# Patient Record
Sex: MALE | Race: WHITE
[De-identification: names, ages, dates, MRNs, and addresses within clinical notes are randomized per-mention and may not be internally consistent; named-entity substitution may affect disease eponyms.]

---

## 2017-11-08 ENCOUNTER — HOSPITAL ENCOUNTER (OUTPATIENT)
Dept: HOSPITAL 47 - RADUSWWP | Age: 57
Discharge: HOME | End: 2017-11-08
Payer: COMMERCIAL

## 2017-11-08 ENCOUNTER — HOSPITAL ENCOUNTER (OUTPATIENT)
Dept: HOSPITAL 47 - LABWHC1 | Age: 57
Discharge: HOME | End: 2017-11-08
Payer: COMMERCIAL

## 2017-11-08 DIAGNOSIS — E89.1: ICD-10-CM

## 2017-11-08 DIAGNOSIS — E44.0: ICD-10-CM

## 2017-11-08 DIAGNOSIS — N19: ICD-10-CM

## 2017-11-08 DIAGNOSIS — K74.1: ICD-10-CM

## 2017-11-08 DIAGNOSIS — E55.9: ICD-10-CM

## 2017-11-08 DIAGNOSIS — E21.1: ICD-10-CM

## 2017-11-08 DIAGNOSIS — K90.89: ICD-10-CM

## 2017-11-08 DIAGNOSIS — E66.01: Primary | ICD-10-CM

## 2017-11-08 DIAGNOSIS — R16.1: ICD-10-CM

## 2017-11-08 DIAGNOSIS — K50.90: ICD-10-CM

## 2017-11-08 DIAGNOSIS — Z98.890: ICD-10-CM

## 2017-11-08 DIAGNOSIS — D50.8: ICD-10-CM

## 2017-11-08 DIAGNOSIS — R10.31: ICD-10-CM

## 2017-11-08 DIAGNOSIS — R93.5: Primary | ICD-10-CM

## 2017-11-08 LAB
ALP SERPL-CCNC: 66 U/L (ref 38–126)
ALT SERPL-CCNC: 26 U/L (ref 21–72)
ANION GAP SERPL CALC-SCNC: 13 MMOL/L
APTT BLD: 25.1 SEC (ref 22–30)
AST SERPL-CCNC: 12 U/L (ref 17–59)
BUN SERPL-SCNC: 17 MG/DL (ref 9–20)
CALCIUM SPEC-MCNC: 9.5 MG/DL (ref 8.4–10.2)
CH: 20.3
CHCM: 26.9
CHLORIDE SERPL-SCNC: 100 MMOL/L (ref 98–107)
CHOLEST SERPL-MCNC: 149 MG/DL (ref ?–200)
CO2 SERPL-SCNC: 26 MMOL/L (ref 22–30)
ERYTHROCYTE [DISTWIDTH] IN BLOOD BY AUTOMATED COUNT: 3.99 M/UL (ref 4.3–5.9)
ERYTHROCYTE [DISTWIDTH] IN BLOOD: 16 % (ref 11.5–15.5)
GLUCOSE SERPL-MCNC: 114 MG/DL (ref 74–99)
HCT VFR BLD AUTO: 30.2 % (ref 39–53)
HDLC SERPL-MCNC: 23 MG/DL (ref 40–60)
HDW: 3.33
HGB BLD-MCNC: 7.9 GM/DL (ref 13–17.5)
INR PPP: 1.1 (ref ?–1.2)
IRON SERPL-MCNC: 15 UG/DL (ref 65–175)
MAGNESIUM SPEC-SCNC: 2 MG/DL (ref 1.6–2.3)
MCH RBC QN AUTO: 19.8 PG (ref 25–35)
MCHC RBC AUTO-ENTMCNC: 26.2 G/DL (ref 31–37)
MCV RBC AUTO: 75.8 FL (ref 80–100)
NON-AFRICAN AMERICAN GFR(MDRD): >60
POTASSIUM SERPL-SCNC: 5 MMOL/L (ref 3.5–5.1)
PREALB SERPL-MCNC: 8 MG/DL (ref 18–42)
PROT SERPL-MCNC: 6.9 G/DL (ref 6.3–8.2)
PT BLD: 10.6 SEC (ref 9–12)
SODIUM SERPL-SCNC: 139 MMOL/L (ref 137–145)
TIBC SERPL-MCNC: 350 UG/DL (ref 228–460)
VIT B12 SERPL-MCNC: 347 PG/ML (ref 200–944)
WBC # BLD AUTO: 6.6 K/UL (ref 3.8–10.6)

## 2017-11-08 PROCEDURE — 82306 VITAMIN D 25 HYDROXY: CPT

## 2017-11-08 PROCEDURE — 82607 VITAMIN B-12: CPT

## 2017-11-08 PROCEDURE — 83735 ASSAY OF MAGNESIUM: CPT

## 2017-11-08 PROCEDURE — 83036 HEMOGLOBIN GLYCOSYLATED A1C: CPT

## 2017-11-08 PROCEDURE — 82525 ASSAY OF COPPER: CPT

## 2017-11-08 PROCEDURE — 84255 ASSAY OF SELENIUM: CPT

## 2017-11-08 PROCEDURE — 80061 LIPID PANEL: CPT

## 2017-11-08 PROCEDURE — 83540 ASSAY OF IRON: CPT

## 2017-11-08 PROCEDURE — 84425 ASSAY OF VITAMIN B-1: CPT

## 2017-11-08 PROCEDURE — 83970 ASSAY OF PARATHORMONE: CPT

## 2017-11-08 PROCEDURE — 84134 ASSAY OF PREALBUMIN: CPT

## 2017-11-08 PROCEDURE — 84630 ASSAY OF ZINC: CPT

## 2017-11-08 PROCEDURE — 82746 ASSAY OF FOLIC ACID SERUM: CPT

## 2017-11-08 PROCEDURE — 74000: CPT

## 2017-11-08 PROCEDURE — 83550 IRON BINDING TEST: CPT

## 2017-11-08 PROCEDURE — 84443 ASSAY THYROID STIM HORMONE: CPT

## 2017-11-08 PROCEDURE — 80053 COMPREHEN METABOLIC PANEL: CPT

## 2017-11-08 PROCEDURE — 85027 COMPLETE CBC AUTOMATED: CPT

## 2017-11-08 PROCEDURE — 76700 US EXAM ABDOM COMPLETE: CPT

## 2017-11-08 PROCEDURE — 85610 PROTHROMBIN TIME: CPT

## 2017-11-08 PROCEDURE — 85730 THROMBOPLASTIN TIME PARTIAL: CPT

## 2017-11-08 PROCEDURE — 84590 ASSAY OF VITAMIN A: CPT

## 2017-11-08 PROCEDURE — 84100 ASSAY OF PHOSPHORUS: CPT

## 2017-11-08 PROCEDURE — 36415 COLL VENOUS BLD VENIPUNCTURE: CPT

## 2017-11-08 PROCEDURE — 82728 ASSAY OF FERRITIN: CPT

## 2017-11-08 NOTE — XR
EXAMINATION TYPE: XR abdomen 1V

 

DATE OF EXAM: 11/8/2017 11:35 AM

 

CLINICAL HISTORY:  Recent gastric ulcer surgery. Partial gastrectomy.

 

TECHNIQUE: Single upright image of the abdomen is obtained.

 

COMPARISON: Abdominal ultrasound of the same date.

 

FINDINGS: Preferential paucity of bowel gas is seen within the left midabdomen and few air-fluid leve
ls may be within bowel or potential residual intra-abdominal abscess is seen on the abdominal ultraso
und of the same day. Scattered gas is seen in non-distended small bowel loops. Gas and fecal material
 is seen in non-distended colon. The lung bases are clear and the osseous structures are intact. No e
vidence of pneumoperitoneum. Mild degenerative changes of the femoral acetabular joints and mild levo
scoliotic curvature of the lumbar spine are seen.

 

IMPRESSION:

1. Preferential paucity of bowel gas in the left midabdomen with few air-fluid levels that may be loc
ated in small bowel or residual abscess. Surrounding postsurgical sutures are seen within the left up
per and left lower quadrants.

2. Nonobstructive bowel gas pattern.

3. No evidence of pneumoperitoneum.

## 2017-11-08 NOTE — P.EN
Received a phone call regarding US reports on 11/8/17 at 5:30 pm. I 

communicated the results with Dr. Phillips. She is aware of the Xray and US 

reports.

## 2017-11-08 NOTE — US
EXAMINATION TYPE: US abdomen complete

 

DATE OF EXAM: 11/8/2017

 

COMPARISON: CT abdomen and pelvis dated 8/30/2017

 

CLINICAL HISTORY: RLQ Pain R10.31. Abdominal pain, stomach surgery for ulcer 6 weeks ago

 

EXAM MEASUREMENTS:

 

Liver Length:  15.1 cm   

Gallbladder Wall:  0.3 cm   

CBD:  0.3 cm

Spleen:  13.8 cm   

Right Kidney:  11.1 x 5.3 x 5.0 cm 

Left Kidney:  10.8 x 4.7 x 4.4 cm   

 

**Technical limitations due to large amount of overlying bowel content 

 

Pancreas:  Obscured by bowel gas

Liver:  best visualized intercostally, appears wnl  

Gallbladder:  no evidence of stones

**Evidence for sonographic Salter's sign:  no

CBD:  visualized portion appears wnl 

Spleen:  upper limits of normal   

Right Kidney:  no evidence of hydronephrosis or mass   

Left Kidney:  no evidence of hydronephrosis or mass   

Upper IVC:  wnl  

Abd Aorta:  portions obscured by overlying bowel content 

 

Scanned within LUQ/LLQ (patient's area of pain), complex area visualized =  12.9cm

 

 

IMPRESSION: 

1. Complex 12.9 cm multiloculated fluid collection in the patient's stated area of pain in the left l
ower quadrant containing foci of air. Sonographic findings favor residual abscess and correlation wit
h CT could be performed for further anatomic delineation.

2. Prominent size of the spleen measuring 13.8 cm, approaching criteria for splenomegaly.

 

 

A Orange message has been communicated to Laurie Vega MD via the [employer] | Critical Re
sult system on 11/8/2017 12:10 PM, Message ID [id number].

## 2017-11-17 ENCOUNTER — HOSPITAL ENCOUNTER (OUTPATIENT)
Dept: HOSPITAL 47 - RADCTMAIN | Age: 57
End: 2017-11-17
Payer: COMMERCIAL

## 2017-11-17 DIAGNOSIS — R59.0: ICD-10-CM

## 2017-11-17 DIAGNOSIS — L02.211: Primary | ICD-10-CM

## 2017-11-17 PROCEDURE — 74177 CT ABD & PELVIS W/CONTRAST: CPT

## 2017-11-17 NOTE — CT
EXAMINATION TYPE: CT abdomen pelvis w con

 

DATE OF EXAM: 11/17/2017

 

COMPARISON: Abdominal ultrasound dated 11/8/2017 and CT abdomen pelvis dated 8/30/2017.

 

HISTORY: RUQ abdominal pain

 

CT DLP: 1593 mGycm

Automated exposure control for dose reduction was used.

 

TECHNIQUE:  Helical acquisition of images was performed from the lung bases through the pelvis.

 

CONTRAST: 

Performed with Oral Contrast and with IV Contrast, patient injected with 100 ml mL of Omnipaque 300.

 

FINDINGS: 

 

LUNG BASES: Right basilar pleural parenchymal scarring and left basilar subsegmental linear atelectas
is are present within the lung bases.

 

LIVER/GB: No significant abnormality is appreciated. No evidence of cholelithiasis.

 

PANCREAS: No significant abnormality is seen. No ductal dilatation.

 

SPLEEN: No splenomegaly.

 

ADRENALS: Adrenal glands are symmetric without focal nodule.

 

KIDNEYS: Kidneys enhance symmetrically. No hydronephrosis or perinephric fat stranding.

 

FREE AIR:  No free air is visualized.

 

ADENOPATHY:  Extensive adenopathy is seen within the mesentery with encasement of the mesenteric vasc
ulature and conglomeration of lymph nodes surrounding the SMA and branches of the SMA/SMV measuring 3
.9 x 7.8 x 4.0 cm and 7.3 x 3.7 x 3.6 cm on series 3 image 41 and series 5 image 20. This is progress
ed from the prior exam

 

REPRODUCTIVE ORGANS: No significant abnormality is seen

 

URINARY BLADDER:  No significant abnormality is seen.

 

OSSEOUS STRUCTURES:  No suspicious abnormality.

 

BOWEL: The previously seen intra-abdominal abscess caudal to the postoperative change from partial ga
strectomy is multiloculated and although abuts the greater curvature of the stomach no contrast is se
en internally and therefore communication is unlikely. This has a progressive thick walled periphery 
in comparison to the prior measuring up to 2.4 cm in thickness on series 3 image 34. Intra-abdominal 
abscess now measures up to 7.5 x 10.8 cm with central fluid component measuring up to 3.6 x 7.2 cm as
 compared to 3.4 x 7.1 cm on the prior exam. Second more superior loculated component inferior to the
 suture material measures approximately 5.5 x 4.1 x 2.6 cm on series 3 image 29 and series 5 image 28
. These displace adjacent loops of large and small bowel. Smaller centrally fluid attenuated region i
n the periaortic space on series 4 image 44 measures 2.7 x 2.3 x 2.1 cm and is new from the prior exa
m either representing necrotic adenopathy or an additional fluid collection. Foci of air seen superio
rly within the larger intra-abdominal abscess.

 

IMPRESSION: 

PROGRESSED LEFT MID ABDOMINAL MULTILOCULATED THICK-WALLED ABSCESS WITH SURROUNDING PHLEGMONOUS CHANGE
 AND EXTENSIVE LOCAL ADENOPATHY ENCASING THE SUPERIOR MESENTERIC ARTERY AND VEIN AND ITS BRANCHES. TH
IS MEASURES UP TO 7.5 X 10.8 CM AND DISPLACES ADJACENT BOWEL. NEW ADDITIONAL PERIAORTIC CENTRALLY FLU
ID ATTENUATED 2.7 X 2.3 X 2.1 CM STRUCTURE COULD REPRESENT CENTRALLY NECROTIC ADENOPATHY OR ADJACENT 
ADDITIONAL NEW FLUID COLLECTION. ALTHOUGH THE LARGER ABSCESS ABUTS THE GREATER CURVATURE OF THE STOMA
CH AND SURGICAL SUTURES NO CONTRAST IS SEEN WITHIN THE ABSCESS CAVITY TO SUGGEST DIRECT EXTENSION.

 

 

 

 

A Orange message has been communicated to Laurie Vega MD via the [employer] | Critical Re
sult system on 11/17/2017 11:31 AM, Message ID [id number].

## 2017-11-17 NOTE — P.PN
Progress Note - Text


Progress Note Date: 11/17/17





Ct findings discussed with patient. He denies any fevers or chills, diarrhea, 

nausea or vomiting. Patient to be scheduled for IR drainage and EGD as 

outpatient. All of his questions were answered.

## 2017-11-21 ENCOUNTER — HOSPITAL ENCOUNTER (OUTPATIENT)
Dept: HOSPITAL 47 - RADPROMAIN | Age: 57
Discharge: HOME | End: 2017-11-21
Payer: COMMERCIAL

## 2017-11-21 VITALS — RESPIRATION RATE: 16 BRPM | TEMPERATURE: 97.2 F

## 2017-11-21 VITALS — HEART RATE: 86 BPM | SYSTOLIC BLOOD PRESSURE: 106 MMHG | DIASTOLIC BLOOD PRESSURE: 64 MMHG

## 2017-11-21 DIAGNOSIS — Z53.8: ICD-10-CM

## 2017-11-21 DIAGNOSIS — K65.1: Primary | ICD-10-CM

## 2017-11-21 LAB
INR PPP: 1.1 (ref ?–1.2)
NON-AFRICAN AMERICAN GFR(MDRD): >60
PT BLD: 10.8 SEC (ref 9–12)

## 2017-11-21 PROCEDURE — 76380 CAT SCAN FOLLOW-UP STUDY: CPT

## 2017-11-21 PROCEDURE — 85049 AUTOMATED PLATELET COUNT: CPT

## 2017-11-21 PROCEDURE — 85610 PROTHROMBIN TIME: CPT

## 2017-11-21 PROCEDURE — 36415 COLL VENOUS BLD VENIPUNCTURE: CPT

## 2017-11-21 PROCEDURE — 82565 ASSAY OF CREATININE: CPT

## 2017-11-21 NOTE — CT
EXAMINATION TYPE: CT discontinued procedure

 

DATE OF EXAM: 11/21/2017

 

COMPARISON: CT 11/17/2017

 

HISTORY: Discontinued procedure of abscess drainage

 

CT DLP: 409 mGycm

Automated exposure control for dose reduction was used.

 

FINDINGS: 

Correlation with CT 11/17/2017

 

Patient does not show an elevated white blood cell count denies fever chills. Fluid collection in the
 left upper quadrant shows a limited window for percutaneous access. Following discussion with Dr. Matta no aspiration performed at this time.

 

IMPRESSION: 

ABORTED ASPIRATION.

## 2017-11-22 ENCOUNTER — HOSPITAL ENCOUNTER (OUTPATIENT)
Dept: HOSPITAL 47 - ORWHC2ENDO | Age: 57
Discharge: HOME | End: 2017-11-22
Payer: COMMERCIAL

## 2017-11-22 VITALS — RESPIRATION RATE: 16 BRPM | TEMPERATURE: 97.6 F

## 2017-11-22 VITALS — BODY MASS INDEX: 23.5 KG/M2

## 2017-11-22 VITALS — DIASTOLIC BLOOD PRESSURE: 71 MMHG | SYSTOLIC BLOOD PRESSURE: 112 MMHG | HEART RATE: 77 BPM

## 2017-11-22 DIAGNOSIS — Z90.3: ICD-10-CM

## 2017-11-22 DIAGNOSIS — K21.0: Primary | ICD-10-CM

## 2017-11-22 DIAGNOSIS — K44.9: ICD-10-CM

## 2017-11-22 DIAGNOSIS — R59.0: ICD-10-CM

## 2017-11-22 DIAGNOSIS — Z87.891: ICD-10-CM

## 2017-11-22 DIAGNOSIS — D50.9: ICD-10-CM

## 2017-11-22 DIAGNOSIS — Z88.8: ICD-10-CM

## 2017-11-22 DIAGNOSIS — Z79.899: ICD-10-CM

## 2017-11-22 DIAGNOSIS — K31.6: ICD-10-CM

## 2017-11-22 DIAGNOSIS — K63.89: ICD-10-CM

## 2017-11-22 DIAGNOSIS — Z85.72: ICD-10-CM

## 2017-11-22 DIAGNOSIS — R14.2: ICD-10-CM

## 2017-11-22 PROCEDURE — 43239 EGD BIOPSY SINGLE/MULTIPLE: CPT

## 2017-11-22 PROCEDURE — 88305 TISSUE EXAM BY PATHOLOGIST: CPT

## 2017-11-22 NOTE — P.PN
Progress Note - Text


Progress Note Date: 11/22/17





After further discussion with the patient.  He has moderate intra-abdominal 

lymphadenopathy.  We'll proceed with CT-guided biopsy of intra-abdominal 

lymphadenopathy.  Patient is agreeable. He wants to avoid any surgery of the 

right groin until he undergoes a right inguinal hernia repair in the future.

## 2017-11-22 NOTE — P.PCN
Date of Procedure: 11/22/17


Description of Procedure: 








PREOPERATIVE DIAGNOSIS:


Iron deficiency anemia.


Gastroenteric fistula.


Epigastric abdominal pain.


Increased eructation.





POSTOPERATIVE DIAGNOSIS:


Iron deficiency anemia.


Gastroenteric fistula.


Epigastric abdominal pain.


Gastroesophageal reflux disease.


Increased eructation.


Diaphragmatic hiatal hernia without obstruction.





OPERATION:


Esophagogastroduodenoscopy with biopsies along the distal esophagus





SURGEON: Laurie Vega MD





ANESTHESIA: MAC.





INDICATIONS:


The patient is a 57-year-old male who presents with a history of reflux disease 

and gastroenteric fistula status post repair. Benefits and risks of the 

procedure were described. Informed consent was obtained.





DESCRIPTION:


The patient was brought into the endoscopy suite and laid in the left lateral 

decubitus position. An Olympus gastroscope was passed along the posterior 

oropharynx down to the distal esophagus where the squamocolumnar junction was 

encountered at 38 cm from the incisors. The stomach was entered and moderate 

retained was found and suctioned from the stomach.  Along the lesser curvature 

of the stomach, an orifice proximal to the angularis incisura of less than 9.2 

mm in size was found and a moderately contracted from his previous upper 

endoscopy less than 3 months ago.  The mucosa of the rest of the stomach was 

within normal limits.  No gastric polyps were identified.  No arteriovenous 

malformations was identified.  Biopsies with cold forceps were obtained of the 

distal esophagus features consistent with erosive esophagitis, LA grade C, from 

33 cm to 38 cm from the incisors.  Secondary to moderate retained food, the 

gastrojejunostomy anastomosis was obsecured and unable to cannulate with the 

scope.  Retroflexion of the scope confirmed  Hill grade 4 lower esophageal 

valve.  The stomach was desufflated. The patient tolerated the procedure well.





FINDINGS:


Squamocolumnar junction 38 cm from the incisors.


Diaphragmatic hiatus at 41 cm.


Hiatal hernia 3 cm, fixed, type III.


Hill grade 4 lower esophageal valve.


LA grade C erosive esophagitis, 33 cm from the incisors to 38 cm from the 

incisors.


Resolving orifice of gastroenteric fistula proximal to the angularis incisura 

at the lesser curvature of the stomach, from initially 30 mm down to less than 

9.2 mm in size.


Moderate retained food in the stomach prohibiting evaluation of gastrojejunal 

anastomosis.





RECOMMENDATIONS:


Continue omeprazole.


Recommend upper endoscopy with balloon dilation after 24 hours of liquid diet.





Plan - Discharge Summary


New Discharge Prescriptions: 


New


   Ergocalciferol [Vitamin D2 (DRISDOL)] 50,000 unit PO Q7D #12 cap





No Action


   Omeprazole 40 mg PO DAILY #90 capsule.


Discharge Medication List





Omeprazole 40 mg PO DAILY #90 capsule. 09/03/17 [Rx]


Ergocalciferol [Vitamin D2 (DRISDOL)] 50,000 unit PO Q7D #12 cap 11/22/17 [Rx]








Follow up Appointment(s)/Referral(s): 


Laurie Vega MD [STAFF PHYSICIAN] - 12/05/17 11:20 am


Patient Instructions/Handouts:  *[facility] - (Anesthesia) Endoscopy Discharge 

Instructions, Gastroesophageal Reflux Disease (GEN), Upper Endoscopy (GEN)


Discharge Disposition: HOME SELF-CARE

## 2017-11-22 NOTE — P.GSHP
History of Present Illness


H&P Date: 17











CHIEF COMPLAINT: History of anemia, lymphoma, gastroenteric fistula





HISTORY OF PRESENT ILLNESS: The patient is a 57-year-old male who


presents with gastroesophageal reflux disease including gastroenteric fistula 

and anemia.  


He had a recent diagnostic studies highly suspicious for lymphoma.  


Upper endoscopy including right lymph node biopsy was requested by his 

oncologist.





PAST MEDICAL HISTORY: 


Please see list.





PAST SURGICAL HISTORY: 


Please see list.





MEDICATIONS: 


Please see list.





ALLERGIES:  Please see list. 





SOCIAL HISTORY: No illicit drug use





FAMILY HISTORY: No reports of Crohn disease or ulcerative colitis. 





REVIEW OF ORGAN SYSTEMS: 


CONSTITUTIONAL: No reports of fevers or chills. 





PHYSICAL EXAM: 


VITAL SIGNS:  Stable


GENERAL: Well-developed pleasant in no acute distress. 


HEENT: No scleral icterus. Extraocular movements grossly


intact. Moist buccal mucosa. 


NECK: Supple without lymphadenopathy. 


CHEST: Unlabored respirations. Equal bilateral excursions. 


CARDIOVASCULAR: Regular rate and rhythm. Distal 2+ pulses. 


ABDOMEN: Soft, nondistended.  


MUSCULOSKELETAL: No clubbing, cyanosis, or edema. 





ASSESSMENT: 


1.  Anemia.


2.  Gastroenteric fistula.


3.  Lymphadenopathy, lymphoma.





PLAN: 


1. Recommend proceeding with an upper endoscopy with right inguinal lymph node 

biopsy.





Past Medical History


Past Medical History: Pneumonia


Additional Past Medical History / Comment(s): anemia dt ulcer


History of Any Multi-Drug Resistant Organisms: None Reported


Past Surgical History: Appendectomy, Orthopedic Surgery


Additional Past Surgical History / Comment(s): torn acl and several scopes of 

bilat knees, partial gastrectomy due to perforated ulcer.


Past Anesthesia/Blood Transfusion Reactions: No Reported Reaction


Past Psychological History: No Psychological Hx Reported


Additional Psychological History / Comment(s): Pt resides with his spouse and 

his 15 yr old step son.  He is independent.  He works for the [employer].  He 

works out regularly.


Smoking Status: Former smoker


Past Alcohol Use History: None Reported


Additional Past Alcohol Use History / Comment(s): Pt states he was a light 

social smoker but has not smoked any cigarettes in 6 months.  Pt states he has 

not had an alcoholic beverage in months, prior he drank more but never daily or 

abused.


Past Drug Use History: None Reported





- Past Family History


  ** Father


Family Medical History: Pneumonia


Additional Family Medical History / Comment(s): Father had anemia.  He  at 

the age of 79yrs from complications after a ingrown toenail was removed and 

then became infected and had to have amputation.





  ** Mother


Family Medical History: Dementia


Additional Family Medical History / Comment(s): Mother  of dementia at the 

age of 82 yrs.





Medications and Allergies


 Home Medications











 Medication  Instructions  Recorded  Confirmed  Type


 


Omeprazole 40 mg PO DAILY #90 capsule. 17 Rx











 Allergies











Allergy/AdvReac Type Severity Reaction Status Date / Time


 


medication for TB exposure Allergy  Unknown Uncoded 17 10:27

## 2017-12-20 ENCOUNTER — HOSPITAL ENCOUNTER (OUTPATIENT)
Dept: HOSPITAL 47 - CATHCVL | Age: 57
Discharge: HOME | End: 2017-12-20
Attending: RADIOLOGY
Payer: COMMERCIAL

## 2017-12-20 VITALS — HEART RATE: 100 BPM | TEMPERATURE: 97.8 F

## 2017-12-20 VITALS — SYSTOLIC BLOOD PRESSURE: 134 MMHG | DIASTOLIC BLOOD PRESSURE: 56 MMHG | RESPIRATION RATE: 18 BRPM

## 2017-12-20 DIAGNOSIS — D47.3: ICD-10-CM

## 2017-12-20 DIAGNOSIS — Z79.899: ICD-10-CM

## 2017-12-20 DIAGNOSIS — Z87.891: ICD-10-CM

## 2017-12-20 DIAGNOSIS — Z98.84: ICD-10-CM

## 2017-12-20 DIAGNOSIS — K20.9: ICD-10-CM

## 2017-12-20 DIAGNOSIS — R19.02: ICD-10-CM

## 2017-12-20 DIAGNOSIS — D50.9: ICD-10-CM

## 2017-12-20 DIAGNOSIS — Z88.8: ICD-10-CM

## 2017-12-20 DIAGNOSIS — C81.90: Primary | ICD-10-CM

## 2017-12-20 PROCEDURE — 36569 INSJ PICC 5 YR+ W/O IMAGING: CPT

## 2017-12-20 PROCEDURE — 76937 US GUIDE VASCULAR ACCESS: CPT

## 2017-12-20 PROCEDURE — 77001 FLUOROGUIDE FOR VEIN DEVICE: CPT

## 2017-12-20 RX ADMIN — LIDOCAINE HYDROCHLORIDE ONE ML: 20 INJECTION, SOLUTION INFILTRATION; PERINEURAL at 13:18

## 2017-12-20 RX ADMIN — LIDOCAINE HYDROCHLORIDE ONE ML: 20 INJECTION, SOLUTION INFILTRATION; PERINEURAL at 13:27

## 2018-01-11 NOTE — IR
PICC LINE PLACEMENT:

 

HISTORY:  Infection requiring long-term antibiotic therapy

 

PROCEDURE:  Ultrasound and fluoroscopic guidance of PICC line placement.

 

COMPLICATIONS:  None

 

ANESTHESIA:  1. 1% Lidocaine locally.

 

FINDINGS/TECHNIQUE:  The procedure was explained to the patient.  The risks, complications, benefits 
and alternatives were discussed and any questions were answered.  Informed consent was obtained.  The
 patient was placed supine on the fluoroscopic table and prepped and draped in the usual sterile fash
ion.  Utilizing a  21 gauge needle and sonographic and fluoroscopic guidance, access in the  vein was
 achieved and there is placement of a 0.018 guidewire.  The vein is patent.  A 5-Fr sheath was placed
 over the guidewire.  The guidewire and dilator were removed and a 5-F. Double lumen PICC line was pl
aced through the sheath with the tip at the level of the SVC.  The sheath was removed, the catheter w
as flushed and sutured into position.  The patient was stable throughout the procedure and remained s
table upon discharge from the Department of Radiology. 

 

The vein puncture was patent under ultrasound. A gray scale image was obtained to document patency of
 the vein punctured.

 

All elements of the maximal barrier technique were utilized.

 

FLUOROSCOPY TIME: 0.2 minute, one image supplied

 

IMPRESSION: 

 

Successful PICC double lumen line placement under ultrasound and fluoroscopic guidance.

## 2018-01-15 ENCOUNTER — HOSPITAL ENCOUNTER (EMERGENCY)
Dept: HOSPITAL 47 - EC | Age: 58
Discharge: OTHER INSTITUTION | End: 2018-01-15
Payer: COMMERCIAL

## 2018-01-15 VITALS — HEART RATE: 98 BPM | SYSTOLIC BLOOD PRESSURE: 103 MMHG | TEMPERATURE: 99.3 F | DIASTOLIC BLOOD PRESSURE: 61 MMHG

## 2018-01-15 VITALS — RESPIRATION RATE: 16 BRPM

## 2018-01-15 DIAGNOSIS — I95.9: ICD-10-CM

## 2018-01-15 DIAGNOSIS — K52.9: Primary | ICD-10-CM

## 2018-01-15 DIAGNOSIS — Z90.3: ICD-10-CM

## 2018-01-15 DIAGNOSIS — C85.90: ICD-10-CM

## 2018-01-15 DIAGNOSIS — Z79.52: ICD-10-CM

## 2018-01-15 DIAGNOSIS — Z79.899: ICD-10-CM

## 2018-01-15 DIAGNOSIS — R18.8: ICD-10-CM

## 2018-01-15 DIAGNOSIS — D64.9: ICD-10-CM

## 2018-01-15 DIAGNOSIS — Z88.8: ICD-10-CM

## 2018-01-15 DIAGNOSIS — Z87.891: ICD-10-CM

## 2018-01-15 DIAGNOSIS — K26.5: ICD-10-CM

## 2018-01-15 DIAGNOSIS — D72.829: ICD-10-CM

## 2018-01-15 LAB
ALBUMIN SERPL-MCNC: 3 G/DL (ref 3.5–5)
ALP SERPL-CCNC: 151 U/L (ref 38–126)
ALT SERPL-CCNC: 61 U/L (ref 21–72)
AMYLASE SERPL-CCNC: 33 U/L (ref 30–110)
ANION GAP SERPL CALC-SCNC: 7 MMOL/L
APTT BLD: 28.5 SEC (ref 22–30)
AST SERPL-CCNC: 21 U/L (ref 17–59)
BUN SERPL-SCNC: 20 MG/DL (ref 9–20)
CALCIUM SPEC-MCNC: 8.5 MG/DL (ref 8.4–10.2)
CELLS COUNTED: 100
CHLORIDE SERPL-SCNC: 108 MMOL/L (ref 98–107)
CK SERPL-CCNC: <20 U/L (ref 55–170)
CO2 SERPL-SCNC: 23 MMOL/L (ref 22–30)
ERYTHROCYTE [DISTWIDTH] IN BLOOD BY AUTOMATED COUNT: 3.87 M/UL (ref 4.3–5.9)
ERYTHROCYTE [DISTWIDTH] IN BLOOD: 22.2 % (ref 11.5–15.5)
GLUCOSE SERPL-MCNC: 96 MG/DL (ref 74–99)
HCT VFR BLD AUTO: 30.3 % (ref 39–53)
HGB BLD-MCNC: 9.4 GM/DL (ref 13–17.5)
HYALINE CASTS UR QL AUTO: 2 /LPF (ref 0–2)
INR PPP: 1.2 (ref ?–1.2)
LIPASE SERPL-CCNC: 46 U/L (ref 23–300)
LYMPHOCYTES # BLD MANUAL: 0.26 K/UL (ref 1–4.8)
MCH RBC QN AUTO: 24.2 PG (ref 25–35)
MCHC RBC AUTO-ENTMCNC: 31 G/DL (ref 31–37)
MCV RBC AUTO: 78.2 FL (ref 80–100)
MONOCYTES # BLD MANUAL: 0.52 K/UL (ref 0–1)
NEUTROPHILS NFR BLD MANUAL: 94 %
NEUTS SEG # BLD MANUAL: 12.13 K/UL (ref 1.3–7.7)
PH UR: 6 [PH] (ref 5–8)
PLATELET # BLD AUTO: 146 K/UL (ref 150–450)
POTASSIUM SERPL-SCNC: 4.2 MMOL/L (ref 3.5–5.1)
PROT SERPL-MCNC: 5.2 G/DL (ref 6.3–8.2)
PROT UR QL: (no result)
PT BLD: 11.3 SEC (ref 9–12)
RBC UR QL: 3 /HPF (ref 0–5)
SODIUM SERPL-SCNC: 138 MMOL/L (ref 137–145)
SP GR UR: 1.02 (ref 1–1.03)
SQUAMOUS UR QL AUTO: 1 /HPF (ref 0–4)
TROPONIN I SERPL-MCNC: <0.012 NG/ML (ref 0–0.03)
UROBILINOGEN UR QL STRIP: <2 MG/DL (ref ?–2)
WBC # BLD AUTO: 12.9 K/UL (ref 3.8–10.6)
WBC #/AREA URNS HPF: 10 /HPF (ref 0–5)

## 2018-01-15 PROCEDURE — 85025 COMPLETE CBC W/AUTO DIFF WBC: CPT

## 2018-01-15 PROCEDURE — 36415 COLL VENOUS BLD VENIPUNCTURE: CPT

## 2018-01-15 PROCEDURE — 87040 BLOOD CULTURE FOR BACTERIA: CPT

## 2018-01-15 PROCEDURE — 82150 ASSAY OF AMYLASE: CPT

## 2018-01-15 PROCEDURE — 82553 CREATINE MB FRACTION: CPT

## 2018-01-15 PROCEDURE — 81001 URINALYSIS AUTO W/SCOPE: CPT

## 2018-01-15 PROCEDURE — 96374 THER/PROPH/DIAG INJ IV PUSH: CPT

## 2018-01-15 PROCEDURE — 85610 PROTHROMBIN TIME: CPT

## 2018-01-15 PROCEDURE — 96365 THER/PROPH/DIAG IV INF INIT: CPT

## 2018-01-15 PROCEDURE — 96375 TX/PRO/DX INJ NEW DRUG ADDON: CPT

## 2018-01-15 PROCEDURE — 82550 ASSAY OF CK (CPK): CPT

## 2018-01-15 PROCEDURE — 96361 HYDRATE IV INFUSION ADD-ON: CPT

## 2018-01-15 PROCEDURE — 87086 URINE CULTURE/COLONY COUNT: CPT

## 2018-01-15 PROCEDURE — 85730 THROMBOPLASTIN TIME PARTIAL: CPT

## 2018-01-15 PROCEDURE — 80053 COMPREHEN METABOLIC PANEL: CPT

## 2018-01-15 PROCEDURE — 84484 ASSAY OF TROPONIN QUANT: CPT

## 2018-01-15 PROCEDURE — 93005 ELECTROCARDIOGRAM TRACING: CPT

## 2018-01-15 PROCEDURE — 74177 CT ABD & PELVIS W/CONTRAST: CPT

## 2018-01-15 PROCEDURE — 99285 EMERGENCY DEPT VISIT HI MDM: CPT

## 2018-01-15 PROCEDURE — 83605 ASSAY OF LACTIC ACID: CPT

## 2018-01-15 PROCEDURE — 83690 ASSAY OF LIPASE: CPT

## 2018-01-15 PROCEDURE — 96376 TX/PRO/DX INJ SAME DRUG ADON: CPT

## 2018-01-15 NOTE — ED
Abdominal Pain HPI





- General


Chief Complaint: Abdominal Pain


Stated Complaint: abdominal pain/drainage tube/cancer pt


Time Seen by Provider: 01/15/18 14:52


Source: patient


Mode of arrival: ambulatory


Limitations: no limitations





- History of Present Illness


Initial Comments: 





This 57-year-old white male presents with a complaint of some abdominal pain.  

He states that it started on his left lower abdomen and moved his right 

abdomen.  He states that it is fairly severe in nature.  It started yesterday.  

It is associated with some nausea and he had occasional vomiting this morning.  

He denies any diarrhea or constipation.  He denies any fevers but has had 

significant chills.  He states that he feels very weak.  He will have 

occasional blurry vision and feels very lightheaded if he stands.  The pain is 

worse with any movement.  He has a moderately significant complex history 

regarding his gastrointestinal system recently.  He does relate a history of non

-Hodgkin's lymphoma which started approximately a month and half ago.  His last 

chemotherapy was a couple of weeks ago.  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 

fistula.  This was repaired at our facility with partial gastrectomy with Yisel-

en-Y.  He apparently continued to have problems.  The cancer apparently caused 

a biliary blockage.  He was seen down at [facility] and had a biliary 

drainage catheter placed several weeks ago.  This is been draining fine.  He 

states that the lymphoma was also causing some urinary blockage.  He relates 

his cancer is stage II.  He has been undergoing chemotherapy.  He states that 

the pain is fairly severe.  He denies any other complaints or modifying factors.





- Related Data


 Home Medications











 Medication  Instructions  Recorded  Confirmed


 


Metoclopramide [Reglan] 10 mg PO QID PRN 11/24/17 01/15/18


 


HYDROcodone/APAP 7.5-325MG [Norco 1 tab PO Q6HR PRN 12/20/17 01/15/18





7.5-325]   


 


Ergocalciferol [Vitamin D2 50,000 unit PO WE 01/15/18 01/15/18





(DRISDOL)]   


 


Sennosides-Docusate Sodium 1 tab PO HS 01/15/18 01/15/18





[Senokot-S]   


 


predniSONE 100 mg PO AS DIRECTED 01/15/18 01/15/18








 Previous Rx's











 Medication  Instructions  Recorded


 


Omeprazole 40 mg PO DAILY #90 capsule. 17











 Allergies











Allergy/AdvReac Type Severity Reaction Status Date / Time


 


medication for TB exposure Allergy  Unknown Uncoded 01/15/18 14:56














Review of Systems


ROS Statement: 


Those systems with pertinent positive or pertinent negative responses have been 

documented in the HPI.





ROS Other: All systems not noted in ROS Statement are negative.





Past Medical History


Past Medical History: Cancer, Pneumonia


Additional Past Medical History / Comment(s): anemia dt ulcer, hypotension


History of Any Multi-Drug Resistant Organisms: None Reported


Past Surgical History: Appendectomy, Orthopedic Surgery


Additional Past Surgical History / Comment(s): torn acl and several scopes of 

bilat knees, partial gastrectomy A COUPLE MONTHS AGO due to perforated ulcer.


Past Anesthesia/Blood Transfusion Reactions: No Reported Reaction


Past Psychological History: No Psychological Hx Reported


Smoking Status: Former smoker


Past Alcohol Use History: None Reported


Past Drug Use History: None Reported





- Past Family History


  ** Father


Family Medical History: Pneumonia


Additional Family Medical History / Comment(s): Father had anemia.  He  at 

the age of 79yrs from complications after a ingrown toenail was removed and 

then became infected and had to have amputation.





  ** Mother


Family Medical History: Dementia


Additional Family Medical History / Comment(s): Mother  of dementia at the 

age of 82 yrs.





General Exam





- General Exam Comments


Initial Comments: 





GENERAL: The patient is well nourished and well hydrated. 


VITAL SIGNS: Heart rate, blood pressure, respiratory rate reviewed as recorded 

in nurse's notes. 


EYES: Pupils are round and reactive. Extraocular movements are intact. No 

conjunctival / lid redness or swelling. 


ENT: No external evidence of injury, swelling, or ecchymosis. Airway is patent. 

Throat is clear. 


NECK: Nontender. No swelling or evidence of injury. No subcutaneous emphysema. 

Trachea is midline. No thyroid mass. 


HEART: Regular rate and rhythm. Good peripheral pulses. 


LUNGS/CHEST: Breath sounds clear and equal bilaterally. No rales, rhonchi, or 

wheezes. No ecchymosis, subcutaneous emphysema, or tenderness. 


ABDOMEN: There is tenderness present to the bilateral abdomen inferiorly.  The 

pain is somewhat worse on the left side.  There is no distention.  There is a 

biliary stent in place with no surrounding erythema and good yellowish 

drainage.  No palpable masses or organomegaly. No peritoneal signs. No 

abdominal wall swelling or ecchymosis. 


EXTREMITIES: No extremity tenderness. Normal muscle tone and function. No 

thoracolumbar tenderness. 


NEUROLOGIC: Sensation is grossly intact. Cranial nerve exam reveals face is 

symmetrical, tongue is midline, speech is clear. 


SKIN: No abrasions or ecchymosis is noted. No induration or masses noted. 


PSYCHIATRIC: Alert and oriented. Appropriate behavior and judgment.





Limitations: no limitations





Course


 Vital Signs











  01/15/18 01/15/18 01/15/18





  14:26 15:57 16:36


 


Temperature 98.0 F  


 


Pulse Rate 97 88 104 H


 


Respiratory 20 16 16





Rate   


 


Blood Pressure 89/51 101/57 103/56


 


O2 Sat by Pulse 96 100 98





Oximetry   














Medical Decision Making





- Medical Decision Making





The patient was seen and examined.  All diagnostics were reviewed.  He is 

hypotensive initially and receives ample fluid hydration.  Receive some 

Dilaudid and Zofran intravenously as well.  His EKG shows a normal sinus rhythm 

at a rate of 84.  There is no acute ST-T wave changes identified.  The MS 

intervals 112, QRS duration is 80, and the QTC interval is 441.  The laboratory 

is reviewed which is show a chronic anemia as well as a leukocytosis.  The 

computed tomography scan of abdomen and pelvis does show extensive inflammatory 

changes in the abdomen,: Calm and duodenum.  There is increase in pelvic free 

fluid noted.  There is also the possibility of a duodenal perforation per 

radiology.  The patient is started on some Zosyn.  He is feeling much improved 

on recheck.  His blood pressure is more stable.  The case is discussed with Dr. Pittman who is on-call for surgery and he requested the patient be transferred 

to [facility] for further treatment as this is a tertiary 

gastrointestinal center.  The case is discussed with Dr. Lynch and she is 

agreeable to transfer and would like the patient transferred through the 

emergency department.  The case is also discussed with the nurse transfer 

coordinator who is agreeable with this plan.  She relates that I do not have to 

talk to anybody else in regards to transfer.  Appropriate transfer paperwork is 

completed.  It is felt as though the patient is significantly ill and long-term 

prognosis is guarded.





- Lab Data


Result diagrams: 


 01/15/18 15:30





 01/15/18 15:30


 Lab Results











  01/15/18 01/15/18 01/15/18 Range/Units





  15:30 15:30 15:30 


 


WBC   12.9 H   (3.8-10.6)  k/uL


 


RBC   3.87 L   (4.30-5.90)  m/uL


 


Hgb   9.4 L D   (13.0-17.5)  gm/dL


 


Hct   30.3 L   (39.0-53.0)  %


 


MCV   78.2 L   (80.0-100.0)  fL


 


MCH   24.2 L   (25.0-35.0)  pg


 


MCHC   31.0   (31.0-37.0)  g/dL


 


RDW   22.2 H   (11.5-15.5)  %


 


Plt Count   146 L D   (150-450)  k/uL


 


Neutrophils % (Manual)   94   %


 


Lymphocytes % (Manual)   2   %


 


Monocytes % (Manual)   4   %


 


Neutrophils # (Manual)   12.13 H   (1.3-7.7)  k/uL


 


Lymphocytes # (Manual)   0.26 L   (1.0-4.8)  k/uL


 


Monocytes # (Manual)   0.52   (0-1.0)  k/uL


 


Nucleated RBCs   0   (0-0)  /100 WBC


 


Polychromasia   Present   


 


Hypochromasia   Marked   


 


Poikilocytosis   Slight   


 


Anisocytosis   Moderate   


 


Microcytosis   Moderate   


 


PT     (9.0-12.0)  sec


 


INR     (<1.2)  


 


APTT     (22.0-30.0)  sec


 


Sodium  138    (137-145)  mmol/L


 


Potassium  4.2    (3.5-5.1)  mmol/L


 


Chloride  108 H    ()  mmol/L


 


Carbon Dioxide  23    (22-30)  mmol/L


 


Anion Gap  7    mmol/L


 


BUN  20    (9-20)  mg/dL


 


Creatinine  0.59 L    (0.66-1.25)  mg/dL


 


Est GFR (MDRD) Af Amer  >60    (>60 ml/min/1.73 sqM)  


 


Est GFR (MDRD) Non-Af  >60    (>60 ml/min/1.73 sqM)  


 


Glucose  96    (74-99)  mg/dL


 


Plasma Lactic Acid Len     (0.7-2.0)  mmol/L


 


Calcium  8.5    (8.4-10.2)  mg/dL


 


Total Bilirubin  0.3    (0.2-1.3)  mg/dL


 


AST  21    (17-59)  U/L


 


ALT  61    (21-72)  U/L


 


Alkaline Phosphatase  151 H    ()  U/L


 


Total Creatine Kinase    <20 L  ()  U/L


 


CK-MB (CK-2)    <0.2  (0.0-2.4)  ng/mL


 


CK-MB (CK-2) Rel Index      


 


Troponin I    <0.012  (0.000-0.034)  ng/mL


 


Total Protein  5.2 L    (6.3-8.2)  g/dL


 


Albumin  3.0 L    (3.5-5.0)  g/dL


 


Amylase  33    ()  U/L


 


Lipase  46    ()  U/L


 


Urine Color     


 


Urine Appearance     (Clear)  


 


Urine pH     (5.0-8.0)  


 


Ur Specific Gravity     (1.001-1.035)  


 


Urine Protein     (Negative)  


 


Urine Glucose (UA)     (Negative)  


 


Urine Ketones     (Negative)  


 


Urine Blood     (Negative)  


 


Urine Nitrite     (Negative)  


 


Urine Bilirubin     (Negative)  


 


Urine Urobilinogen     (<2.0)  mg/dL


 


Ur Leukocyte Esterase     (Negative)  


 


Urine RBC     (0-5)  /hpf


 


Urine WBC     (0-5)  /hpf


 


Ur Squamous Epith Cells     (0-4)  /hpf


 


Urine Bacteria     (None)  /hpf


 


Hyaline Casts     (0-2)  /lpf


 


Urine Mucus     (None)  /hpf














  01/15/18 01/15/18 01/15/18 Range/Units





  15:30 15:30 15:50 


 


WBC     (3.8-10.6)  k/uL


 


RBC     (4.30-5.90)  m/uL


 


Hgb     (13.0-17.5)  gm/dL


 


Hct     (39.0-53.0)  %


 


MCV     (80.0-100.0)  fL


 


MCH     (25.0-35.0)  pg


 


MCHC     (31.0-37.0)  g/dL


 


RDW     (11.5-15.5)  %


 


Plt Count     (150-450)  k/uL


 


Neutrophils % (Manual)     %


 


Lymphocytes % (Manual)     %


 


Monocytes % (Manual)     %


 


Neutrophils # (Manual)     (1.3-7.7)  k/uL


 


Lymphocytes # (Manual)     (1.0-4.8)  k/uL


 


Monocytes # (Manual)     (0-1.0)  k/uL


 


Nucleated RBCs     (0-0)  /100 WBC


 


Polychromasia     


 


Hypochromasia     


 


Poikilocytosis     


 


Anisocytosis     


 


Microcytosis     


 


PT   11.3   (9.0-12.0)  sec


 


INR   1.2 H   (<1.2)  


 


APTT   28.5   (22.0-30.0)  sec


 


Sodium     (137-145)  mmol/L


 


Potassium     (3.5-5.1)  mmol/L


 


Chloride     ()  mmol/L


 


Carbon Dioxide     (22-30)  mmol/L


 


Anion Gap     mmol/L


 


BUN     (9-20)  mg/dL


 


Creatinine     (0.66-1.25)  mg/dL


 


Est GFR (MDRD) Af Amer     (>60 ml/min/1.73 sqM)  


 


Est GFR (MDRD) Non-Af     (>60 ml/min/1.73 sqM)  


 


Glucose     (74-99)  mg/dL


 


Plasma Lactic Acid Len  1.2    (0.7-2.0)  mmol/L


 


Calcium     (8.4-10.2)  mg/dL


 


Total Bilirubin     (0.2-1.3)  mg/dL


 


AST     (17-59)  U/L


 


ALT     (21-72)  U/L


 


Alkaline Phosphatase     ()  U/L


 


Total Creatine Kinase     ()  U/L


 


CK-MB (CK-2)     (0.0-2.4)  ng/mL


 


CK-MB (CK-2) Rel Index     


 


Troponin I     (0.000-0.034)  ng/mL


 


Total Protein     (6.3-8.2)  g/dL


 


Albumin     (3.5-5.0)  g/dL


 


Amylase     ()  U/L


 


Lipase     ()  U/L


 


Urine Color    Yellow  


 


Urine Appearance    Clear  (Clear)  


 


Urine pH    6.0  (5.0-8.0)  


 


Ur Specific Gravity    1.021  (1.001-1.035)  


 


Urine Protein    1+ H  (Negative)  


 


Urine Glucose (UA)    Negative  (Negative)  


 


Urine Ketones    Negative  (Negative)  


 


Urine Blood    Negative  (Negative)  


 


Urine Nitrite    Negative  (Negative)  


 


Urine Bilirubin    Negative  (Negative)  


 


Urine Urobilinogen    <2.0  (<2.0)  mg/dL


 


Ur Leukocyte Esterase    Trace H  (Negative)  


 


Urine RBC    3  (0-5)  /hpf


 


Urine WBC    10 H  (0-5)  /hpf


 


Ur Squamous Epith Cells    1  (0-4)  /hpf


 


Urine Bacteria    Rare H  (None)  /hpf


 


Hyaline Casts    2  (0-2)  /lpf


 


Urine Mucus    Few H  (None)  /hpf














Disposition


Clinical Impression: 


 Abdominal pain, Nausea and vomiting, Hypotension, Non-Hodgkin lymphoma, 

Duodenal perforation, Colitis, Free fluid in pelvis, Inflammation of colonic 

mucosa, Anemia, Leukocytosis





Disposition: OTHER INSTITUTION NOT DEFINED


Time of Disposition: 17:48





- Out of Hospital Transfer - Req. Specs


Out of Hospital Transfer - Requested Specifics: Other Emergency Center ([facility])

## 2018-01-15 NOTE — CT
EXAMINATION TYPE: CT abdomen pelvis w con

 

DATE OF EXAM: 1/15/2018

 

COMPARISON: 11/24/2017

 

HISTORY: Abdominal pain

 

CT DLP: mGycm

Automated exposure control for dose reduction was used.

 

TECHNIQUE:  Helical acquisition of images was performed from the lung bases through the pelvis.

 

CONTRAST: 

IV contrast was Omnipaque 100 mL.

 

FINDINGS: 

 

There is minimal linear density at the left posterior lung base consistent with subsegmental atelecta
sis. Heart size is normal. There is hiatal hernia.

 

Liver and spleen appear normal. There are numerous surgical clips around the stomach. There is a drai
nage catheter in the upper anterior abdomen. There is no evidence of a pancreatic mass. There is exte
nsive fat stranding in the left upper quadrant in the region of the lesser sac and there is some wall
 thickening involving the splenic flexure of the colon. I see no definite free air.

 

There is free fluid in the pelvis. Bladder distends smoothly.

 

Bile ducts are not dilated.

 

There is no adrenal mass. Kidneys show satisfactory contrast opacification. There is no hydronephrosi
s. There is no retroperitoneal adenopathy. I see no bony destructive process.

IMPRESSION: 

INFLAMMATORY CHANGES IN THE LEFT UPPER QUADRANT INFERIOR AND POSTERIOR TO THE STOMACH. EXTENSIVE RAMY
RENU SURGERY. PIGTAIL DRAINAGE CATHETER NOTED IN THE EPIGASTRIUM. THERE IS INCREASED FREE FLUID IN THE
 PELVIS COMPARED TO LAST EXAM. WALL THICKENING OF THE SPLENIC FLEXURE OF THE COLON COULD BE SECONDARY
 INFLAMMATORY PROCESS.

 

THERE IS DILATED DUODENUM ON THE OLD EXAM THAT IS NOT PRESENT ON TODAY'S EXAM. FAT STRANDING AND INFL
AMMATORY CHANGES ARE ADJACENT TO THE FOURTH PART OF THE DUODENUM AND THE POSSIBILITY OF A PERFORATION
 CANNOT BE EXCLUDED.

## 2018-01-26 ENCOUNTER — HOSPITAL ENCOUNTER (OUTPATIENT)
Dept: HOSPITAL 47 - RADCTMAIN | Age: 58
Discharge: HOME | End: 2018-01-26
Payer: COMMERCIAL

## 2018-01-26 DIAGNOSIS — C83.33: Primary | ICD-10-CM

## 2018-01-26 LAB — BUN SERPL-SCNC: 14 MG/DL (ref 9–20)

## 2018-01-26 PROCEDURE — 84520 ASSAY OF UREA NITROGEN: CPT

## 2018-01-26 PROCEDURE — 71260 CT THORAX DX C+: CPT

## 2018-01-26 PROCEDURE — 36415 COLL VENOUS BLD VENIPUNCTURE: CPT

## 2018-01-26 PROCEDURE — 74177 CT ABD & PELVIS W/CONTRAST: CPT

## 2018-01-26 PROCEDURE — 82565 ASSAY OF CREATININE: CPT

## 2018-01-26 NOTE — CT
EXAMINATION TYPE: CT ChestAbdPelvis w con

 

DATE OF EXAM: 1/26/2018

 

COMPARISON: 1/15/2018, 11/17/2017, and 10/11/2017

 

HISTORY: Stomach cancer, NonHodgkins Lymphoma

 

CT DLP: 509.8 mGycm. Automated Exposure Control for Dose Reduction was Utilized.

 

 

CONTRAST: 

CT scan of the thorax, abdomen and pelvis is performed with IV Contrast, patient injected with 100 mL
 of Omnipaque 300.

 

FINDINGS:

 

LUNGS: The lungs are grossly clear, there is no concerning parenchymal mass or nodule identified.   T
here is no pleural effusion or pneumothorax seen.  The tracheobronchial tree is patent.

 

MEDIASTINUM: There are no greater than 1 cm hilar or mediastinal lymph nodes.   No pericardial effusi
on is seen.  

 

OTHER: Moderate three-vessel coronary artery calcifications are noted.

 

LIVER/GB: Liver is unremarkable other than a focal area of hypoattenuation in segment IVb of the live
r, most commonly related to focal fatty infiltration. Gallbladder is unremarkable.

 

PANCREAS: No significant abnormality is seen.

 

SPLEEN: No significant abnormality is seen.

 

ADRENALS: No significant abnormality is seen.

 

KIDNEYS: No significant abnormality is seen.

 

BOWEL: Extensive postsurgical changes are seen of the stomach. Along the greater curvature the stomac
h within the left upper quadrant there is a fluid collection measuring 14.3 x 6.5 cm. This is progres
sed from the prior exam of 11/17/2017 where it measured approximately 10.8 x 7.5 cm. A second peripan
creatic fluid collection along the lesser curvature measures 3.9 x 4.2 cm on series 5 image 31. Surgi
sharon drainage catheter is coiled within the upper abdomen around the gastric antrum at the postsurgica
l site.

 

Left mid abdominal anterior small bowel anastomosis is noted. No evidence of bowel obstruction. Moder
ate retained colonic debris. Few loops of small bowel clustered within the low pelvis demonstrate sma
ll bowel feces sign indicative of stasis.

 

GENITAL ORGANS: No gross abnormality seen.

 

LYMPH NODES: There is been interval decrease in size of the adenopathy with the largest left paracent
ral mesenteric lymph node on series 3 image 74 measuring 1.9 x 2.1 cm. The previously seen adenopathy
 encasing the SMA and SMV have markedly decreased in size with branches of the SMV surrounded by a co
nglomeration on series 3 image 88 measuring approximately 1.4 x 3.8 cm and previously measuring 3.9 x
 7.8 x 4.0 cm. Smaller mildly enlarged additional matted lymph nodes are seen within the central abdo
men, notably on series 3 image 73. These predominate around the lesser curvature.

 

OSSEOUS STRUCTURES: No significant abnormality is seen.

 

IMPRESSION:

1. Interval increase in size of the left upper quadrant perigastric fluid collection now measuring 14
.3 x 6.5 cm and previously measuring 10.8 x 7.5 cm on the exam of 11/17/2017. Additional smaller flui
d collection around the lesser curvature measures 3.9 x 4.2 cm.

2. Decrease in degree of abdominal adenopathy in comparison to the prior.

3. No evidence of adenopathy within the chest. No suspicious visceral lesions to indicate metastasis.
 No splenomegaly.

## 2018-02-04 ENCOUNTER — HOSPITAL ENCOUNTER (EMERGENCY)
Dept: HOSPITAL 47 - EC | Age: 58
Discharge: HOME | End: 2018-02-04
Payer: COMMERCIAL

## 2018-02-04 VITALS
HEART RATE: 89 BPM | RESPIRATION RATE: 15 BRPM | TEMPERATURE: 97 F | DIASTOLIC BLOOD PRESSURE: 59 MMHG | SYSTOLIC BLOOD PRESSURE: 109 MMHG

## 2018-02-04 DIAGNOSIS — Z85.72: ICD-10-CM

## 2018-02-04 DIAGNOSIS — Z79.52: ICD-10-CM

## 2018-02-04 DIAGNOSIS — Z87.891: ICD-10-CM

## 2018-02-04 DIAGNOSIS — Z90.49: ICD-10-CM

## 2018-02-04 DIAGNOSIS — Z88.8: ICD-10-CM

## 2018-02-04 DIAGNOSIS — Z79.899: ICD-10-CM

## 2018-02-04 DIAGNOSIS — T85.598A: Primary | ICD-10-CM

## 2018-02-04 PROCEDURE — 99283 EMERGENCY DEPT VISIT LOW MDM: CPT

## 2018-02-04 NOTE — ED
General Adult HPI





- General


Chief complaint: Recheck/Abnormal Lab/Rx


Stated complaint: Bio bag/leaking


Time Seen by Provider: 18 13:34


Source: patient, family, RN notes reviewed, old records reviewed


Mode of arrival: ambulatory


Limitations: no limitations





- History of Present Illness


Initial comments: 





57-year-old male presents for evaluation of leaking around his biliary drain.  

Patient has history of non-Hodgkin's lymphoma, currently on chemotherapy.  

Patient has a drain in place which she believes is draining bile.  He is on 

certain of the exact location of the joint.  This is been present for several 

months.  He tugged on the street and accidentally in his sleep, and noticed 

some bilious drainage around the tubing.  He has no pain.  No fever or chills.  

No abdominal pain.  No nausea vomiting.  No jaundice.  Patient has no 

complaints other than the leakage of this fluid.  He was told that the drain 

may be able to be removed although he is uncertain when this is planned.





- Related Data


 Home Medications











 Medication  Instructions  Recorded  Confirmed


 


Metoclopramide [Reglan] 10 mg PO QID PRN 17


 


HYDROcodone/APAP 7.5-325MG [Norco 1 tab PO Q6HR PRN 17





7.5-325]   


 


Ergocalciferol [Vitamin D2 50,000 unit PO WE 01/15/18 02/04/18





(DRISDOL)]   


 


predniSONE 100 mg PO AS DIRECTED 01/15/18 02/04/18


 


Omeprazole 40 mg PO BID 18











 Allergies











Allergy/AdvReac Type Severity Reaction Status Date / Time


 


medication for TB exposure Allergy  Unknown Uncoded 18 13:36














Review of Systems


ROS Statement: 


Those systems with pertinent positive or pertinent negative responses have been 

documented in the HPI.





ROS Other: All systems not noted in ROS Statement are negative.





Past Medical History


Past Medical History: Cancer


Additional Past Medical History / Comment(s): anemia, hypotension


History of Any Multi-Drug Resistant Organisms: None Reported


Past Surgical History: Appendectomy, Orthopedic Surgery


Additional Past Surgical History / Comment(s): torn acl and several scopes of 

bilat knees, partial gastrectomy A COUPLE MONTHS AGO due to perforated ulcer. G 

tube


Past Anesthesia/Blood Transfusion Reactions: No Reported Reaction


Past Psychological History: No Psychological Hx Reported


Smoking Status: Former smoker


Past Alcohol Use History: None Reported


Past Drug Use History: None Reported





- Past Family History


  ** Father


Family Medical History: Pneumonia


Additional Family Medical History / Comment(s): Father had anemia.  He  at 

the age of 79yrs from complications after a ingrown toenail was removed and 

then became infected and had to have amputation.





  ** Mother


Family Medical History: Dementia


Additional Family Medical History / Comment(s): Mother  of dementia at the 

age of 82 yrs.





General Exam


Limitations: no limitations


General appearance: alert, in no apparent distress, other (No jaundice)


Head exam: Present: atraumatic, normocephalic


Eye exam: Present: normal appearance, PERRL.  Absent: scleral icterus


ENT exam: Present: normal exam


Neck exam: Present: normal inspection.  Absent: tenderness, meningismus


Respiratory exam: Present: normal lung sounds bilaterally.  Absent: respiratory 

distress, wheezes


Cardiovascular Exam: Present: regular rate, normal rhythm


GI/Abdominal exam: Present: soft, other (Biliary drain in place, there is no 

erythema or induration around the drain insertion site, no active drainage at 

this time.).  Absent: distended, tenderness, guarding, rebound


Extremities exam: Present: normal inspection, normal capillary refill.  Absent: 

pedal edema


Neurological exam: Present: alert, oriented X3.  Absent: motor sensory deficit


Psychiatric exam: Present: normal affect, normal mood


Skin exam: Present: warm, dry, intact, normal color.  Absent: rash





Course


 Vital Signs











  18





  13:19


 


Temperature 97 F L


 


Pulse Rate 89


 


Respiratory 15





Rate 


 


Blood Pressure 109/59


 


O2 Sat by Pulse 100





Oximetry 














Medical Decision Making





- Medical Decision Making





57-year-old male with bilious drainage around his abdominal drain.  Patient has 

no other symptoms, otherwise well-appearing, no jaundice, no abdominal pain or 

signs of infection.  Patient will follow up with his oncologist and make an 

appointment with his surgeon.  They will monitor the leakage of fluid as there 

is no fluid leaking at this time.  Also monitor for signs of local infection.  

The drain itself is draining as usual, no changes in the amount or consistency 

of fluid.  No purulence noted.  Patient and his wife are instructed to return 

with any development of fever, abdominal pain, jaundice, or vomiting.





Disposition


Clinical Impression: 


 Non-Hodgkin lymphoma, Biliary drain displacement





Disposition: HOME SELF-CARE


Condition: Fair


Instructions:  [facility] (ED)


Referrals: 


Ibrahima Wilkerson DO [Primary Care Provider] - 1-2 days


Christopher Pardo MD [STAFF PHYSICIAN] - 1-2 days


Time of Disposition: 13:59

## 2018-02-07 ENCOUNTER — HOSPITAL ENCOUNTER (EMERGENCY)
Dept: HOSPITAL 47 - EC | Age: 58
Discharge: OTHER INSTITUTION | End: 2018-02-07
Payer: COMMERCIAL

## 2018-02-07 VITALS — SYSTOLIC BLOOD PRESSURE: 98 MMHG | HEART RATE: 130 BPM | DIASTOLIC BLOOD PRESSURE: 50 MMHG

## 2018-02-07 VITALS — TEMPERATURE: 98 F

## 2018-02-07 VITALS — RESPIRATION RATE: 18 BRPM

## 2018-02-07 DIAGNOSIS — Z79.899: ICD-10-CM

## 2018-02-07 DIAGNOSIS — Z88.8: ICD-10-CM

## 2018-02-07 DIAGNOSIS — Z79.52: ICD-10-CM

## 2018-02-07 DIAGNOSIS — Z85.72: ICD-10-CM

## 2018-02-07 DIAGNOSIS — Z90.49: ICD-10-CM

## 2018-02-07 DIAGNOSIS — Z87.891: ICD-10-CM

## 2018-02-07 DIAGNOSIS — R00.0: ICD-10-CM

## 2018-02-07 DIAGNOSIS — K31.1: Primary | ICD-10-CM

## 2018-02-07 DIAGNOSIS — R07.9: ICD-10-CM

## 2018-02-07 DIAGNOSIS — R10.13: ICD-10-CM

## 2018-02-07 DIAGNOSIS — Z90.3: ICD-10-CM

## 2018-02-07 DIAGNOSIS — R11.0: ICD-10-CM

## 2018-02-07 DIAGNOSIS — D64.9: ICD-10-CM

## 2018-02-07 LAB
ALBUMIN SERPL-MCNC: 3.7 G/DL (ref 3.5–5)
ALP SERPL-CCNC: 316 U/L (ref 38–126)
ALT SERPL-CCNC: 47 U/L (ref 21–72)
AMYLASE SERPL-CCNC: 44 U/L (ref 30–110)
ANION GAP SERPL CALC-SCNC: 16 MMOL/L
APTT BLD: 19.6 SEC (ref 22–30)
AST SERPL-CCNC: 38 U/L (ref 17–59)
BUN SERPL-SCNC: 21 MG/DL (ref 9–20)
CALCIUM SPEC-MCNC: 9.3 MG/DL (ref 8.4–10.2)
CELLS COUNTED: 100
CHLORIDE SERPL-SCNC: 104 MMOL/L (ref 98–107)
CK SERPL-CCNC: <20 U/L (ref 55–170)
CO2 SERPL-SCNC: 17 MMOL/L (ref 22–30)
ERYTHROCYTE [DISTWIDTH] IN BLOOD BY AUTOMATED COUNT: 4.34 M/UL (ref 4.3–5.9)
ERYTHROCYTE [DISTWIDTH] IN BLOOD: 22.5 % (ref 11.5–15.5)
GLUCOSE SERPL-MCNC: 170 MG/DL (ref 74–99)
HCT VFR BLD AUTO: 36.3 % (ref 39–53)
HGB BLD-MCNC: 10.9 GM/DL (ref 13–17.5)
INR PPP: 1.1 (ref ?–1.2)
LIPASE SERPL-CCNC: 62 U/L (ref 23–300)
LYMPHOCYTES # BLD MANUAL: 0.48 K/UL (ref 1–4.8)
MAGNESIUM SPEC-SCNC: 2 MG/DL (ref 1.6–2.3)
MCH RBC QN AUTO: 25.2 PG (ref 25–35)
MCHC RBC AUTO-ENTMCNC: 30.1 G/DL (ref 31–37)
MCV RBC AUTO: 83.7 FL (ref 80–100)
MONOCYTES # BLD MANUAL: 0.24 K/UL (ref 0–1)
NEUTROPHILS NFR BLD MANUAL: 94 %
NEUTS SEG # BLD MANUAL: 11.19 K/UL (ref 1.3–7.7)
PLATELET # BLD AUTO: 187 K/UL (ref 150–450)
POTASSIUM SERPL-SCNC: 4.5 MMOL/L (ref 3.5–5.1)
PROT SERPL-MCNC: 6 G/DL (ref 6.3–8.2)
PT BLD: 10.9 SEC (ref 9–12)
SODIUM SERPL-SCNC: 137 MMOL/L (ref 137–145)
TROPONIN I SERPL-MCNC: 0.02 NG/ML (ref 0–0.03)
WBC # BLD AUTO: 11.9 K/UL (ref 3.8–10.6)

## 2018-02-07 PROCEDURE — 82150 ASSAY OF AMYLASE: CPT

## 2018-02-07 PROCEDURE — 74177 CT ABD & PELVIS W/CONTRAST: CPT

## 2018-02-07 PROCEDURE — 83690 ASSAY OF LIPASE: CPT

## 2018-02-07 PROCEDURE — 80053 COMPREHEN METABOLIC PANEL: CPT

## 2018-02-07 PROCEDURE — 85610 PROTHROMBIN TIME: CPT

## 2018-02-07 PROCEDURE — 71046 X-RAY EXAM CHEST 2 VIEWS: CPT

## 2018-02-07 PROCEDURE — 85730 THROMBOPLASTIN TIME PARTIAL: CPT

## 2018-02-07 PROCEDURE — 96375 TX/PRO/DX INJ NEW DRUG ADDON: CPT

## 2018-02-07 PROCEDURE — 36415 COLL VENOUS BLD VENIPUNCTURE: CPT

## 2018-02-07 PROCEDURE — 43753 TX GASTRO INTUB W/ASP: CPT

## 2018-02-07 PROCEDURE — 84484 ASSAY OF TROPONIN QUANT: CPT

## 2018-02-07 PROCEDURE — 85025 COMPLETE CBC W/AUTO DIFF WBC: CPT

## 2018-02-07 PROCEDURE — 99285 EMERGENCY DEPT VISIT HI MDM: CPT

## 2018-02-07 PROCEDURE — 93005 ELECTROCARDIOGRAM TRACING: CPT

## 2018-02-07 PROCEDURE — 96361 HYDRATE IV INFUSION ADD-ON: CPT

## 2018-02-07 PROCEDURE — 96374 THER/PROPH/DIAG INJ IV PUSH: CPT

## 2018-02-07 PROCEDURE — 96376 TX/PRO/DX INJ SAME DRUG ADON: CPT

## 2018-02-07 PROCEDURE — 82553 CREATINE MB FRACTION: CPT

## 2018-02-07 PROCEDURE — 82550 ASSAY OF CK (CPK): CPT

## 2018-02-07 PROCEDURE — 83735 ASSAY OF MAGNESIUM: CPT

## 2018-02-07 PROCEDURE — 74018 RADEX ABDOMEN 1 VIEW: CPT

## 2018-02-07 NOTE — P.GSCN
History of Present Illness


Consult date: 18


Reason for Consult: 





Abdominal pain


Requesting physician: Bob Osuna


History of present illness: 











CHIEF COMPLAINT: 


Chest including abdominal pain





HISTORY OF PRESENT ILLNESS:  


The patient is a 57-year-old gentleman known to me from her previous 

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  He then 

underwent a distal gastrectomy with Yisel-en-Y reconstruction.  He was doing 

very well 2 months following his surgery. He later developed nausea and 

vomiting and had a computed tomography scan in 2017.  His computed 

tomography scan findings were consistent with potential intra-abdominal 

lymphoma.  He was then transferred to [facility] 2017 at 

which time he was lost to follow-up.  He now presents to the emergency room 

after eating at [employer] earlier this morning where he had ham and cheese 

sandwich with his wife.  He developed acute onset epigastric including chest 

pain and was brought into the emergency room.  He had a computed tomography 

scan with findings demonstrating fluid-filled distal esophagus.  Per discussion 

with his wife, she brought me up to speed over his last 4 months of medical 

care.  The patient had an enteric tube placed for drainage of bile between the 

stomach and intestine.  Separately, the patient is actively undergoing 

chemotherapy.  She reports one week following this chemo, he would develop 

acute onset epigastric abdominal pain.  Separately, she reports that he was 

referred to another surgeon for removal of his enteric drain.





PAST MEDICAL HISTORY:  


See list.





PAST SURGICAL HISTORY: 


See list.





CURRENT MEDICATIONS:  


See list.





ALLERGIES:  


See list.





SOCIAL HISTORY:


He is .





FAMILY HISTORY:


Pertinent for anemia.





REVIEW OF ORGAN SYSTEMS:


CONSTITUTIONAL: Past history of sepsis.  Also 43 pound weight loss since his 

surgery 2017, 5 months ago.  He is actively undergoing chemo.


HEENT:  Denies any trouble with vision, hearing or nosebleeds.  


LYMPHATIC:  The patient denies any lumps and bumps around the neck. 


ENDOCRINE:  Denies any thyroid disorders. No blood sugar glucose intolerance.


RESPIRATORY:  Has shortness of breath.  No dyspnea on exertion.


CARDIOVASCULAR:  Has chest pain with exertion.  No palpitations.


GASTROINTESTINAL:  History of enteric biliary drain of the abdomen.


GENITOURINARY:  Denies any blood in urine or increased urinary frequency.  


MUSCULOSKELETAL:  Denies osteoarthritis including pain along the joints.


NEUROLOGIC:  Denies any numbness or tingling along the distal extremities. 


PSYCHIATRIC:  Denies any depression or suicidal ideation.


HEMATOLOGIC:  Denies any abnormal bleeding or bruising.       





PHYSICAL EXAMINATION:  


GENERAL:  Well developed and in mild distress.  Lethargic from previous 

sedation.


HEENT:  No sclera icterus. Extraocular movements grossly intact.  Dry buccal 

mucosa. Head is atraumatic, normocephalic. Hears conversational speech. No 

nasal drainage.  Nasogastric tube within the nares.


NECK:  Supple without lymphadenopathy. No JV distention.


CHEST:  Labored respirations and equal bilateral excursions. 


CARDIOVASCULAR:  Tachycardic Palpable 2+ radial pulses.


ABDOMEN:  Biliary enteric drain intact.  No cellulitis.  Tenderness along the 

epigastrium.  No peritonitis.


MUSCULOSKELETAL:  No clubbing, cyanosis or edema.


NEUROLOGIC:  No focal or lateralizing signs.  Cranial nerves II-12 grossly 

intact.


PSYCH:  Appropriate affect.  Alert and oriented to person, place and time.


SKIN: Well perfused.  Slow skin turgor.





LABS: 


Reviewed.





STUDIES: CT of the abdomen and pelvis reviewed demonstrating dilated stomach 

including fluid collection of the abdomen.  No evidence of free air.








ASSESSMENT:


1.   History of bilioenteric drain.


2.   Non-Hodgkin's lymphoma.


3.   Recent chemotherapy.


4.   Dehydration.





PLAN:


1.   I thanked his wife and the patient for giving me information on his care 

in the last 4 months.  At this time, the patient is prepared for transfer to 

[facility] to address malfunction of his radiological enteric biliary 

drain.


2.   As for any continued surgical care, the patient and family may seek 

additional care with myself or any other surgeon whom they desire.


3.   At the time of my examination, he demonstrated acute dehydration for 

traditional IV fluid resuscitation was requested.





   Thank you very much for allowing me to participate in the care of your 

patient.





Past Medical History


Past Medical History: Cancer


Additional Past Medical History / Comment(s): anemia, hypotension, lymphoma 

stage 2 non-hodgkins


History of Any Multi-Drug Resistant Organisms: None Reported


Past Surgical History: Appendectomy, Orthopedic Surgery


Additional Past Surgical History / Comment(s): torn acl and several scopes of 

bilat knees, partial gastrectomy A COUPLE MONTHS AGO due to perforated ulcer. G 

tube


Past Anesthesia/Blood Transfusion Reactions: No Reported Reaction


Past Psychological History: No Psychological Hx Reported


Smoking Status: Former smoker


Past Alcohol Use History: None Reported


Past Drug Use History: None Reported





- Past Family History


  ** Father


Family Medical History: Pneumonia


Additional Family Medical History / Comment(s): Father had anemia.  He  at 

the age of 79yrs from complications after a ingrown toenail was removed and 

then became infected and had to have amputation.





  ** Mother


Family Medical History: Dementia


Additional Family Medical History / Comment(s): Mother  of dementia at the 

age of 82 yrs.





Medications and Allergies


 Home Medications











 Medication  Instructions  Recorded  Confirmed  Type


 


Metoclopramide [Reglan] 10 mg PO QID PRN 17 History


 


HYDROcodone/APAP 7.5-325MG [Norco 1 tab PO Q6HR PRN 17 History





7.5-325]    


 


Ergocalciferol [Vitamin D2 50,000 unit PO WE 01/15/18 02/07/18 History





(DRISDOL)]    


 


predniSONE 100 mg PO AS DIRECTED 01/15/18 02/07/18 History


 


Omeprazole 40 mg PO BID 18 History











 Allergies











Allergy/AdvReac Type Severity Reaction Status Date / Time


 


medication for TB exposure Allergy  Unknown Uncoded 18 13:45














Surgical - Exam


 Vital Signs











Temp Pulse Resp BP Pulse Ox


 


 97 F L  111 H  28 H  115/71   100 


 


 18 12:33  18 12:33  18 12:33  18 12:33  18 12:33














Results





- Labs





 18 13:15





 18 13:15


 Abnormal Lab Results - Last 24 Hours (Table)











  18 Range/Units





  13:15 13:15 13:15 


 


WBC    11.9 H  (3.8-10.6)  k/uL


 


Hgb    10.9 L  (13.0-17.5)  gm/dL


 


Hct    36.3 L  (39.0-53.0)  %


 


MCHC    30.1 L  (31.0-37.0)  g/dL


 


RDW    22.5 H  (11.5-15.5)  %


 


Neutrophils # (Manual)    11.19 H  (1.3-7.7)  k/uL


 


Lymphocytes # (Manual)    0.48 L  (1.0-4.8)  k/uL


 


APTT     (22.0-30.0)  sec


 


Carbon Dioxide  17 L    (22-30)  mmol/L


 


BUN  21 H    (9-20)  mg/dL


 


Glucose  170 H    (74-99)  mg/dL


 


Alkaline Phosphatase  316 H    ()  U/L


 


Total Creatine Kinase   <20 L   ()  U/L


 


Total Protein  6.0 L    (6.3-8.2)  g/dL














  18 Range/Units





  13:15 


 


WBC   (3.8-10.6)  k/uL


 


Hgb   (13.0-17.5)  gm/dL


 


Hct   (39.0-53.0)  %


 


MCHC   (31.0-37.0)  g/dL


 


RDW   (11.5-15.5)  %


 


Neutrophils # (Manual)   (1.3-7.7)  k/uL


 


Lymphocytes # (Manual)   (1.0-4.8)  k/uL


 


APTT  19.6 L  (22.0-30.0)  sec


 


Carbon Dioxide   (22-30)  mmol/L


 


BUN   (9-20)  mg/dL


 


Glucose   (74-99)  mg/dL


 


Alkaline Phosphatase   ()  U/L


 


Total Creatine Kinase   ()  U/L


 


Total Protein   (6.3-8.2)  g/dL








 Diabetes panel











  18 Range/Units





  13:15 


 


Sodium  137  (137-145)  mmol/L


 


Potassium  4.5  (3.5-5.1)  mmol/L


 


Chloride  104  ()  mmol/L


 


Carbon Dioxide  17 L  (22-30)  mmol/L


 


BUN  21 H  (9-20)  mg/dL


 


Creatinine  0.94  (0.66-1.25)  mg/dL


 


Glucose  170 H  (74-99)  mg/dL


 


Calcium  9.3  (8.4-10.2)  mg/dL


 


AST  38  (17-59)  U/L


 


ALT  47  (21-72)  U/L


 


Alkaline Phosphatase  316 H  ()  U/L


 


Total Protein  6.0 L  (6.3-8.2)  g/dL


 


Albumin  3.7  (3.5-5.0)  g/dL








 Calcium panel











  18 Range/Units





  13:15 


 


Calcium  9.3  (8.4-10.2)  mg/dL


 


Albumin  3.7  (3.5-5.0)  g/dL








 Pituitary panel











  18 Range/Units





  13:15 


 


Sodium  137  (137-145)  mmol/L


 


Potassium  4.5  (3.5-5.1)  mmol/L


 


Chloride  104  ()  mmol/L


 


Carbon Dioxide  17 L  (22-30)  mmol/L


 


BUN  21 H  (9-20)  mg/dL


 


Creatinine  0.94  (0.66-1.25)  mg/dL


 


Glucose  170 H  (74-99)  mg/dL


 


Calcium  9.3  (8.4-10.2)  mg/dL








 Adrenal panel











  18 Range/Units





  13:15 


 


Sodium  137  (137-145)  mmol/L


 


Potassium  4.5  (3.5-5.1)  mmol/L


 


Chloride  104  ()  mmol/L


 


Carbon Dioxide  17 L  (22-30)  mmol/L


 


BUN  21 H  (9-20)  mg/dL


 


Creatinine  0.94  (0.66-1.25)  mg/dL


 


Glucose  170 H  (74-99)  mg/dL


 


Calcium  9.3  (8.4-10.2)  mg/dL


 


Total Bilirubin  0.7  (0.2-1.3)  mg/dL


 


AST  38  (17-59)  U/L


 


ALT  47  (21-72)  U/L


 


Alkaline Phosphatase  316 H  ()  U/L


 


Total Protein  6.0 L  (6.3-8.2)  g/dL


 


Albumin  3.7  (3.5-5.0)  g/dL

## 2018-02-07 NOTE — XR
EXAMINATION TYPE: XR chest 2V

 

DATE OF EXAM: 2/7/2018

 

COMPARISON: 9/1/2017

 

HISTORY: Severe chest pain and rapid breathing

 

TECHNIQUE:  Frontal and lateral views of the chest are obtained.

 

FINDINGS:  New right-sided PICC has been inserted in the interim terminating in the superior vena cav
a just above the brachiocephalic junction, appropriately placed. No new focal opacity, pleural effusi
on or pneumothorax is seen. No pulmonary vascular congestion. Partial visualization of a left para mi
dline pigtail drainage catheter is seen within the upper abdomen. Cardia mediastinal silhouette is wi
thin normal limits. Osseous structures appear intact.

 

IMPRESSION:  

1. No acute cardiopulmonary process.

2. Interval insertion of a left para midline upper abdominal pigtail drainage catheter and appropriat
ely placed right PICC.

## 2018-02-07 NOTE — XR
EXAMINATION TYPE: XR KUB

 

DATE OF EXAM: 2/7/2018 4:19 PM

 

CLINICAL HISTORY:  Abdominal pain

 

TECHNIQUE:  Two AP portable supine KUB images of the abdomen are obtained.

 

COMPARISON: CT abdomen pelvis earlier today..

 

FINDINGS: New nasogastric tube is noted epigastric region. Surgical sutures below this are redemonstr
ated. Medial to this there is percutaneous pigtail drainage catheter redemonstrated. There are additi
onal surgical sutures in the left mid to lower abdomen. There is overall nonobstructive bowel gas pat
tern. Visualized osseous structures are intact.

 

IMPRESSION: New nasogastric tube projecting below left hemidiaphragm. Overall nonobstructive bowel ga
s pattern remains present.

## 2018-02-07 NOTE — CT
EXAMINATION TYPE: CT abdomen pelvis w con

 

DATE OF EXAM: 2/7/2018

 

COMPARISON: CT chest abdomen and pelvis January 26, 2018

 

HISTORY: Patient complains of generalized abdominal pain, nausea, and vomiting.

 

CT DLP: 424.2 mGycm, Automated Exposure Control for Dose Reduction was Utilized.

 

CONTRAST: 

CT scan of the abdomen and pelvis is performed without oral but with IV Contrast, patient injected wi
th 100 mL of Omnipaque 300.

 

FINDINGS:

 

LUNG BASES:  No significant abnormality is appreciated.

 

LIVER/GB: New mild to moderate surrounding ascites is present most prominent inferiorly.

 

PANCREAS:  No significant abnormality is seen.

 

SPLEEN: New moderate amount of surrounding ascites is present with new more heterogeneous enhancement
 or appearance.

 

ADRENALS:  No significant abnormality is seen.

 

KIDNEYS: There is symmetric cortical medullary uptake but no visualized excretion bilaterally. No hyd
ronephrosis is noted bilaterally. There is new mild to moderate wall thickening measuring up to 12 mm
 posteriorly, a cystitis needs to BE excluded. Bladder is poorly distended.

 

BOWEL: There is new fluid filled dilatation of esophagus. There is redemonstration of surgical change
 epigastric region. There is fluid-filled dilatation of stomach at the epigastric region redemonstrat
ed. There is adjacent fluid filled structure with air-fluid level left inferior aspect redemonstrated
. No suspicious fluid-filled dilatation of duodenal sweep is seen on current study. Consider new outl
et obstruction possibly related to adhesions. There is no suspicious small or large bowel dilatation 
otherwise identified. There is mild to moderate wall thickening throughout all visualized small bowel
 loops.. There is persistent percutaneous pigtail catheter central to this anteriorly. There is mild 
to moderate wall thickening somewhat suspicious mid transverse colon coronal image 26. There is mild 
wall thickening proximal to mid left colon. There is mild wall thickening sigmoid rectal colon.

 

PROSTATE/SEMINAL VESICLES: Prostate gland is mildly prominent bulging on bladder base.

 

LYMPH NODES: Abnormal upper abdominal lymph nodes appear significantly improved from older studies, f
or reference left periaortic lymph node measures 1.9 x 1.5 cm. More prominent confluent adenopathy wa
s seen on older studies. Older studies were suboptimal in oral contrast was not given and adenopathy 
mimic adjacent bowel loops.

 

OSSEOUS STRUCTURES: Moderate joint space loss in both hips is redemonstrated. There is facet arthropa
thy lower lumbar levels redemonstrated.

 

OTHER: There is new moderate to severe pelvic fluid collection or ascites. There is small amount of s
cattered abdominal fluid or ascites new from prior.

 

IMPRESSION:

1. New fluid filled dilatation of distal esophagus and persistent fluid-filled dilatation of stomach 
with inferior surgical changes with now decompressed duodenal sweep, suspect new all at obstruction p
ossibly related from adhesions. Advise surgical consultation. Consider NG tube placement.

2. Persistent adjacent fluid collection with air-fluid level on current study of uncertain etiology, 
possible fistula or postsurgical seroma.

3. Persistent central anterior pigtail catheter anterior to pancreas of uncertain location felt stabl
e. No significant focal fluid noted at this level. No significant change from most recent prior.

4. Possible new diffuse enterocolitis, correlate clinically. 

5. Possible new acute cystitis, correlate clinically

 

Case discussed with ER physician via telephone at time of dictation.

## 2018-02-07 NOTE — ED
General Adult HPI





- General


Chief complaint: Chest Pain


Stated complaint: Chest Pain


Time Seen by Provider: 18 12:30


Source: patient, family, RN notes reviewed


Mode of arrival: wheelchair


Limitations: no limitations





- History of Present Illness


Initial comments: 





This is a 57-year-old male with non-Hodgkin's lymphoma.  Patient states about 4 

months ago he had half of his stomach removed because they believe it to be and 

also returned to be lymphoma.  Patient states since then he has had a biliary 

drain placed.  Patient states this morning he felt fine had breakfast 1 out 

shopping and all of a sudden started getting severe epigastric abdominal pain a 

little bit of chest pain.  Patient states the pain is gotten consistently worse 

he is very nauseated as well.  Patient denies any difficulty breathing or 

shortness of breath per patient denies any lightheadedness dizziness or near 

syncopal episode.  Patient denies any fevers or chills.





- Related Data


 Home Medications











 Medication  Instructions  Recorded  Confirmed


 


Metoclopramide [Reglan] 10 mg PO QID PRN 17


 


HYDROcodone/APAP 7.5-325MG [Norco 1 tab PO Q6HR PRN 17





7.5-325]   


 


Ergocalciferol [Vitamin D2 50,000 unit PO WE 01/15/18 02/07/18





(DRISDOL)]   


 


predniSONE 100 mg PO AS DIRECTED 01/15/18 02/07/18


 


Omeprazole 40 mg PO BID 18











 Allergies











Allergy/AdvReac Type Severity Reaction Status Date / Time


 


medication for TB exposure Allergy  Unknown Uncoded 18 13:45














Review of Systems


ROS Statement: 


Those systems with pertinent positive or pertinent negative responses have been 

documented in the HPI.





ROS Other: All systems not noted in ROS Statement are negative.





Past Medical History


Past Medical History: Cancer


Additional Past Medical History / Comment(s): anemia, hypotension, lymphoma 

stage 2 non-hodgkins


History of Any Multi-Drug Resistant Organisms: None Reported


Past Surgical History: Appendectomy, Orthopedic Surgery


Additional Past Surgical History / Comment(s): torn acl and several scopes of 

bilat knees, partial gastrectomy A COUPLE MONTHS AGO due to perforated ulcer. G 

tube


Past Anesthesia/Blood Transfusion Reactions: No Reported Reaction


Past Psychological History: No Psychological Hx Reported


Smoking Status: Former smoker


Past Alcohol Use History: None Reported


Past Drug Use History: None Reported





- Past Family History


  ** Father


Family Medical History: Pneumonia


Additional Family Medical History / Comment(s): Father had anemia.  He  at 

the age of 79yrs from complications after a ingrown toenail was removed and 

then became infected and had to have amputation.





  ** Mother


Family Medical History: Dementia


Additional Family Medical History / Comment(s): Mother  of dementia at the 

age of 82 yrs.





General Exam





- General Exam Comments


Initial Comments: 





GENERAL:


Patient is well-developed and well-nourished.  Patient is nontoxic and well-

hydrated and is in moderate distress.





ENT:


Neck is soft and supple.  No significant lymphadenopathy is noted.  Oropharynx 

is clear.  Moist mucous membranes.  Neck has full range of motion without 

eliciting any pain. 





EYES:


The sclera were anicteric and conjunctiva were pink and moist.  Extraocular 

movements were intact and pupils were equal round and reactive to light.  

Eyelids were unremarkable.





PULMONARY:


Unlabored respirations.  Good breath sounds bilaterally.  No audible rales 

rhonchi or wheezing was noted.





CARDIOVASCULAR:


There is a regular rate and rhythm without any murmurs gallops or rubs.  





ABDOMEN:


Patient's abdomen is diffusely tender more so in the epigastric region.  





SKIN:


Skin is clear with no lesions or rashes and otherwise unremarkable.





NEUROLOGIC:


Patient is alert and oriented x3.  Cranial nerves II through XII are grossly 

intact.  Motor and sensory are also intact.  Normal speech, volume and content.

  Symmetrical smile.  





MUSCULOSKELETAL:


Normal extremities with adequate strength and full range of motion.  No lower 

extremity swelling or edema.  No calf tenderness.





LYMPHATICS:


No significant lymphadenopathy is noted





PSYCHIATRIC:


Normal psychiatric evaluation.  Normal interpersonal interactions appears 

functionally intact in deals appropriately with others.  No signs of 

depression.  No signs of anxiety.  


Limitations: no limitations





Course


 Vital Signs











  18





  12:33 14:00 15:00


 


Temperature 97 F L  


 


Pulse Rate 111 H 129 H 132 H


 


Respiratory 28 H 20 20





Rate   


 


Blood Pressure 115/71 124/78 122/74


 


O2 Sat by Pulse 100 97 98





Oximetry   














  18





  16:48 18:00 18:30


 


Temperature  98 F 


 


Pulse Rate 129 H 124 H 127 H


 


Respiratory 20 20 18





Rate   


 


Blood Pressure 102/52 101/50 92/54


 


O2 Sat by Pulse 96 97 97





Oximetry   














  18





  19:00


 


Temperature 


 


Pulse Rate 130 H


 


Respiratory 18





Rate 


 


Blood Pressure 98/50


 


O2 Sat by Pulse 96





Oximetry 














Medical Decision Making





- Medical Decision Making





EKG shows sinus tachycardia at 105 bpm MA interval 228 QRS is 70 QT interval 

340 QTC is 449 per patient's EKG shows no ST segment elevation or depression or 

T wave abnormalities are noted.





Patient's CAT scan shows dilated esophagus which appears to be new and a 

possible all obstruction an NG tube was placed not much return was received a 

KUB was an additional good placement of the NG tube.





Deals NG was removed and an 18-Setswana will be placed.





I spoke with Dr. Baker she wanted the patient transferred and referred for 

Dr. Arthur called because he was somehow involved in the case later on and he 

wanted the patient transferred [facility].





I spoke with Dr. Maranda matute at [facility] and he accepted the transfer of this 

patient.





After patient had been accepted at [facility] Dr. Baker came down 

and saw the patient and after that she came out and told me that she did agree 

with transferring the patient to [facility].





- Lab Data


Result diagrams: 


 18 13:15





 18 13:15


 Lab Results











  18 Range/Units





  13:15 13:15 13:15 


 


WBC    11.9 H  (3.8-10.6)  k/uL


 


RBC    4.34  (4.30-5.90)  m/uL


 


Hgb    10.9 L  (13.0-17.5)  gm/dL


 


Hct    36.3 L  (39.0-53.0)  %


 


MCV    83.7  D  (80.0-100.0)  fL


 


MCH    25.2  (25.0-35.0)  pg


 


MCHC    30.1 L  (31.0-37.0)  g/dL


 


RDW    22.5 H  (11.5-15.5)  %


 


Plt Count    187  (150-450)  k/uL


 


Neutrophils % (Manual)    94  %


 


Lymphocytes % (Manual)    4  %


 


Monocytes % (Manual)    2  %


 


Neutrophils # (Manual)    11.19 H  (1.3-7.7)  k/uL


 


Lymphocytes # (Manual)    0.48 L  (1.0-4.8)  k/uL


 


Monocytes # (Manual)    0.24  (0-1.0)  k/uL


 


Nucleated RBCs    0  (0-0)  /100 WBC


 


Manual Slide Review    Performed  


 


Hypochromasia    Marked  


 


Poikilocytosis (manual    Present  


 


Anisocytosis    Moderate  


 


Microcytosis    Moderate  


 


PT     (9.0-12.0)  sec


 


INR     (<1.2)  


 


APTT     (22.0-30.0)  sec


 


Sodium  137    (137-145)  mmol/L


 


Potassium  4.5    (3.5-5.1)  mmol/L


 


Chloride  104    ()  mmol/L


 


Carbon Dioxide  17 L    (22-30)  mmol/L


 


Anion Gap  16    mmol/L


 


BUN  21 H    (9-20)  mg/dL


 


Creatinine  0.94    (0.66-1.25)  mg/dL


 


Est GFR (MDRD) Af Amer  >60    (>60 ml/min/1.73 sqM)  


 


Est GFR (MDRD) Non-Af  >60    (>60 ml/min/1.73 sqM)  


 


Glucose  170 H    (74-99)  mg/dL


 


Calcium  9.3    (8.4-10.2)  mg/dL


 


Magnesium  2.0    (1.6-2.3)  mg/dL


 


Total Bilirubin  0.7    (0.2-1.3)  mg/dL


 


AST  38    (17-59)  U/L


 


ALT  47    (21-72)  U/L


 


Alkaline Phosphatase  316 H    ()  U/L


 


Total Creatine Kinase   <20 L   ()  U/L


 


CK-MB (CK-2)   <0.2   (0.0-2.4)  ng/mL


 


CK-MB (CK-2) Rel Index      


 


Troponin I   0.017   (0.000-0.034)  ng/mL


 


Total Protein  6.0 L    (6.3-8.2)  g/dL


 


Albumin  3.7    (3.5-5.0)  g/dL


 


Amylase  44    ()  U/L


 


Lipase  62    ()  U/L














  18 Range/Units





  13:15 


 


WBC   (3.8-10.6)  k/uL


 


RBC   (4.30-5.90)  m/uL


 


Hgb   (13.0-17.5)  gm/dL


 


Hct   (39.0-53.0)  %


 


MCV   (80.0-100.0)  fL


 


MCH   (25.0-35.0)  pg


 


MCHC   (31.0-37.0)  g/dL


 


RDW   (11.5-15.5)  %


 


Plt Count   (150-450)  k/uL


 


Neutrophils % (Manual)   %


 


Lymphocytes % (Manual)   %


 


Monocytes % (Manual)   %


 


Neutrophils # (Manual)   (1.3-7.7)  k/uL


 


Lymphocytes # (Manual)   (1.0-4.8)  k/uL


 


Monocytes # (Manual)   (0-1.0)  k/uL


 


Nucleated RBCs   (0-0)  /100 WBC


 


Manual Slide Review   


 


Hypochromasia   


 


Poikilocytosis (manual   


 


Anisocytosis   


 


Microcytosis   


 


PT  10.9  (9.0-12.0)  sec


 


INR  1.1  (<1.2)  


 


APTT  19.6 L  (22.0-30.0)  sec


 


Sodium   (137-145)  mmol/L


 


Potassium   (3.5-5.1)  mmol/L


 


Chloride   ()  mmol/L


 


Carbon Dioxide   (22-30)  mmol/L


 


Anion Gap   mmol/L


 


BUN   (9-20)  mg/dL


 


Creatinine   (0.66-1.25)  mg/dL


 


Est GFR (MDRD) Af Amer   (>60 ml/min/1.73 sqM)  


 


Est GFR (MDRD) Non-Af   (>60 ml/min/1.73 sqM)  


 


Glucose   (74-99)  mg/dL


 


Calcium   (8.4-10.2)  mg/dL


 


Magnesium   (1.6-2.3)  mg/dL


 


Total Bilirubin   (0.2-1.3)  mg/dL


 


AST   (17-59)  U/L


 


ALT   (21-72)  U/L


 


Alkaline Phosphatase   ()  U/L


 


Total Creatine Kinase   ()  U/L


 


CK-MB (CK-2)   (0.0-2.4)  ng/mL


 


CK-MB (CK-2) Rel Index   


 


Troponin I   (0.000-0.034)  ng/mL


 


Total Protein   (6.3-8.2)  g/dL


 


Albumin   (3.5-5.0)  g/dL


 


Amylase   ()  U/L


 


Lipase   ()  U/L














Disposition


Clinical Impression: 


 Gastric outlet obstruction





Disposition: OTHER INSTITUTION NOT DEFINED


Referrals: 


Ibrahima Wilkerson DO [Primary Care Provider] - 1-2 days


Time of Disposition: 16:57





- Out of Hospital Transfer - Req. Specs


Out of Hospital Transfer - Requested Specifics: Other Emergency Center ([facility])

## 2018-04-25 ENCOUNTER — HOSPITAL ENCOUNTER (EMERGENCY)
Dept: HOSPITAL 47 - EC | Age: 58
Discharge: HOME | End: 2018-04-25
Payer: COMMERCIAL

## 2018-04-25 VITALS — RESPIRATION RATE: 18 BRPM

## 2018-04-25 VITALS — TEMPERATURE: 98.5 F | HEART RATE: 96 BPM | DIASTOLIC BLOOD PRESSURE: 65 MMHG | SYSTOLIC BLOOD PRESSURE: 136 MMHG

## 2018-04-25 DIAGNOSIS — Z88.8: ICD-10-CM
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
## 2018-04-25 DIAGNOSIS — Z85.72: ICD-10-CM

## 2018-04-25 DIAGNOSIS — Z79.899: ICD-10-CM

## 2018-04-25 DIAGNOSIS — Z79.52: ICD-10-CM

## 2018-04-25 DIAGNOSIS — S52.522A: Primary | ICD-10-CM
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
## 2018-04-25 DIAGNOSIS — Z87.891: ICD-10-CM

## 2018-04-25 PROCEDURE — 96374 THER/PROPH/DIAG INJ IV PUSH: CPT

## 2018-04-25 PROCEDURE — 73090 X-RAY EXAM OF FOREARM: CPT

## 2018-04-25 PROCEDURE — 29125 APPL SHORT ARM SPLINT STATIC: CPT

## 2018-04-25 PROCEDURE — 73110 X-RAY EXAM OF WRIST: CPT

## 2018-04-25 PROCEDURE — 99283 EMERGENCY DEPT VISIT LOW MDM: CPT

## 2018-04-25 NOTE — XR
EXAMINATION TYPE: XR wrist complete LT

 

DATE OF EXAM: 4/25/2018

 

COMPARISON: NONE

 

HISTORY: Pain after falling

 

TECHNIQUE: 4 views

 

FINDINGS: I see no fracture nor dislocation. Joint spaces are normal. Metacarpals appear intact. Scap
hoid bone is intact.

 

IMPRESSION: No acute abnormality of the left wrist.

## 2018-04-25 NOTE — XR
EXAMINATION TYPE: XR forearm LT

 

DATE OF EXAM: 4/25/2018

 

COMPARISON: NONE

 

HISTORY: Arm pain

 

TECHNIQUE: 3 views

 

FINDINGS: I see no fracture nor dislocation. Elbow joint and wrist joint appear intact.

 

IMPRESSION: Negative left forearm exam.

## 2018-04-25 NOTE — ED
General Adult HPI





- General


Chief complaint: Extremity Injury, Upper


Stated complaint: Fall, left wrist injury


Time Seen by Provider: 18 02:02


Source: patient, RN notes reviewed


Mode of arrival: wheelchair


Limitations: physical limitation





- History of Present Illness


Initial comments: 





Patient's a 58-year-old male presented to the emergency room today with a chief 

complaint of an injury to the left wrist area.  He does admit that he was 

leaving the hospital tripped falling forward onto his hands outstretched.  

Since does have abrasion to the right forearm.  He states he has increased pain 

to left wrist area.  This worse with any movements.  He states he does not 

believe that he hit his head.  He denies any loss conscious.  He states is not 

on any blood thinners. Patient denies any recent fever, chills, shortness of 

breath, chest pain, headaches or visual changes, or any other complaints.





- Related Data


 Home Medications











 Medication  Instructions  Recorded  Confirmed


 


Metoclopramide [Reglan] 10 mg PO QID PRN 17


 


HYDROcodone/APAP 7.5-325MG [Norco 1 tab PO Q6HR PRN 17





7.5-325]   


 


Ergocalciferol [Vitamin D2 50,000 unit PO WE 01/15/18 02/07/18





(DRISDOL)]   


 


predniSONE 100 mg PO AS DIRECTED 01/15/18 02/07/18


 


Omeprazole 40 mg PO BID 18











 Allergies











Allergy/AdvReac Type Severity Reaction Status Date / Time


 


medication for TB exposure Allergy  Unknown Uncoded 18 01:49














Review of Systems


ROS Statement: 


Those systems with pertinent positive or pertinent negative responses have been 

documented in the HPI.





ROS Other: All systems not noted in ROS Statement are negative.





Past Medical History


Past Medical History: Cancer


Additional Past Medical History / Comment(s): anemia, hypotension, lymphoma 

stage 2 non-hodgkins,  PICC lines RUE TPN, 18,


History of Any Multi-Drug Resistant Organisms: None Reported


Past Surgical History: Appendectomy, Orthopedic Surgery


Additional Past Surgical History / Comment(s): torn acl and several scopes of 

bilat knees, partial gastrectomy A COUPLE MONTHS AGO due to perforated ulcer. G 

tube,


Past Anesthesia/Blood Transfusion Reactions: No Reported Reaction


Past Psychological History: No Psychological Hx Reported


Smoking Status: Former smoker


Past Alcohol Use History: None Reported


Past Drug Use History: None Reported





- Past Family History


  ** Father


Family Medical History: Pneumonia


Additional Family Medical History / Comment(s): Father had anemia.  He  at 

the age of 79yrs from complications after a ingrown toenail was removed and 

then became infected and had to have amputation.





  ** Mother


Family Medical History: Dementia


Additional Family Medical History / Comment(s): Mother  of dementia at the 

age of 82 yrs.





General Exam





- General Exam Comments


Initial Comments: 





General:  The patient is awake and alert, in no distress, and does not appear 

acutely ill.   


Neck:  The neck is supple, there is no tenderness or JVD.  


Musculoskeletal: Patient has normal appearance of the left forearm, wrist and 

hand.  Shows limited range of motion due to pain.  He is tender over the distal 

ulna and radius.  Mild tenderness down onto the metacarpals 2 through 5.  Cap 

refill less than 2 seconds.  Sensations are intact.  Pulses are 2+.


Neurological:  A&O x 3. CN II-XII intact, There are no obvious motor or sensory 

deficits. Coordination appears grossly intact. Speech is normal.


Skin:  Skin is warm and dry and no rashes or lesions are noted. 


Psychiatric:  Normal mood and affect.  


Limitations: physical limitation





Course


 Vital Signs











  18





  01:43


 


Temperature 98.2 F


 


Pulse Rate 93


 


Respiratory 18





Rate 


 


Blood Pressure 135/83


 


O2 Sat by Pulse 98





Oximetry 














Medical Decision Making





- Medical Decision Making





X-ray reviewed and does show evidence for a buckle fracture of the distal 

radius.  Patient has been splinted in a short arm OCL volar splint.  

Neurovascular rechecked and intact.  Patient advised follow-up with his 

orthopedic doctor tomorrow.





Disposition


Clinical Impression: 


 Wrist fracture, left





Disposition: HOME SELF-CARE


Condition: Good


Instructions:  Wrist Fracture in Adults (ED)


Additional Instructions: 


Please follow-up with your orthopedic doctor tomorrow.  Please leave splint in 

Place until follow-up appointment.  Please continue to ice elevate the affected 

area.  Please return to the emergency room for any other concerns.


Is patient prescribed a controlled substance at d/c from ED?: No


Referrals: 


Ibrahima Wilkerson DO [Primary Care Provider] - 1-2 days


Jose C Frazier DO [Doctor of Osteopathic Medicine] - 1-2 days


Time of Disposition: 02:55

## 2018-05-22 ENCOUNTER — HOSPITAL ENCOUNTER (OUTPATIENT)
Dept: HOSPITAL 47 - RADXRMAIN | Age: 58
Discharge: HOME | End: 2018-05-22
Payer: COMMERCIAL

## 2018-05-22 DIAGNOSIS — M54.6: Primary | ICD-10-CM

## 2018-05-22 PROCEDURE — 72072 X-RAY EXAM THORAC SPINE 3VWS: CPT

## 2018-05-23 NOTE — XR
EXAMINATION TYPE: XR thoracic spine complete

 

DATE OF EXAM: 5/22/2018

 

CLINICAL HISTORY: pain

 

TECHNIQUE: Frontal, lateral, and swimmer's view of thoracic spine are obtained.  

 

COMPARISON: None.

 

FINDINGS: Thoracic spine show satisfactory alignment without evidence of acute fracture or dislocatio
n.  Vertebral body heights are preserved. Mild degenerative disc space narrowing noted with mild vent
ral spondylosis.   Visualized ribs are unremarkable.   

 

IMPRESSION: No acute fracture or dislocation is seen in the thoracic spine.  ICD 10 NO FRACTURE, INIT
IAL EVALUATION

## 2018-06-27 ENCOUNTER — HOSPITAL ENCOUNTER (OUTPATIENT)
Dept: HOSPITAL 47 - RADCTMAIN | Age: 58
Discharge: HOME | End: 2018-06-27
Payer: COMMERCIAL

## 2018-06-27 DIAGNOSIS — C83.33: Primary | ICD-10-CM

## 2018-06-27 DIAGNOSIS — K66.8: ICD-10-CM

## 2018-06-27 PROCEDURE — 74177 CT ABD & PELVIS W/CONTRAST: CPT

## 2018-06-27 PROCEDURE — 71260 CT THORAX DX C+: CPT

## 2018-06-27 NOTE — CT
EXAMINATION TYPE: CT ChestAbdPelvis w con

 

DATE OF EXAM: 6/27/2018

 

COMPARISON: February 7, 2018

 

HISTORY: Patient has no complaints at time of study.  Follow up study for known lymphoma.

 

CT DLP: 1523 mGycm

 

CONTRAST: 

CT scan of the chest, abdomen and pelvis is performed with Oral Contrast and with IV Contrast, patien
t injected with 100 mL of Isovue 300.

 

CT  Chest:

LUNGS: The lungs are clear and free of infiltrate or atelectasis.  No pulmonary nodule or mass is det
ected.  No pleural effusion or CT evidence of interstitial lung disease.

 

MEDIASTINUM:  Thoracic aorta is of normal caliber.  The heart is not enlarged.  No evidence for media
stinal mass or adenopathy.

 

HILAR STRUCTURES: No evidence for mass.  No hilar adenopathy is appreciated.

 

OTHER: No significant abnormality.

 

CONTRAST CT ABDOMEN AND PELVIS FINDINGS: 

 

LIVER/GB:   No calcified gallstones.  No space occupying hepatic lesion. Biliary tree is of normal ca
liber. 

 

PANCREAS:  No inflammation.  No distinct mass. 

 

SPLEEN: Splenomegaly with craniocaudal measurement of 13.6 cm. No lesion seen. 

 

ADRENALS:  No nodule.  No thickening. 

 

KIDNEYS/BLADDER:  No hydronephrosis.  No nephrolithiasis.  No distinct renal mass. 

 

BOWEL: Pigtail catheter is noted to upper mid abdomen postoperative changes of the stomach identified
. Gastric sleeve formation. New Lobulated mass within the small bowel mesentery measuring 5.4 x 4.4 c
m x 5.7 cm. Possible infiltration into the jejunum at the level of the ligament of Treitz. Contrast i
s noted beyond the mass however.

 

 

GENITAL ORGANS:  No gross abnormality. 

 

LYMPH NODES:  No greater than 1cm abdominal or pelvic lymph nodes are appreciated.

 

AORTA: No significant abnormality. 

 

OSSEOUS STRUCTURES:  No significant abnormality is seen. 

 

OTHER:  No significant additional abnormality is seen.  

 

IMPRESSION: 

1. New lobulated mesenteric mass with possible infiltration into the jejunum at the level of the liga
ment of Treitz. Contrast is noted beyond the mass however.

## 2018-07-06 ENCOUNTER — HOSPITAL ENCOUNTER (OUTPATIENT)
Dept: HOSPITAL 47 - RADXRMAIN | Age: 58
Discharge: HOME | End: 2018-07-06
Payer: COMMERCIAL

## 2018-07-06 DIAGNOSIS — D63.8: ICD-10-CM

## 2018-07-06 DIAGNOSIS — C83.33: Primary | ICD-10-CM

## 2018-07-06 LAB
ALBUMIN SERPL-MCNC: 3.5 G/DL (ref 3.5–5)
ALP SERPL-CCNC: 205 U/L (ref 38–126)
ALT SERPL-CCNC: 43 U/L (ref 21–72)
ANION GAP SERPL CALC-SCNC: 16 MMOL/L
AST SERPL-CCNC: 30 U/L (ref 17–59)
BUN SERPL-SCNC: 34 MG/DL (ref 9–20)
CALCIUM SPEC-MCNC: 8.9 MG/DL (ref 8.4–10.2)
CHLORIDE SERPL-SCNC: 100 MMOL/L (ref 98–107)
CO2 SERPL-SCNC: 23 MMOL/L (ref 22–30)
GLUCOSE SERPL-MCNC: 105 MG/DL (ref 74–99)
POTASSIUM SERPL-SCNC: 4.6 MMOL/L (ref 3.5–5.1)
PROT SERPL-MCNC: 6.1 G/DL (ref 6.3–8.2)
SODIUM SERPL-SCNC: 139 MMOL/L (ref 137–145)

## 2018-07-06 PROCEDURE — 74018 RADEX ABDOMEN 1 VIEW: CPT

## 2018-07-06 PROCEDURE — 80053 COMPREHEN METABOLIC PANEL: CPT

## 2018-07-06 NOTE — XR
Abdomen

 

HISTORY: Anemia, lymphoma

 

Frontal view of the abdomen correlated to prior exam 2/7/2018 and CT chest abdomen pelvis 6/27/2018

 

There is a PEG tube in place. Postop changes are noted to the bowel and stomach. No pneumoperitoneum 
or bowel obstruction. Vascular calcifications present within the pelvis. Lung bases are not included 
on the exam.

 

IMPRESSION: Nonobstructive bowel gas pattern. Postop changes.

## 2018-07-09 ENCOUNTER — HOSPITAL ENCOUNTER (INPATIENT)
Dept: HOSPITAL 47 - EC | Age: 58
LOS: 4 days | Discharge: HOME | DRG: 948 | End: 2018-07-13
Attending: HOSPITALIST | Admitting: HOSPITALIST
Payer: COMMERCIAL

## 2018-07-09 VITALS — BODY MASS INDEX: 22.3 KG/M2

## 2018-07-09 DIAGNOSIS — Z87.891: ICD-10-CM

## 2018-07-09 DIAGNOSIS — R78.81: ICD-10-CM

## 2018-07-09 DIAGNOSIS — C85.13: ICD-10-CM

## 2018-07-09 DIAGNOSIS — G89.3: Primary | ICD-10-CM

## 2018-07-09 DIAGNOSIS — Z98.84: ICD-10-CM

## 2018-07-09 DIAGNOSIS — B37.0: ICD-10-CM

## 2018-07-09 DIAGNOSIS — K21.0: ICD-10-CM

## 2018-07-09 DIAGNOSIS — K59.00: ICD-10-CM

## 2018-07-09 DIAGNOSIS — B96.89: ICD-10-CM

## 2018-07-09 DIAGNOSIS — Z90.3: ICD-10-CM

## 2018-07-09 DIAGNOSIS — D63.0: ICD-10-CM

## 2018-07-09 DIAGNOSIS — E44.0: ICD-10-CM

## 2018-07-09 DIAGNOSIS — Z92.21: ICD-10-CM

## 2018-07-09 DIAGNOSIS — E86.0: ICD-10-CM

## 2018-07-09 DIAGNOSIS — Z53.29: ICD-10-CM

## 2018-07-09 DIAGNOSIS — Z79.899: ICD-10-CM

## 2018-07-09 LAB
ALBUMIN SERPL-MCNC: 3.2 G/DL (ref 3.5–5)
ALP SERPL-CCNC: 151 U/L (ref 38–126)
ALT SERPL-CCNC: 34 U/L (ref 21–72)
AMYLASE SERPL-CCNC: <30 U/L (ref 30–110)
ANION GAP SERPL CALC-SCNC: 11 MMOL/L
AST SERPL-CCNC: 25 U/L (ref 17–59)
BASOPHILS # BLD AUTO: 0 K/UL (ref 0–0.2)
BASOPHILS NFR BLD AUTO: 0 %
BUN SERPL-SCNC: 30 MG/DL (ref 9–20)
CALCIUM SPEC-MCNC: 8.4 MG/DL (ref 8.4–10.2)
CHLORIDE SERPL-SCNC: 99 MMOL/L (ref 98–107)
CO2 SERPL-SCNC: 25 MMOL/L (ref 22–30)
EOSINOPHIL # BLD AUTO: 0.1 K/UL (ref 0–0.7)
EOSINOPHIL NFR BLD AUTO: 3 %
ERYTHROCYTE [DISTWIDTH] IN BLOOD BY AUTOMATED COUNT: 2.8 M/UL (ref 4.3–5.9)
ERYTHROCYTE [DISTWIDTH] IN BLOOD: 14 % (ref 11.5–15.5)
GLUCOSE BLD-MCNC: 82 MG/DL (ref 75–99)
GLUCOSE BLD-MCNC: 86 MG/DL (ref 75–99)
GLUCOSE SERPL-MCNC: 102 MG/DL (ref 74–99)
HBA1C MFR BLD: 5.4 % (ref 4–6)
HCT VFR BLD AUTO: 24.6 % (ref 39–53)
HGB BLD-MCNC: 8.2 GM/DL (ref 13–17.5)
LDH SPEC-CCNC: 540 U/L (ref 313–618)
LIPASE SERPL-CCNC: 39 U/L (ref 23–300)
LYMPHOCYTES # SPEC AUTO: 0.2 K/UL (ref 1–4.8)
LYMPHOCYTES NFR SPEC AUTO: 4 %
MCH RBC QN AUTO: 29.3 PG (ref 25–35)
MCHC RBC AUTO-ENTMCNC: 33.3 G/DL (ref 31–37)
MCV RBC AUTO: 88 FL (ref 80–100)
MONOCYTES # BLD AUTO: 0.3 K/UL (ref 0–1)
MONOCYTES NFR BLD AUTO: 7 %
NEUTROPHILS # BLD AUTO: 3.8 K/UL (ref 1.3–7.7)
NEUTROPHILS NFR BLD AUTO: 85 %
PH UR: 5.5 [PH] (ref 5–8)
PLATELET # BLD AUTO: 216 K/UL (ref 150–450)
POTASSIUM SERPL-SCNC: 4.4 MMOL/L (ref 3.5–5.1)
PROT SERPL-MCNC: 5.6 G/DL (ref 6.3–8.2)
SODIUM SERPL-SCNC: 135 MMOL/L (ref 137–145)
SP GR UR: 1.02 (ref 1–1.03)
URATE SERPL-MCNC: 1.5 MG/DL (ref 3.5–8.5)
UROBILINOGEN UR QL STRIP: <2 MG/DL (ref ?–2)
WBC # BLD AUTO: 4.5 K/UL (ref 3.8–10.6)

## 2018-07-09 PROCEDURE — 87186 SC STD MICRODIL/AGAR DIL: CPT

## 2018-07-09 PROCEDURE — 80053 COMPREHEN METABOLIC PANEL: CPT

## 2018-07-09 PROCEDURE — 82607 VITAMIN B-12: CPT

## 2018-07-09 PROCEDURE — 84100 ASSAY OF PHOSPHORUS: CPT

## 2018-07-09 PROCEDURE — 81003 URINALYSIS AUTO W/O SCOPE: CPT

## 2018-07-09 PROCEDURE — 83615 LACTATE (LD) (LDH) ENZYME: CPT

## 2018-07-09 PROCEDURE — 87040 BLOOD CULTURE FOR BACTERIA: CPT

## 2018-07-09 PROCEDURE — 96374 THER/PROPH/DIAG INJ IV PUSH: CPT

## 2018-07-09 PROCEDURE — 83605 ASSAY OF LACTIC ACID: CPT

## 2018-07-09 PROCEDURE — 99285 EMERGENCY DEPT VISIT HI MDM: CPT

## 2018-07-09 PROCEDURE — 93005 ELECTROCARDIOGRAM TRACING: CPT

## 2018-07-09 PROCEDURE — 83540 ASSAY OF IRON: CPT

## 2018-07-09 PROCEDURE — 82330 ASSAY OF CALCIUM: CPT

## 2018-07-09 PROCEDURE — 82150 ASSAY OF AMYLASE: CPT

## 2018-07-09 PROCEDURE — 83550 IRON BINDING TEST: CPT

## 2018-07-09 PROCEDURE — 96375 TX/PRO/DX INJ NEW DRUG ADDON: CPT

## 2018-07-09 PROCEDURE — 36415 COLL VENOUS BLD VENIPUNCTURE: CPT

## 2018-07-09 PROCEDURE — 80202 ASSAY OF VANCOMYCIN: CPT

## 2018-07-09 PROCEDURE — 83690 ASSAY OF LIPASE: CPT

## 2018-07-09 PROCEDURE — 96376 TX/PRO/DX INJ SAME DRUG ADON: CPT

## 2018-07-09 PROCEDURE — 82728 ASSAY OF FERRITIN: CPT

## 2018-07-09 PROCEDURE — 87086 URINE CULTURE/COLONY COUNT: CPT

## 2018-07-09 PROCEDURE — 84484 ASSAY OF TROPONIN QUANT: CPT

## 2018-07-09 PROCEDURE — 84550 ASSAY OF BLOOD/URIC ACID: CPT

## 2018-07-09 PROCEDURE — 87077 CULTURE AEROBIC IDENTIFY: CPT

## 2018-07-09 PROCEDURE — 83735 ASSAY OF MAGNESIUM: CPT

## 2018-07-09 PROCEDURE — 82040 ASSAY OF SERUM ALBUMIN: CPT

## 2018-07-09 PROCEDURE — 80048 BASIC METABOLIC PNL TOTAL CA: CPT

## 2018-07-09 PROCEDURE — 85025 COMPLETE CBC W/AUTO DIFF WBC: CPT

## 2018-07-09 PROCEDURE — 83036 HEMOGLOBIN GLYCOSYLATED A1C: CPT

## 2018-07-09 PROCEDURE — 85045 AUTOMATED RETICULOCYTE COUNT: CPT

## 2018-07-09 PROCEDURE — 84478 ASSAY OF TRIGLYCERIDES: CPT

## 2018-07-09 PROCEDURE — 96361 HYDRATE IV INFUSION ADD-ON: CPT

## 2018-07-09 PROCEDURE — 74177 CT ABD & PELVIS W/CONTRAST: CPT

## 2018-07-09 PROCEDURE — 83921 ORGANIC ACID SINGLE QUANT: CPT

## 2018-07-09 RX ADMIN — PROCHLORPERAZINE MALEATE PRN MG: 10 TABLET, FILM COATED ORAL at 21:04

## 2018-07-09 RX ADMIN — INSULIN ASPART SCH: 100 INJECTION, SOLUTION INTRAVENOUS; SUBCUTANEOUS at 13:33

## 2018-07-09 RX ADMIN — CEFAZOLIN SCH MLS/HR: 330 INJECTION, POWDER, FOR SOLUTION INTRAMUSCULAR; INTRAVENOUS at 14:11

## 2018-07-09 RX ADMIN — HYDROMORPHONE HYDROCHLORIDE PRN MG: 1 INJECTION, SOLUTION INTRAMUSCULAR; INTRAVENOUS; SUBCUTANEOUS at 14:10

## 2018-07-09 RX ADMIN — INSULIN ASPART SCH: 100 INJECTION, SOLUTION INTRAVENOUS; SUBCUTANEOUS at 20:30

## 2018-07-09 RX ADMIN — CEFAZOLIN SCH MLS/HR: 330 INJECTION, POWDER, FOR SOLUTION INTRAMUSCULAR; INTRAVENOUS at 22:36

## 2018-07-09 RX ADMIN — HYDROMORPHONE HYDROCHLORIDE PRN MG: 1 INJECTION, SOLUTION INTRAMUSCULAR; INTRAVENOUS; SUBCUTANEOUS at 17:12

## 2018-07-09 RX ADMIN — INSULIN ASPART SCH: 100 INJECTION, SOLUTION INTRAVENOUS; SUBCUTANEOUS at 17:08

## 2018-07-09 RX ADMIN — ONDANSETRON PRN MG: 2 INJECTION INTRAMUSCULAR; INTRAVENOUS at 17:08

## 2018-07-09 NOTE — ED
Abdominal Pain HPI





- General


Chief Complaint: Abdominal Pain


Stated Complaint: Abd Pain


Time Seen by Provider: 18 08:21


Source: patient, RN notes reviewed, old records reviewed


Mode of arrival: ambulatory


Limitations: no limitations





- History of Present Illness


Initial Comments: 





This patient's a 58-year-old male with history of non-Hodgkin's lymphoma with a 

history of partial gastrectomy and biliary stent placement presents emergency 

department today chief complaint of mid back pain and intermittent abdominal 

pain.  He reports that he had an x-ray done by his oncologist Dr. Pardo on 

Friday.  Patient's mother told that if he did not have any stools to do 

suppository.  Family reports he did a suppository yesterday evening with some 

mild stool output.  They report that he has been having some vomiting.  He is 

unable to sleep well.  He's been taking by mouth morphine and Ativan for pain.  

Patient states that he has had no falls or trauma.  He states that he's had no 

bloody emesis or black tarry stools.





- Related Data


 Home Medications











 Medication  Instructions  Recorded  Confirmed


 


Ergocalciferol [Vitamin D2 50,000 unit PO WE 01/15/18 07/09/18





(DRISDOL)]   


 


Insulin Lispro [humaLOG Kwikpen] See Protocol SQ ACHS 18


 


LORazepam [Ativan] 1 mg PO Q8H PRN 18


 


Morphine Sulfate ER [Ms Contin] 30 mg PO DAILY 18


 


Omeprazole [PriLOSEC] 20 mg PO DAILY 18


 


Ondansetron HCl [Zofran] 4 mg PO TID PRN 18


 


Prochlorperazine [Compazine] 10 mg PO Q6H PRN 18


 


Sennosides [Senna] 8.6 mg PO DAILY 18


 


Sertraline [Zoloft] 100 mg PO DAILY 18


 


fentaNYL 25MCG/HR PATCH [Duragesic 1 patch TRANSDERM Q72H 18





25MCG/HR]   











 Allergies











Allergy/AdvReac Type Severity Reaction Status Date / Time


 


medication for TB exposure Allergy  Unknown Uncoded 18 08:19














Review of Systems


ROS Statement: 


Those systems with pertinent positive or pertinent negative responses have been 

documented in the HPI.





ROS Other: All systems not noted in ROS Statement are negative.





Past Medical History


Past Medical History: Cancer


Additional Past Medical History / Comment(s): anemia, hypotension, lymphoma 

stage 2 non-hodgkins,  PICC lines RUE TPN, 18,


History of Any Multi-Drug Resistant Organisms: None Reported


Past Surgical History: Appendectomy, Orthopedic Surgery


Additional Past Surgical History / Comment(s): torn acl and several scopes of 

bilat knees, partial gastrectomy A COUPLE MONTHS AGO due to perforated ulcer. G 

tube,


Past Anesthesia/Blood Transfusion Reactions: No Reported Reaction


Past Psychological History: No Psychological Hx Reported


Smoking Status: Former smoker


Past Alcohol Use History: None Reported


Past Drug Use History: None Reported





- Past Family History


  ** Father


Family Medical History: Pneumonia


Additional Family Medical History / Comment(s): Father had anemia.  He  at 

the age of 79yrs from complications after a ingrown toenail was removed and 

then became infected and had to have amputation.





  ** Mother


Family Medical History: Dementia


Additional Family Medical History / Comment(s): Mother  of dementia at the 

age of 82 yrs.





General Exam





- General Exam Comments


Initial Comments: 





58-year-old male.  Alert and oriented.  No significant distress.


Limitations: no limitations


General appearance: alert, in no apparent distress


Head exam: Present: atraumatic, normocephalic, normal inspection


Eye exam: Present: normal appearance, PERRL, EOMI.  Absent: scleral icterus, 

conjunctival injection, periorbital swelling


ENT exam: Present: normal exam, mucous membranes moist


Neck exam: Present: normal inspection.  Absent: tenderness, meningismus, 

lymphadenopathy


Cardiovascular Exam: Present: regular rate, normal rhythm, normal heart sounds.

  Absent: systolic murmur, diastolic murmur, rubs, gallop, clicks


GI/Abdominal exam: Present: normal bowel sounds, other.  Absent: soft, distended

, tenderness (Firm. Evidence of biliary drain), guarding, rebound, rigid


Extremities exam: Present: normal inspection, full ROM, normal capillary 

refill.  Absent: tenderness, pedal edema, joint swelling, calf tenderness


Back exam: Present: normal inspection


Neurological exam: Present: alert, oriented X3, CN II-XII intact





Course


 Vital Signs











  18





  08:13 08:37 10:31


 


Temperature 98.3 F  


 


Pulse Rate 126 H 115 H 111 H


 


Respiratory 18 18 18





Rate   


 


Blood Pressure 118/67  115/69


 


O2 Sat by Pulse 100 100 99





Oximetry   














Medical Decision Making





- Medical Decision Making





Patient is a 58-year-old male with a history of lymphoma presents emergency 

department today with back pain and able to have bowel movements for the past 

few days.  He also complains of some episodes of vomiting.  Does have a history 

of partial gastrectomy swells biliary stent placement.  His initial surgery was 

by Dr. Baker.  Patient reports that his pain radiates from his back towards 

his abdomen.  He has no tenderness on abdominal exam.  Laboratory data show 

hemoglobin of 8.2.  White blood cell count 4.5.  Her RBC is 2.8.  Hemoglobin is 

decreased two units from last admission. we did complete a CT of abdomen and 

pelvis.  There is evidence of gastric distention as well as multiple areas 

concerning for neoplasm or worsening neoplasm.  His oncologist is Dr. Pardo.  

He has no active vomiting at this time.  This time and that the Patient for IV 

fluids, pain management consults to his oncologist Dr. stratton as well as the 

surgeon Dr. Baker.  We'll hold off on doing an NG tube at this time.  I 

will give the Patient nothing by mouth.





- Lab Data


Result diagrams: 


 18 09:00





 18 09:00


 Lab Results











  18 Range/Units





  09:00 09:00 09:00 


 


WBC   4.5   (3.8-10.6)  k/uL


 


RBC   2.80 L   (4.30-5.90)  m/uL


 


Hgb   8.2 L   (13.0-17.5)  gm/dL


 


Hct   24.6 L   (39.0-53.0)  %


 


MCV   88.0   (80.0-100.0)  fL


 


MCH   29.3   (25.0-35.0)  pg


 


MCHC   33.3   (31.0-37.0)  g/dL


 


RDW   14.0   (11.5-15.5)  %


 


Plt Count   216   (150-450)  k/uL


 


Neutrophils %   85   %


 


Lymphocytes %   4   %


 


Monocytes %   7   %


 


Eosinophils %   3   %


 


Basophils %   0   %


 


Neutrophils #   3.8   (1.3-7.7)  k/uL


 


Lymphocytes #   0.2 L   (1.0-4.8)  k/uL


 


Monocytes #   0.3   (0-1.0)  k/uL


 


Eosinophils #   0.1   (0-0.7)  k/uL


 


Basophils #   0.0   (0-0.2)  k/uL


 


Sodium  135 L    (137-145)  mmol/L


 


Potassium  4.4    (3.5-5.1)  mmol/L


 


Chloride  99    ()  mmol/L


 


Carbon Dioxide  25    (22-30)  mmol/L


 


Anion Gap  11    mmol/L


 


BUN  30 H    (9-20)  mg/dL


 


Creatinine  0.35 L    (0.66-1.25)  mg/dL


 


Est GFR (CKD-EPI)AfAm  >90    (>60 ml/min/1.73 sqM)  


 


Est GFR (CKD-EPI)NonAf  >90    (>60 ml/min/1.73 sqM)  


 


Glucose  102 H    (74-99)  mg/dL


 


Plasma Lactic Acid Len    0.5 L  (0.7-2.0)  mmol/L


 


Calcium  8.4    (8.4-10.2)  mg/dL


 


Total Bilirubin  1.0    (0.2-1.3)  mg/dL


 


AST  25    (17-59)  U/L


 


ALT  34    (21-72)  U/L


 


Alkaline Phosphatase  151 H    ()  U/L


 


Total Protein  5.6 L    (6.3-8.2)  g/dL


 


Albumin  3.2 L    (3.5-5.0)  g/dL


 


Amylase  <30 L    ()  U/L


 


Lipase  39    ()  U/L


 


Urine Color     


 


Urine Appearance     (Clear)  


 


Urine pH     (5.0-8.0)  


 


Ur Specific Gravity     (1.001-1.035)  


 


Urine Protein     (Negative)  


 


Urine Glucose (UA)     (Negative)  


 


Urine Ketones     (Negative)  


 


Urine Blood     (Negative)  


 


Urine Nitrite     (Negative)  


 


Urine Bilirubin     (Negative)  


 


Urine Urobilinogen     (<2.0)  mg/dL


 


Ur Leukocyte Esterase     (Negative)  














  18 Range/Units





  09:26 


 


WBC   (3.8-10.6)  k/uL


 


RBC   (4.30-5.90)  m/uL


 


Hgb   (13.0-17.5)  gm/dL


 


Hct   (39.0-53.0)  %


 


MCV   (80.0-100.0)  fL


 


MCH   (25.0-35.0)  pg


 


MCHC   (31.0-37.0)  g/dL


 


RDW   (11.5-15.5)  %


 


Plt Count   (150-450)  k/uL


 


Neutrophils %   %


 


Lymphocytes %   %


 


Monocytes %   %


 


Eosinophils %   %


 


Basophils %   %


 


Neutrophils #   (1.3-7.7)  k/uL


 


Lymphocytes #   (1.0-4.8)  k/uL


 


Monocytes #   (0-1.0)  k/uL


 


Eosinophils #   (0-0.7)  k/uL


 


Basophils #   (0-0.2)  k/uL


 


Sodium   (137-145)  mmol/L


 


Potassium   (3.5-5.1)  mmol/L


 


Chloride   ()  mmol/L


 


Carbon Dioxide   (22-30)  mmol/L


 


Anion Gap   mmol/L


 


BUN   (9-20)  mg/dL


 


Creatinine   (0.66-1.25)  mg/dL


 


Est GFR (CKD-EPI)AfAm   (>60 ml/min/1.73 sqM)  


 


Est GFR (CKD-EPI)NonAf   (>60 ml/min/1.73 sqM)  


 


Glucose   (74-99)  mg/dL


 


Plasma Lactic Acid Len   (0.7-2.0)  mmol/L


 


Calcium   (8.4-10.2)  mg/dL


 


Total Bilirubin   (0.2-1.3)  mg/dL


 


AST   (17-59)  U/L


 


ALT   (21-72)  U/L


 


Alkaline Phosphatase   ()  U/L


 


Total Protein   (6.3-8.2)  g/dL


 


Albumin   (3.5-5.0)  g/dL


 


Amylase   ()  U/L


 


Lipase   ()  U/L


 


Urine Color  Yellow  


 


Urine Appearance  Clear  (Clear)  


 


Urine pH  5.5  (5.0-8.0)  


 


Ur Specific Gravity  1.022  (1.001-1.035)  


 


Urine Protein  Negative  (Negative)  


 


Urine Glucose (UA)  Negative  (Negative)  


 


Urine Ketones  Negative  (Negative)  


 


Urine Blood  Negative  (Negative)  


 


Urine Nitrite  Negative  (Negative)  


 


Urine Bilirubin  Negative  (Negative)  


 


Urine Urobilinogen  <2.0  (<2.0)  mg/dL


 


Ur Leukocyte Esterase  Negative  (Negative)  














18 10:04


EKG performed at 953 shows sinus tachycardia otherwise normal EKG.  Ventricular 

rate of 113 beats were minute.  MO interval is 132 ms.  QRS duration 60 ms.  QT 

QTc is 322/441 ms.  No evidence of ST elevation or T-wave inversion.





- Radiology Data


Radiology results: report reviewed


Yisel-en-Y gastric bypass with prominent fluid distention of the gastric pouch 

as well as the excluded portion of the stomach.  While doing a mass along the 

fourth portion of the duodenum appears minimally larger at 6.1 cm and there is 

increasing vague soft tissue density extending superiorly.  This is a mass 

effect causing slitlike narrowing of the left renal vein.  Progressive neoplasm 

or lymphoma suggested and this may be contaminated upper GI obstruction.  

Additional spiculated mesenteric to tear left abdomen is adjacent stable and 

also suspicious for neoplasm.  Scattered retroperitoneal area.  She'll lymph 

nodes show increased size from 05/15/2018.  For example 1.3 aorto clavicle 

lymph node is new from 05/15/2018.  No splenomegaly at 16.1 cm.  There is also 

circumferential wall thickening of the distal sigmoid colon and rectum which 

could be secondary to nondistention or mild colitis.





Disposition


Clinical Impression: 


 History of Yisel-en-Y gastric bypass, Lymphoma, Back pain, Gastric outflow 

obstruction





Disposition: ADMITTED AS IP TO THIS HOSP


Condition: Stable


Is patient prescribed a controlled substance at d/c from ED?: No


When asked, does pt state using other controlled substances?: No


If prescribed controlled substance>3 days was MAPS reviewed?: No


If opioid is for acute pain is fill amount 7 days or less?: No


If Rx opioid, was Start Talking consent form obtained?: No


Referrals: 


Ibrahima Wilkerson DO [Primary Care Provider] - 1-2 days


Time of Disposition: 11:46

## 2018-07-09 NOTE — HP
HISTORY AND PHYSICAL



DATE OF ADMISSION:

7/9/18.



DATE OF SERVICE:

7/9/18.



PRESENT COMPLAINT:

Abdominal pain.



HISTORY OF PRESENT COMPLAINT:

Pleasant 58-year-old patient of Dr. Ibrahima Wilkerson.  The patient also following with

Dr. Pardo.  Close to a year ago patient presented with abdominal pain, and then

underwent a partial gastrectomy and Yisel-en-Y procedure by Dr. Vega.  The patient

was then diagnosed to have high-grade B-cell lymphoma.  Did receive 4 cycles of R-CHOP.

The patient subsequently had bowel perforation and was at [facility]. There

the patient was treated with antibiotics and patient is not felt to be a candidate for

any surgical intervention or other further treatment. Because patient has been losing

weight, patient has been getting nocturnal TPN.  The patient presents with 2 days of

increasing abdominal pain, some nausea, vomiting.  No fever.  The patient normally does

have a bowel movement.  CT scan results are noted.  Findings are somewhat complex,

given the history prior surgeries and intervention.  It is unclear if the lymphoma has

recurred or what is exact status of the same.  The patient's wife and sister are at the

bedside.  Consultation was made both to Dr. Vega and Dr. Pardo.



REVIEW OF SYSTEMS:

CONSTITUTIONAL: Tired, weight loss.

HEENT: None.

RESPIRATORY:  None.

CARDIOVASCULAR: None.

GASTROINTESTINAL: As above.

GENITOURINARY: None.

MUSCULOSKELETAL: None.

DERMATOLOGIC, HEMATOLOGIC, LYMPHATIC: None.

PSYCHIATRY: Some anxiety.

NEUROLOGICAL: None.



PAST MEDICAL HISTORY:

Non-Hodgkin's lymphoma.



PAST SURGICAL HISTORY:

Appendectomy, partial gastrectomy with lysis of adhesions, reduction of right inguinal

hernia, gastroenteric fistula and gastrojejunostomy with reconstruction, ACL bilateral

repair, several bilateral knee arthroscopies.



SOCIAL HISTORY:

, retired from post office, has a PICC line for TPN infusions. Stopped smoking

in 2017.



FAMILY HISTORY:

Father had complications of ingrowing toenail.



HOME MEDICATIONS:

1. Fentanyl 25 mcg patch every 72 hours.

2. Zoloft 100 mg p.o. daily.

3. Senna 8.6 p.o. daily.

4. Compazine 10 mg p.o. q.6h p.r.n.

5. Zofran 4 mg p.o. t.i.d. p.r.n.

6. Prilosec 20 mg p.o. daily.

7. MS Contin 30 mg p.o. daily.

8. Ativan 1 mg q.8 p.r.n.

9. Vitamin D2 74953 units p.o. on Wednesdays.



ALLERGIES:

ALLERGY TO TB EXPOSURE.



PHYSICAL EXAMINATION:

Vital signs on presentation, temperature 98.3, pulse 126, respiration 18, blood

pressure 118/67, pulse ox 100% on room air.

GENERAL APPEARANCE: Average built, sitting up, a bit anxious-appearing.

EYES:  Pupils equal.  Conjunctivae pale.

HEENT: External appearance of nose and ears normal. Oral cavity a bit dry.

NECK: JVD not raised.  Mass not palpable.

RESPIRATORY: Effort normal. Lungs, slightly decreased breath sounds.

CARDIOVASCULAR: First and second sounds, no edema.

ABDOMEN:  Soft.  Has got a biliary drain in place with the bag attached to the same.

There is more central tenderness. No guarding or rigidity. Liver and spleen not

palpable.

LYMPHATICS: No lymph node palpable in neck or axillae.

PSYCHIATRY:  Alert and oriented x3.  Mood and affect slightly anxious-appearing.

NEUROLOGICAL:  Pupils equal. Cranial nerves grossly intact. Power and sensation grossly

intact.



INVESTIGATIONS:

White count 4.5, hemoglobin 8.2, potassium 4.4, BUN 30, creatinine 0.35.  UA negative.

CT scan of the abdomen showing Yisel-en-Y gastric bypass with prominent fluid distention

of the gastric pouch. Lobulated mass along the 4th portion of the duodenum, minimally

large to 6.1 cm and some increasing vague soft tissue density superior to that.

Additional spiculated mesenteric density left abdomen.  Scattered retroperitoneal lymph

nodes.



ASSESSMENT:

1. Acute abdomen in a patient, rather complex excessive history including surgical

    procedures as above in the setting of non-Hodgkin's lymphoma, further complicated

    by perforation of the bowels.  No further treatment since then.

2. Non-Hodgkin's lymphoma status post 4 cycles of R-CHOP.  It is unclear if the

    lymphoma has progressed.

3. Normocytic anemia from underlying lymphoma.

4. Clinical dehydration with elevated BUN.

5. Mild protein-calorie malnutrition with albumin down to 3.2.



PLAN:

Home medications are continued.  Consultation was made with Dr. Pardo. It seemed that

further workup will be done as an outpatient.  General surgery Dr. Vega was

consulted.  Await their input.  There is no white count or fever.  Will hold off any

antibiotics for now.  The patient's TPN will be resumed.





MMODL / IJN: [id number] / Job#: [id number]

## 2018-07-09 NOTE — CT
EXAMINATION TYPE: CT abdomen pelvis w con

 

DATE OF EXAM: 7/9/2018

 

COMPARISON: 6/27/2018

 

HISTORY: 58-year-old male with Abdominal pain

 

TECHNIQUE: Contiguous axial scanning of the abdomen and pelvis following administration of 100 ml Iso
ernie 300 IV contrast.  Delayed images through the kidneys and coronal/sagittal reconstructions perform
ed.

 

CT DLP: 629.40 mGycm

Automated exposure control for dose reduction was used.

 

 

FINDINGS:

Heart normal size without pericardial effusion. Strandy atelectasis at the lung bases without pleural
 effusion. Some focal patchy peripheral left basilar density is unchanged and could represent chronic
 volume loss and scarring.

 

No focal liver lesion or biliary ductal dilatation. Portal venous system is patent.

 

Gallbladder, adrenal glands, kidneys, and pancreas show no gross abnormality.

 

Spleen enlarged measuring 16.1 cm on coronal series.

 

An anterior epigastric pigtail catheter remains in place.

 

Postsurgical changes of Yisel-en-Y gastric bypass. There is fluid distention of both the gastric pouch
 and the excluded portion of the stomach.

 

Redemonstrated lobulated mass involving the fourth portion of the duodenum at the level of the ligame
nt of [place]. This currently measures up to 6.1 cm versus 5.9 cm on 6/27/2018.

 

There is some increasing vague soft tissue density extending superiorly from this area along the retr
operitoneum that has some anterior mass effect on to the left renal vein now causing slitlike narrowi
ng.

 

Persistent spiculated soft tissue in the anterior left mesentery just adjacent measuring 3.0 cm, axia
l image 28.

 

There is no bowel dilatation seen beyond the gastrojejunostomy.

 

Prominent left periaortic lymph nodes measure up to 8 mm, increased from 2/7/2018.  1.3 cm aortocaval
 lymph node axial image 29, from 5/15/2018.

 

Moderate stool within the right side of the colon. No pericolonic inflammatory change.  There may be 
some mild circumferential wall thickening of the distal sigmoid and rectum. 

 

Bladder air distended. Mild circumferential bladder wall thickening. Some central prostatic calcifica
tions. No abnormal fluid collection in the pelvis or pelvic lymphadenopathy seen.

 

Bones: no osseous destructive process.

 

 

 

IMPRESSION: 

 

1. YISEL-EN-Y GASTRIC BYPASS WITH PROMINENT FLUID DISTENTION OF THE GASTRIC POUCH AS WELL AS THE EXCLU
DED PORTION OF THE STOMACH. 

2. LOBULATED MASS ALONG THE FOURTH PORTION OF THE DUODENUM APPEARS MINIMALLY LARGER AT 6.1 CM AND THE
RE IS INCREASING VAGUE SOFT TISSUE DENSITY EXTENDING SUPERIORLY. THIS HAS MASS EFFECT CAUSING NEW SLI
TLIKE NARROWING OF THE LEFT RENAL VEIN. PROGRESSIVE NEOPLASM/LYMPHOMA IS SUGGESTED AND THIS MAY BE CO
NTRIBUTING TO AN UPPER GI OBSTRUCTION.

3. ADDITIONAL SPICULATED MESENTERIC DENSITY ANTERIOR LEFT ABDOMEN JUST ADJACENT IS STABLE AND ALSO FOREMAN
SPICIOUS FOR NEOPLASM.

4. SCATTERED RETROPERITONEAL LYMPH NODES SHOW INCREASING SIZE FROM 5/15/2018. FOR EXAMPLE, A 1.3 CM A
ORTOCAVAL LYMPH NODE IS NEW FROM 5/15/2018. THERE IS ALSO SPLENOMEGALY AT 16.1 CM.

5. MILD CIRCUMFERENTIAL WALL THICKENING OF THE DISTAL SIGMOID AND RECTUM COULD BE SECONDARY TO NONDIS
TENTION OR MILD COLITIS.

## 2018-07-09 NOTE — P.CONS
History of Present Illness





- Reason for Consult


Consult date: 18


Lymphoma History


Requesting physician: Isi Adams





- Chief Complaint


Pain





- History of Present Illness





This a very nice patient who presented in May,2017 with abdominal pain and 

anemia,it was mild abdominal pain,off/on,he had colonoscopy on 2017 which 

revealed benign sigmoid poly.


He had a CT scan of abdomen/pelvis on 2017 which reported irregular thick 

fluid collection in upper abdomen suspicious for abscess (upon futher review,

there was mensterci adenopathies noted).


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


On 2017,he had an EGD ,biopsies taken revealed acute inflammation.


On 2017,he underwent partial gastrectomy,pathology from gastric specimen 

was benign.


He continued to have abdominal pain and anemia,had repeat CT scan of abdomen on 

2017 which revealed extensive adenopathies in the mesentery,encasing 

vasculature,measuring up to 3.9x7.3x3.7cm,which progressed compared to prior CT 

scan.


On 2017,CT scan of chest revealed non specific adenopathies.





Shortly after I saw him for the first time on 2017,I did discuss his care 

with Dr Carlton but she opted not to proceed with left inguinal node biopsy,

that weekend,he ended up in ER with worsening abdominal pain and obstruction,he 

was transferred to [facility] had biliary drain tube placement.


On 2017,he had a biopsy of abdominal mass which was positive for high 

grade B cell lymphoma,RICO negative,BCL6 positive,Non germinal center,FISH for 

MYC and BCL6 were negative but positive for BCL2.


Bone marrow biopsy done on 2017 was negative and he received first cycle 

of RCHOP at [facility] as inpatient.


Repeat CT scan on 2018 ,revealed significant improvement in his lymphoma,

persistent fluid collection.


He had total of 4 cycles of RCHOP,last one on 2018


He ended up re admitted to [facility] after cycle#4 with bowel perforation,infection,

had along hospital stay for about 6 weeks and discharged for rehab.





Rock was seen in office of Friday with increasing pain, nausea and vomiting. 

He was unable to eat because he continued to feel nauseated. He did state he 

was having bowel movements at that time. He was clinically appearing 

dehydrated. IV Hydration and antiemetics were given and abdominal flat plate 

ordered. Flat plate failed to show concern for obstruction. He was feeling 

better and discharged home from the office. 





18 - He presented to [facility] Emergency Department with complaints of the 

pain in his abdomen persisting and worsening. CT scan of Abdomen and Pelvis was 

compared to previous diagnostic imaging at [facility]. This may present recurrence, 

difficult to tell secondary to patients multiple surgeries and complications in 

this area. He is not a surgical candidate through [facility]. 











Review of Systems





A 14 point review of systems assessed and completed and all negative except HPI





Past Medical History


Past Medical History: Cancer


Additional Past Medical History / Comment(s): 17 abdominal pain/anemia 

with surgical intervention, 10/2017 lymphoma diagnosed/abdominal masses at [facility] 

and pt started chemo there-1 session then 3 more sessions thru [facility]/Dr. Pardo, chemo caused severe abdominal pain and pt was diagnosed with perforated 

intestines d/t abdominal masses shrinkage/pulling on intestine-[facility] stated pt 

not surgical candidate and placed on antibiotics/long complicated 

hospitalization per spouse.


History of Any Multi-Drug Resistant Organisms: None Reported


Past Surgical History: Appendectomy, Orthopedic Surgery


Additional Past Surgical History / Comment(s): 17 laparoscopic partial 

gastrectomy with lysis of adhesions, reduction R inguinal hernia, resection 

gastroenteric fistula, gastrojujunostomy with reconstruction, EGDs/colonoscopy, 

ACL bilateral repairs, several bilateral knee arthroscopies, PICC line inserted 

17.


Past Anesthesia/Blood Transfusion Reactions: No Reported Reaction


Additional Past Anesthesia/Blood Transfusion Reaction / Comm: Pt has received 

blood without reaction.


Smoking Status: Former smoker





- Past Family History


  ** Father


Family Medical History: Pneumonia


Additional Family Medical History / Comment(s): Father had anemia.  He  at 

the age of 79yrs from complications after a ingrown toenail was removed and 

then became infected and had to have amputation.





  ** Mother


Family Medical History: Dementia


Additional Family Medical History / Comment(s): Mother  of dementia at the 

age of 82 yrs.





Medications and Allergies


 Home Medications











 Medication  Instructions  Recorded  Confirmed  Type


 


Ergocalciferol [Vitamin D2 50,000 unit PO WE 01/15/18 07/09/18 History





(DRISDOL)]    


 


Insulin Lispro [humaLOG Kwikpen] See Protocol SQ ACHS 18 History


 


LORazepam [Ativan] 1 mg PO Q8H PRN 18 History


 


Morphine Sulfate ER [Ms Contin] 30 mg PO DAILY 18 History


 


Omeprazole [PriLOSEC] 20 mg PO DAILY 18 History


 


Ondansetron HCl [Zofran] 4 mg PO TID PRN 18 History


 


Prochlorperazine [Compazine] 10 mg PO Q6H PRN 18 History


 


Sennosides [Senna] 8.6 mg PO DAILY 18 History


 


Sertraline [Zoloft] 100 mg PO DAILY 18 History


 


fentaNYL 25MCG/HR PATCH [Duragesic 1 patch TRANSDERM Q72H 18 

History





25MCG/HR]    











 Allergies











Allergy/AdvReac Type Severity Reaction Status Date / Time


 


medication for TB exposure Allergy  Unknown Uncoded 18 08:19














Physical Exam


Vitals: 


 Vital Signs











  Temp Pulse Pulse Resp BP BP Pulse Ox


 


 18 14:15  98.3 F   105 H  18   121/68  98


 


 18 12:06  97.6 F  114 H   18  155/64   98


 


 18 10:31   111 H   18  115/69   99


 


 18 08:37   115 H   18    100


 


 18 08:13  98.3 F  126 H   18  118/67   100








 Intake and Output











 18





 06:59 14:59 22:59


 


Intake Total  1119 


 


Output Total  250 


 


Balance  869 


 


Intake:   


 


  Intake, IV Titration  1119 





  Amount   


 


    Sodium Chloride 0.9% 1,  120 





    000 ml @ 120 mls/hr IV .   





    Q8H20M CATALINA Rx#:[id number]   


 


    Sodium Chloride 0.9% 1,  999 





    000 ml @ 999 mls/hr IV .   





    Q1H1M STA Rx#:[id number]   


 


Output:   


 


  Urine  250 


 


Other:   


 


  Voiding Method  Toilet 


 


  Weight  65.771 kg 














- Constitutional


General appearance: average body habitus, no acute distress





- EENT


Eyes: EOMI, PERRLA, dentition normal


ENT: NA/AT, normal oropharynx





- Neck





Supple, Trachea midline


Neck: normal ROM





- Respiratory


Respiratory: bilateral: CTA (No increased efort)





- Cardiovascular


Rhythm: regular


Heart sounds: normal: S1, S2





- Gastrointestinal


General gastrointestinal: distended, soft, splenomegaly, tenderness





- Integumentary


Integumentary: pale





- Neurologic





No focal Defects


Neurologic: CNII-XII intact





- Musculoskeletal


Musculoskeletal: generalized weakness, strength equal bilaterally





- Psychiatric


Psychiatric: A&O x's 3, appropriate affect, intact judgment & insight





Results


CBC & Chem 7: 


 18 09:00





 18 09:00


Labs: 


 Abnormal Lab Results - Last 24 Hours (Table)











  18 Range/Units





  09:00 09:00 09:00 


 


RBC   2.80 L   (4.30-5.90)  m/uL


 


Hgb   8.2 L   (13.0-17.5)  gm/dL


 


Hct   24.6 L   (39.0-53.0)  %


 


Lymphocytes #   0.2 L   (1.0-4.8)  k/uL


 


Sodium  135 L    (137-145)  mmol/L


 


BUN  30 H    (9-20)  mg/dL


 


Creatinine  0.35 L    (0.66-1.25)  mg/dL


 


Glucose  102 H    (74-99)  mg/dL


 


Plasma Lactic Acid Len    0.5 L  (0.7-2.0)  mmol/L


 


Alkaline Phosphatase  151 H    ()  U/L


 


Total Protein  5.6 L    (6.3-8.2)  g/dL


 


Albumin  3.2 L    (3.5-5.0)  g/dL


 


Amylase  <30 L    ()  U/L








 Microbiology - Last 24 Hours (Table)











 18 09:00 Blood Culture - Final





 Blood 


 


 18 09:26 Urine Culture - Preliminary





 Urine,Clean Catch 











CT scan - abdomen: report reviewed


CT scan - chest: report reviewed





Assessment and Plan


Plan: 





Assessment and Recommendations:





1. High Grade B Cell Lymphoma - Status Post 4 cycles of R-CHOP 


 - After 4th cycle, treatment compromised by bowel perforation and extended 

hospitalization and rehabilitation through [facility]


 - Not current candidate for chemotherapy at this time, so has remained on 

surveillance under the care of Dr. Pardo 


 - CT abd and Pelvis is unclear and possibly concerning for recurrence. I have 

discussed with Dr. Pardo and we feel an outpatient PET scan would be best to 

clarify if recurrent disease has represented. If so, a Biopsy will be ordered 

as outpatient for confirmation of recurrence





2. Pain and nausea unrelieved with outpatient management:


 - Continue current supportive care and symptom control, when symptoms are 

stabilized plan to discharge and follow-up for further diagnostics to 

differentiate recurrent lymphoma versus other








Thank You for allowing us to care along  with this patient


Sydney Carpenter NP.

## 2018-07-09 NOTE — P.GSCN
<Melody Turcios - Last Filed: 18 15:34>





History of Present Illness


Consult date: 18


Reason for Consult: 





Gastric outlet obstruction


History of present illness: 





58-year-old male being seen at the request of the attending for a surgical eval 

who has developed over the last week increased abdominal and lower back pain.  

Patient does have a history of non-Hodgkin lymphoma with a history of a partial 

gastrectomy and a biliary stent placement.  Patient's wife at the bedside 

history is been taken from the wife and the patient.  According to the wife 

they were at [facility] in [place] a month ago was seen by a surgeon who told 

the wife and the patient there was nothing they could do  did not know why they 

were there they were asked Dr. Pardo's office last Tuesday and patient was not 

experiencing any pain.  The wife states that Dr. Pardo told her that the 

lymphoma was improving patient and the wife indicated they do not want to go 

back to [facility] they want her health care to be local.  Patient was last 

seen by Dr. Baker in 2018 and on in hospital consultation note 

patient has been lost to follow-up.  Patient does have a biliary tube in place 

continues to report having abdominal and lower back pain patient was sent to 

[facility] initially in 2017.  At that time patient was lost 

to follow-up with Dr. Baker currently sitting up in a chair and continues 

to report having lower abdominal pain and back pain





CAT scan of the abdomen pelvis obtained emergency room report indicate 

lobulated mass along the fourth portion of the duodenum appears larger this 

mass effect causing new narrowing of the left renal vein aggressive neoplastic 

lymphoma is suggested. kelvin -en-y gastric bypass with prominent fluid 

distention of the gastric pouch as well as the excluded portion of the stomach.





Review of Systems





Essentially unremarkable except as mentioned in the present illness





Past Medical History


Past Medical History: Cancer


Additional Past Medical History / Comment(s): 17 abdominal pain/anemia 

with surgical intervention, 10/2017 lymphoma diagnosed/abdominal masses at [facility] 

and pt started chemo there-1 session then 3 more sessions thru [facility]/Dr. Pardo, chemo caused severe abdominal pain and pt was diagnosed with perforated 

intestines d/t abdominal masses shrinkage/pulling on intestine-[facility] stated pt 

not surgical candidate and placed on antibiotics/long complicated 

hospitalization per spouse.


History of Any Multi-Drug Resistant Organisms: None Reported


Past Surgical History: Appendectomy, Orthopedic Surgery


Additional Past Surgical History / Comment(s): 17 laparoscopic partial 

gastrectomy with lysis of adhesions, reduction R inguinal hernia, resection 

gastroenteric fistula, gastrojujunostomy with reconstruction, EGDs/colonoscopy, 

ACL bilateral repairs, several bilateral knee arthroscopies, PICC line inserted 

17.


Past Anesthesia/Blood Transfusion Reactions: No Reported Reaction


Additional Past Anesthesia/Blood Transfusion Reaction / Comm: Pt has received 

blood without reaction.


Smoking Status: Former smoker





- Past Family History


  ** Father


Family Medical History: Pneumonia


Additional Family Medical History / Comment(s): Father had anemia.  He  at 

the age of 79yrs from complications after a ingrown toenail was removed and 

then became infected and had to have amputation.





  ** Mother


Family Medical History: Dementia


Additional Family Medical History / Comment(s): Mother  of dementia at the 

age of 82 yrs.





Medications and Allergies


 Home Medications











 Medication  Instructions  Recorded  Confirmed  Type


 


Ergocalciferol [Vitamin D2 50,000 unit PO WE 01/15/18 07/09/18 History





(DRISDOL)]    


 


Insulin Lispro [humaLOG Kwikpen] See Protocol SQ ACHS 18 History


 


LORazepam [Ativan] 1 mg PO Q8H PRN 18 History


 


Morphine Sulfate ER [Ms Contin] 30 mg PO DAILY 18 History


 


Omeprazole [PriLOSEC] 20 mg PO DAILY 18 History


 


Ondansetron HCl [Zofran] 4 mg PO TID PRN 18 History


 


Prochlorperazine [Compazine] 10 mg PO Q6H PRN 18 History


 


Sennosides [Senna] 8.6 mg PO DAILY 18 History


 


Sertraline [Zoloft] 100 mg PO DAILY 18 History


 


fentaNYL 25MCG/HR PATCH [Duragesic 1 patch TRANSDERM Q72H 18 

History





25MCG/HR]    











 Allergies











Allergy/AdvReac Type Severity Reaction Status Date / Time


 


medication for TB exposure Allergy  Unknown Uncoded 18 08:19














Surgical - Exam


 Vital Signs











Temp Pulse Resp BP Pulse Ox


 


 98.3 F   126 H  18   118/67   100 


 


 18 08:13  18 08:13  18 08:13  18 08:13  18 08:13














GENERAL APPEARANCE: Frail 58-year-old male sitting up in a chair is alert, 

oriented, reports having abdominal and lower back pain


VITAL SIGNS: Reviewed


HEENT: Head is normocephalic and atraumatic. Pupils are equal and reactive. The 

nares are patent. Oropharynx is clear without lesions.


NECK: Supple without lymphadenopathy. Traches midline.


HEART: S1, S2. Regular rate and rhythm.  No murmur noted


LUNGS: No crackles or wheezes are heard.  On room air no shortness of


ABDOMEN: Soft, diffuse tenderness biliary drain in place dressing around site 

dry, nondistended with good bowel sounds. No peritoneal signs. No palpable 

organomegaly or masses.  Reports a nausea sensation no active emesis no stool


EXTREMITIES: Normal skin color and turgor. No cyanosis, rash, ulceration, 

clubbing or edema. Radial pedal pulses are 2/4 bilaterally.


NEUROLOGICAL: No focal deficits. Strength and sensation are grossly intact.








Results





- Labs





 18 09:00





 18 09:00


 Abnormal Lab Results - Last 24 Hours (Table)











  18 Range/Units





  09:00 09:00 09:00 


 


RBC   2.80 L   (4.30-5.90)  m/uL


 


Hgb   8.2 L   (13.0-17.5)  gm/dL


 


Hct   24.6 L   (39.0-53.0)  %


 


Lymphocytes #   0.2 L   (1.0-4.8)  k/uL


 


Sodium  135 L    (137-145)  mmol/L


 


BUN  30 H    (9-20)  mg/dL


 


Creatinine  0.35 L    (0.66-1.25)  mg/dL


 


Glucose  102 H    (74-99)  mg/dL


 


Plasma Lactic Acid Len    0.5 L  (0.7-2.0)  mmol/L


 


Alkaline Phosphatase  151 H    ()  U/L


 


Total Protein  5.6 L    (6.3-8.2)  g/dL


 


Albumin  3.2 L    (3.5-5.0)  g/dL


 


Amylase  <30 L    ()  U/L








 Diabetes panel











  18 Range/Units





  09:00 


 


Sodium  135 L  (137-145)  mmol/L


 


Potassium  4.4  (3.5-5.1)  mmol/L


 


Chloride  99  ()  mmol/L


 


Carbon Dioxide  25  (22-30)  mmol/L


 


BUN  30 H  (9-20)  mg/dL


 


Creatinine  0.35 L  (0.66-1.25)  mg/dL


 


Glucose  102 H  (74-99)  mg/dL


 


Calcium  8.4  (8.4-10.2)  mg/dL


 


AST  25  (17-59)  U/L


 


ALT  34  (21-72)  U/L


 


Alkaline Phosphatase  151 H  ()  U/L


 


Total Protein  5.6 L  (6.3-8.2)  g/dL


 


Albumin  3.2 L  (3.5-5.0)  g/dL








 Calcium panel











  18 Range/Units





  09:00 


 


Calcium  8.4  (8.4-10.2)  mg/dL


 


Albumin  3.2 L  (3.5-5.0)  g/dL








 Pituitary panel











  18 Range/Units





  09:00 


 


Sodium  135 L  (137-145)  mmol/L


 


Potassium  4.4  (3.5-5.1)  mmol/L


 


Chloride  99  ()  mmol/L


 


Carbon Dioxide  25  (22-30)  mmol/L


 


BUN  30 H  (9-20)  mg/dL


 


Creatinine  0.35 L  (0.66-1.25)  mg/dL


 


Glucose  102 H  (74-99)  mg/dL


 


Calcium  8.4  (8.4-10.2)  mg/dL








 Adrenal panel











  18 Range/Units





  09:00 


 


Sodium  135 L  (137-145)  mmol/L


 


Potassium  4.4  (3.5-5.1)  mmol/L


 


Chloride  99  ()  mmol/L


 


Carbon Dioxide  25  (22-30)  mmol/L


 


BUN  30 H  (9-20)  mg/dL


 


Creatinine  0.35 L  (0.66-1.25)  mg/dL


 


Glucose  102 H  (74-99)  mg/dL


 


Calcium  8.4  (8.4-10.2)  mg/dL


 


Total Bilirubin  1.0  (0.2-1.3)  mg/dL


 


AST  25  (17-59)  U/L


 


ALT  34  (21-72)  U/L


 


Alkaline Phosphatase  151 H  ()  U/L


 


Total Protein  5.6 L  (6.3-8.2)  g/dL


 


Albumin  3.2 L  (3.5-5.0)  g/dL














Assessment and Plan


Assessment: 





Impression


Present on admission abdominal lower back pain unclear etiology


History of non-Hodgkin lymphoma


History of a partial gastrotomy with biliary stent placement


Present on admission dehydration








Plan


Further recommendations by surgical service after Dr. Vega evaluates 

patient


Pain control by the attending


GI and DVT  prophylaxis


IV fluid for hydration


We'll follow with you


Await further recommendations by oncology














Surgical consultation dictated for Dr. Vega








The above impression and plan of care have been discussed and directed by 

signing physician. Melody Turcios nurse practitioner acting as scribe for signing 

physician.











<Laurie Vega - Last Filed: 18 17:38>





Surgical - Exam


 Vital Signs











Temp Pulse Resp BP Pulse Ox


 


 98.3 F   126 H  18   118/67   100 


 


 18 08:13  18 08:13  18 08:13  18 08:13  18 08:13














Results





- Labs





 18 09:00





 18 09:00


 Abnormal Lab Results - Last 24 Hours (Table)











  18 Range/Units





  09:00 09:00 09:00 


 


RBC   2.80 L   (4.30-5.90)  m/uL


 


Hgb   8.2 L   (13.0-17.5)  gm/dL


 


Hct   24.6 L   (39.0-53.0)  %


 


Lymphocytes #   0.2 L   (1.0-4.8)  k/uL


 


Sodium  135 L    (137-145)  mmol/L


 


BUN  30 H    (9-20)  mg/dL


 


Creatinine  0.35 L    (0.66-1.25)  mg/dL


 


Glucose  102 H    (74-99)  mg/dL


 


Plasma Lactic Acid Len    0.5 L  (0.7-2.0)  mmol/L


 


Alkaline Phosphatase  151 H    ()  U/L


 


Total Protein  5.6 L    (6.3-8.2)  g/dL


 


Albumin  3.2 L    (3.5-5.0)  g/dL


 


Amylase  <30 L    ()  U/L








 Microbiology - Last 24 Hours (Table)











 18 09:26 Urine Culture - Preliminary





 Urine,Clean Catch 








 Diabetes panel











  18 Range/Units





  09:00 


 


Sodium  135 L  (137-145)  mmol/L


 


Potassium  4.4  (3.5-5.1)  mmol/L


 


Chloride  99  ()  mmol/L


 


Carbon Dioxide  25  (22-30)  mmol/L


 


BUN  30 H  (9-20)  mg/dL


 


Creatinine  0.35 L  (0.66-1.25)  mg/dL


 


Glucose  102 H  (74-99)  mg/dL


 


Calcium  8.4  (8.4-10.2)  mg/dL


 


AST  25  (17-59)  U/L


 


ALT  34  (21-72)  U/L


 


Alkaline Phosphatase  151 H  ()  U/L


 


Total Protein  5.6 L  (6.3-8.2)  g/dL


 


Albumin  3.2 L  (3.5-5.0)  g/dL








 Calcium panel











  18 Range/Units





  09:00 


 


Calcium  8.4  (8.4-10.2)  mg/dL


 


Albumin  3.2 L  (3.5-5.0)  g/dL








 Pituitary panel











  18 Range/Units





  09:00 


 


Sodium  135 L  (137-145)  mmol/L


 


Potassium  4.4  (3.5-5.1)  mmol/L


 


Chloride  99  ()  mmol/L


 


Carbon Dioxide  25  (22-30)  mmol/L


 


BUN  30 H  (9-20)  mg/dL


 


Creatinine  0.35 L  (0.66-1.25)  mg/dL


 


Glucose  102 H  (74-99)  mg/dL


 


Calcium  8.4  (8.4-10.2)  mg/dL








 Adrenal panel











  18 Range/Units





  09:00 


 


Sodium  135 L  (137-145)  mmol/L


 


Potassium  4.4  (3.5-5.1)  mmol/L


 


Chloride  99  ()  mmol/L


 


Carbon Dioxide  25  (22-30)  mmol/L


 


BUN  30 H  (9-20)  mg/dL


 


Creatinine  0.35 L  (0.66-1.25)  mg/dL


 


Glucose  102 H  (74-99)  mg/dL


 


Calcium  8.4  (8.4-10.2)  mg/dL


 


Total Bilirubin  1.0  (0.2-1.3)  mg/dL


 


AST  25  (17-59)  U/L


 


ALT  34  (21-72)  U/L


 


Alkaline Phosphatase  151 H  ()  U/L


 


Total Protein  5.6 L  (6.3-8.2)  g/dL


 


Albumin  3.2 L  (3.5-5.0)  g/dL














Assessment and Plan


Plan: 





Ok to start diet.


Await oncology recommendations

## 2018-07-10 LAB
ALBUMIN SERPL-MCNC: 2.8 G/DL (ref 3.5–5)
ANION GAP SERPL CALC-SCNC: 11 MMOL/L
BASOPHILS # BLD AUTO: 0 K/UL (ref 0–0.2)
BASOPHILS NFR BLD AUTO: 0 %
BUN SERPL-SCNC: 12 MG/DL (ref 9–20)
CALCIUM SPEC-MCNC: 8.4 MG/DL (ref 8.4–10.2)
CHLORIDE SERPL-SCNC: 100 MMOL/L (ref 98–107)
CO2 SERPL-SCNC: 24 MMOL/L (ref 22–30)
EOSINOPHIL # BLD AUTO: 0.1 K/UL (ref 0–0.7)
EOSINOPHIL NFR BLD AUTO: 2 %
ERYTHROCYTE [DISTWIDTH] IN BLOOD BY AUTOMATED COUNT: 2.81 M/UL (ref 4.3–5.9)
ERYTHROCYTE [DISTWIDTH] IN BLOOD: 13.9 % (ref 11.5–15.5)
GLUCOSE BLD-MCNC: 103 MG/DL (ref 75–99)
GLUCOSE BLD-MCNC: 109 MG/DL (ref 75–99)
GLUCOSE BLD-MCNC: 110 MG/DL (ref 75–99)
GLUCOSE BLD-MCNC: 96 MG/DL (ref 75–99)
GLUCOSE SERPL-MCNC: 96 MG/DL (ref 74–99)
HCT VFR BLD AUTO: 25.1 % (ref 39–53)
HGB BLD-MCNC: 8.1 GM/DL (ref 13–17.5)
LYMPHOCYTES # SPEC AUTO: 0.2 K/UL (ref 1–4.8)
LYMPHOCYTES NFR SPEC AUTO: 6 %
MAGNESIUM SPEC-SCNC: 1.6 MG/DL (ref 1.6–2.3)
MCH RBC QN AUTO: 28.9 PG (ref 25–35)
MCHC RBC AUTO-ENTMCNC: 32.5 G/DL (ref 31–37)
MCV RBC AUTO: 89 FL (ref 80–100)
MONOCYTES # BLD AUTO: 0.3 K/UL (ref 0–1)
MONOCYTES NFR BLD AUTO: 11 %
NEUTROPHILS # BLD AUTO: 2.4 K/UL (ref 1.3–7.7)
NEUTROPHILS NFR BLD AUTO: 79 %
PLATELET # BLD AUTO: 220 K/UL (ref 150–450)
POTASSIUM SERPL-SCNC: 4.1 MMOL/L (ref 3.5–5.1)
SODIUM SERPL-SCNC: 135 MMOL/L (ref 137–145)
TRIGL SERPL-MCNC: 110 MG/DL (ref ?–150)
VIT B12 SERPL-MCNC: 826 PG/ML (ref 200–944)
WBC # BLD AUTO: 3 K/UL (ref 3.8–10.6)

## 2018-07-10 RX ADMIN — MORPHINE SULFATE PRN MG: 2 INJECTION, SOLUTION INTRAMUSCULAR; INTRAVENOUS at 04:12

## 2018-07-10 RX ADMIN — MAGNESIUM SULFATE IN DEXTROSE SCH MLS/HR: 10 INJECTION, SOLUTION INTRAVENOUS at 15:04

## 2018-07-10 RX ADMIN — SODIUM CHLORIDE SCH MLS/HR: 9 INJECTION, SOLUTION INTRAVENOUS at 17:24

## 2018-07-10 RX ADMIN — MORPHINE SULFATE PRN MG: 2 INJECTION, SOLUTION INTRAMUSCULAR; INTRAVENOUS at 12:40

## 2018-07-10 RX ADMIN — MORPHINE SULFATE PRN MG: 2 INJECTION, SOLUTION INTRAMUSCULAR; INTRAVENOUS at 00:16

## 2018-07-10 RX ADMIN — INSULIN ASPART SCH: 100 INJECTION, SOLUTION INTRAVENOUS; SUBCUTANEOUS at 17:12

## 2018-07-10 RX ADMIN — INSULIN ASPART SCH: 100 INJECTION, SOLUTION INTRAVENOUS; SUBCUTANEOUS at 21:37

## 2018-07-10 RX ADMIN — SERTRALINE HYDROCHLORIDE SCH MG: 100 TABLET ORAL at 08:34

## 2018-07-10 RX ADMIN — SOYBEAN OIL SCH MLS/HR: 20 INJECTION, SOLUTION INTRAVENOUS at 17:28

## 2018-07-10 RX ADMIN — ONDANSETRON PRN MG: 2 INJECTION INTRAMUSCULAR; INTRAVENOUS at 19:42

## 2018-07-10 RX ADMIN — MORPHINE SULFATE PRN MG: 15 TABLET ORAL at 21:38

## 2018-07-10 RX ADMIN — MORPHINE SULFATE PRN MG: 15 TABLET ORAL at 15:21

## 2018-07-10 RX ADMIN — SODIUM CHLORIDE SCH MLS/HR: 9 INJECTION, SOLUTION INTRAVENOUS at 08:45

## 2018-07-10 RX ADMIN — CEFAZOLIN SCH MLS/HR: 330 INJECTION, POWDER, FOR SOLUTION INTRAMUSCULAR; INTRAVENOUS at 21:40

## 2018-07-10 RX ADMIN — CEFAZOLIN SCH: 330 INJECTION, POWDER, FOR SOLUTION INTRAMUSCULAR; INTRAVENOUS at 16:06

## 2018-07-10 RX ADMIN — ONDANSETRON PRN MG: 2 INJECTION INTRAMUSCULAR; INTRAVENOUS at 10:28

## 2018-07-10 RX ADMIN — INSULIN ASPART SCH: 100 INJECTION, SOLUTION INTRAVENOUS; SUBCUTANEOUS at 07:22

## 2018-07-10 RX ADMIN — MAGNESIUM SULFATE IN DEXTROSE SCH MLS/HR: 10 INJECTION, SOLUTION INTRAVENOUS at 17:15

## 2018-07-10 RX ADMIN — SENNOSIDES SCH MG: 8.6 TABLET, FILM COATED ORAL at 08:34

## 2018-07-10 RX ADMIN — MORPHINE SULFATE PRN MG: 2 INJECTION, SOLUTION INTRAMUSCULAR; INTRAVENOUS at 18:10

## 2018-07-10 RX ADMIN — PANTOPRAZOLE SODIUM SCH MG: 40 INJECTION, POWDER, FOR SOLUTION INTRAVENOUS at 08:34

## 2018-07-10 RX ADMIN — MORPHINE SULFATE PRN MG: 2 INJECTION, SOLUTION INTRAMUSCULAR; INTRAVENOUS at 08:33

## 2018-07-10 RX ADMIN — CEFAZOLIN SCH MLS/HR: 330 INJECTION, POWDER, FOR SOLUTION INTRAMUSCULAR; INTRAVENOUS at 15:21

## 2018-07-10 RX ADMIN — INSULIN ASPART SCH: 100 INJECTION, SOLUTION INTRAVENOUS; SUBCUTANEOUS at 12:21

## 2018-07-10 NOTE — P.PN
Subjective


Progress Note Date: 07/10/18





58-year-old sitting up in a chair wife at bedside patient continues to report 

having pain  states the pain is unbearable he has not had any pain relief.  

Patient reports  nausea sensation no vomiting.  Patients being followed by 

oncology service did note that the nurse at the bedside stated that when she 

went to change the fentanyl patch to a new 25 MCG his home dose on the MAR he 

was wearing to patches of 25 MCG of fentanyl.  The patient and the wife stated 

that they put to patches onto total 53 days ago because his pain was so severe.

  According to the patient's wife his pain medications as an outpatient there 

were trying to wean him off his fentanyl and transition to oral morphine..  

Patient's pain crisis could be due to changes in his opiate management as 

indicated by oncology





Objective





- Vital Signs


Vital signs: 


 Vital Signs











Temp  97.9 F   07/10/18 07:39


 


Pulse  98   07/10/18 07:39


 


Resp  16   07/10/18 07:39


 


BP  108/71   07/10/18 07:39


 


Pulse Ox  99   07/10/18 07:39








 Intake & Output











 07/09/18 07/10/18 07/10/18





 18:59 06:59 18:59


 


Intake Total 1119 970 970


 


Output Total 250 650 


 


Balance 869 320 970


 


Weight 65.771 kg  65.771 kg


 


Intake:   


 


  IV  970 970


 


    Sodium Chloride 0.9% 1,  720 720





    000 ml @ 120 mls/hr IV .   





    Q8H20M On license of [facility] Rx#:[id number]   


 


    Vancomycin 1,250 mg In  250 250





    Sodium Chloride 0.9% 250   





    ml @ 125 mls/hr IVPB ONCE   





    [facility] Rx#:[id number]   


 


  Intake, IV Titration 1119  





  Amount   


 


    Sodium Chloride 0.9% 1, 120  





    000 ml @ 120 mls/hr IV .   





    Q8H20M On license of [facility] Rx#:[id number]   


 


    Sodium Chloride 0.9% 1, 999  





    000 ml @ 999 mls/hr IV .   





    Q1H1M [facility] Rx#:[id number]   


 


Output:   


 


  Drainage  325 


 


    Abdomen  325 


 


  Urine 250 325 


 


Other:   


 


  Voiding Method Toilet Urinal Urinal


 


  # Voids  3 














- Exam





Physical exam


58-year-old thin cachectic sitting up in bed states has generalized weakness no 

appetite with no relief from his lower abdominal pain


Lungs diminished at the bases no shortness of breath


Heart S1-S2 audible regular


Abdomen biliary drain in place nontender bowel tones after states no stool 

states a nausea sensation tenderness throughout the abdominal wall 


extremities muscle wasting to the upper and lower extremities








- Labs


CBC & Chem 7: 


 07/10/18 08:24





 07/10/18 13:19


Labs: 


 Abnormal Lab Results - Last 24 Hours (Table)











  07/09/18 07/09/18 07/10/18 Range/Units





  09:00 20:03 07:07 


 


WBC     (3.8-10.6)  k/uL


 


RBC     (4.30-5.90)  m/uL


 


Hgb     (13.0-17.5)  gm/dL


 


Hct     (39.0-53.0)  %


 


Lymphocytes #     (1.0-4.8)  k/uL


 


Retic Count   2.6 H   (0.5-2.0)  %


 


Sodium     (137-145)  mmol/L


 


Creatinine     (0.66-1.25)  mg/dL


 


POC Glucose (mg/dL)    103 H  (75-99)  mg/dL


 


Uric Acid  1.5 L    (3.5-8.5)  mg/dL


 


Albumin     (3.5-5.0)  g/dL














  07/10/18 07/10/18 Range/Units





  08:24 13:19 


 


WBC  3.0 L   (3.8-10.6)  k/uL


 


RBC  2.81 L   (4.30-5.90)  m/uL


 


Hgb  8.1 L   (13.0-17.5)  gm/dL


 


Hct  25.1 L   (39.0-53.0)  %


 


Lymphocytes #  0.2 L   (1.0-4.8)  k/uL


 


Retic Count    (0.5-2.0)  %


 


Sodium   135 L  (137-145)  mmol/L


 


Creatinine   0.38 L  (0.66-1.25)  mg/dL


 


POC Glucose (mg/dL)    (75-99)  mg/dL


 


Uric Acid    (3.5-8.5)  mg/dL


 


Albumin   2.8 L  (3.5-5.0)  g/dL








 Microbiology - Last 24 Hours (Table)











 07/09/18 09:26 Urine Culture - Final





 Urine,Clean Catch 


 


 07/09/18 09:00 Blood Culture Gram Stain - Preliminary





 Blood Blood Culture - Preliminary





    Coagulase Negative Staph


 


 07/09/18 09:00 Blood Culture - Final





 Blood 














Assessment and Plan


Assessment: 





Impression


Present on admission abdominal lower back pain unclear etiology


High-grade B cell non-Hodgkin lymphoma


History of a partial gastrotomy with biliary stent placement


Present on admission dehydration








Plan


No surgical intervention indicated at this time


Nutritional support


Pain control by the attending


GI and DVT  prophylaxis


IV fluid for hydration

















dictated for Dr. Vega








The above impression and plan of care have been discussed and directed by 

signing physician. Melody Turcios nurse practitioner acting as scribe for signing 

physician.

## 2018-07-10 NOTE — P.PN
Subjective


Progress Note Date: 07/10/18


Principal diagnosis: 





B Cell Lymphoma





This a very nice patient who presented in May,2017 with abdominal pain and 

anemia,it was mild abdominal pain,off/on,he had colonoscopy on 08/16/2017 which 

revealed benign sigmoid poly.


He had a CT scan of abdomen/pelvis on 08/30/2017 which reported irregular thick 

fluid collection in upper abdomen suspicious for abscess (upon futher review,

there was mensterci adenopathies noted).


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


On 09/03/2017,he had an EGD ,biopsies taken revealed acute inflammation.


On 09/22/2017,he underwent partial gastrectomy,pathology from gastric specimen 

was benign.


He continued to have abdominal pain and anemia,had repeat CT scan of abdomen on 

11/17/2017 which revealed extensive adenopathies in the mesentery,encasing 

vasculature,measuring up to 3.9x7.3x3.7cm,which progressed compared to prior CT 

scan.


On 11/25/2017,CT scan of chest revealed non specific adenopathies.





Shortly after I saw him for the first time on 11/21/2017,I did discuss his care 

with Dr Carlton but she opted not to proceed with left inguinal node biopsy,

that weekend,he ended up in ER with worsening abdominal pain and obstruction,he 

was transferred to [facility] had biliary drain tube placement.


On 11/27/2017,he had a biopsy of abdominal mass which was positive for high 

grade B cell lymphoma,RICO negative,BCL6 positive,Non germinal center,FISH for 

MYC and BCL6 were negative but positive for BCL2.


Bone marrow biopsy done on 12/01/2017 was negative and he received first cycle 

of RCHOP at [facility] as inpatient.


Repeat CT scan on 01/26/2018 ,revealed significant improvement in his lymphoma,

persistent fluid collection.


He had total of 4 cycles of RCHOP,last one on 02/02/2018


He ended up re admitted to [facility] after cycle#4 with bowel perforation,infection,

had along hospital stay for about 6 weeks and discharged for rehab.





Rock was seen in office of Friday with increasing pain, nausea and vomiting. 

He was unable to eat because he continued to feel nauseated. He did state he 

was having bowel movements at that time. He was clinically appearing 

dehydrated. IV Hydration and antiemetics were given and abdominal flat plate 

ordered. Flat plate failed to show concern for obstruction. He was feeling 

better and discharged home from the office. 





7/9/18 - He presented to [facility] Emergency Department with complaints of the 

pain in his abdomen persisting and worsening. CT scan of Abdomen and Pelvis was 

compared to previous diagnostic imaging at [facility]. This may present recurrence, 

difficult to tell secondary to patients multiple surgeries and complications in 

this area. He is not a surgical candidate through [facility]. 





7/10/18 - Patient seen in follow-up today. Spoke with RN and she stated when 

she went to change his fentanyl patch to a new 25mcg (his home dose per MAR) he 

was wearing 2 - 25mcg fentanyls. She stated the morphine is not helping his 

pain for longer than an hour to keep his pain score lower than 6. When the 

patient and wife was asked about how long he was wearing a total of 50mcg 

fentanyl they stated they went back up to 50mcg 3 days ago because his pain was 

so severe. I further reviewed his ofice chart and inactive medications and he 

was recently stopped on 30mg IR morphine and given Norco. Last month he was 

also maintained on 75mcg fentanyl patches. According to patient and wife Dr. Wilkerson manages his pain medications as an outpatient was trying to wean him 

off his fentanyl and po prn morphine because he didnt need as much for PRN and 

his pain seemed to be well controlled. It appears that the increased pain 

crisis he is having and nausea may also be related to his change in opioid 

management. 





Objective





- Vital Signs


Vital signs: 


 Vital Signs











Temp  97.9 F   07/10/18 07:39


 


Pulse  98   07/10/18 07:39


 


Resp  16   07/10/18 07:39


 


BP  108/71   07/10/18 07:39


 


Pulse Ox  99   07/10/18 07:39








 Intake & Output











 07/09/18 07/10/18 07/10/18





 18:59 06:59 18:59


 


Intake Total 1119 970 


 


Output Total 250 650 


 


Balance 869 320 


 


Weight 65.771 kg  


 


Intake:   


 


  IV  970 


 


    Sodium Chloride 0.9% 1,  720 





    000 ml @ 120 mls/hr IV .   





    Q8H20M [facility] Rx#:[id number]   


 


    Vancomycin 1,250 mg In  250 





    Sodium Chloride 0.9% 250   





    ml @ 125 mls/hr IVPB ONCE   





    [facility] Rx#:[id number]   


 


  Intake, IV Titration 1119  





  Amount   


 


    Sodium Chloride 0.9% 1, 120  





    000 ml @ 120 mls/hr IV .   





    Q8H20M [facility] Rx#:[id number]   


 


    Sodium Chloride 0.9% 1, 999  





    000 ml @ 999 mls/hr IV .   





    Q1H1M [facility] Rx#:[id number]   


 


Output:   


 


  Drainage  325 


 


    Abdomen  325 


 


  Urine 250 325 


 


Other:   


 


  Voiding Method Toilet Urinal Urinal


 


  # Voids  3 














- Constitutional


General appearance: Present: cooperative, no acute distress, thin





- EENT


Eyes: Present: EOMI, dentition normal


ENT: Present: NA/AT, normal oropharynx





- Neck


Details: 





Supple, trachea Midline





- Respiratory


Respiratory: bilateral: CTA (No increased effort)





- Cardiovascular


Heart rate: 104


Rhythm: regular


Heart sounds: normal: S1, S2





- Gastrointestinal


Gastrointestinal Comment(s): 





Drain intact, mulriple areas on abdomen to suggest previous surgeries. 


General gastrointestinal: Present: normal bowel sounds, soft, tenderness





- Integumentary


Integumentary: Present: normal





- Neurologic


Neurologic Comment(s): 





No focal Defects


Neurologic: Present: CNII-XII intact





- Musculoskeletal


Musculoskeletal: Present: generalized weakness, strength equal bilaterally





- Psychiatric


Psychiatric: Present: A&O x's 3, appropriate affect, intact judgment & insight





- Labs


CBC & Chem 7: 


 07/10/18 08:24





 07/09/18 09:00


Labs: 


 Abnormal Lab Results - Last 24 Hours (Table)











  07/09/18 07/09/18 07/10/18 Range/Units





  09:00 20:03 07:07 


 


WBC     (3.8-10.6)  k/uL


 


RBC     (4.30-5.90)  m/uL


 


Hgb     (13.0-17.5)  gm/dL


 


Hct     (39.0-53.0)  %


 


Lymphocytes #     (1.0-4.8)  k/uL


 


Retic Count   2.6 H   (0.5-2.0)  %


 


POC Glucose (mg/dL)    103 H  (75-99)  mg/dL


 


Uric Acid  1.5 L    (3.5-8.5)  mg/dL














  07/10/18 Range/Units





  08:24 


 


WBC  3.0 L  (3.8-10.6)  k/uL


 


RBC  2.81 L  (4.30-5.90)  m/uL


 


Hgb  8.1 L  (13.0-17.5)  gm/dL


 


Hct  25.1 L  (39.0-53.0)  %


 


Lymphocytes #  0.2 L  (1.0-4.8)  k/uL


 


Retic Count   (0.5-2.0)  %


 


POC Glucose (mg/dL)   (75-99)  mg/dL


 


Uric Acid   (3.5-8.5)  mg/dL








 Microbiology - Last 24 Hours (Table)











 07/09/18 09:00 Blood Culture Gram Stain - Preliminary





 Blood Blood Culture - Preliminary





    Coagulase Negative Staph


 


 07/09/18 09:00 Blood Culture - Final





 Blood 


 


 07/09/18 09:26 Urine Culture - Preliminary





 Urine,Clean Catch 














Assessment and Plan


Plan: 





Assessment and Recommendations:





1. High Grade B Cell Lymphoma - Status Post 4 cycles of R-CHOP 


 - After 4th cycle, treatment compromised by bowel perforation and extended 

hospitalization and rehabilitation through [facility]


 - Not current candidate for chemotherapy at this time, so has remained on 

surveillance under the care of Dr. Pardo 


 - CT abd and Pelvis is unclear and possibly concerning for recurrence. I have 

discussed with Dr. Pardo and we feel an outpatient PET scan would be best to 

clarify if recurrent disease has represented. If so, a Biopsy will be ordered 

as outpatient for confirmation of recurrence





2. Pain and nausea unrelieved with outpatient management:


 - Continue current supportive care and symptom control, when symptoms are 

stabilized plan to discharge and follow-up for further diagnostics to 

differentiate recurrent lymphoma versus other


 - Patient was recently on 75mcg of fentanyl and Morphine Sulfate PO IR every 8 

hours PRN, ordered by Dr. Wilkerson who was in the process of weaning him down on 

his pain meds and most recent wean was to fentanyl 25mcg and norco prn. The 

weaning started last month and appears to be associated with his increasing 

pain. He also self-increased his fentanyl patch at home to 50mcg 3 days ago to 

try to see if this would help decrease his discomfort. Morphine 4mg only 

appears to be controlling pain for 1-2 hours. He still appears uncomfortable


 - I have increased his fentanyl patch back to 75mcg (as he has been on 50mcg 

the past three days with no improvements)


 - I recommend we discontinue the morphine IV 2mg and keep the 4mg IV for 

severe prn pain, but decrease the duration to 3 hours


 - I also recomend we add back his morphine IR po (in preparation to discharge 

with pain controlled). Morphine Sulfate 15mg IR PRN every 6 hours (home dose 

was 30mg po)





DISPO Plan - Is to control pain for further outpatient testing, he will need 

PET scan for confirmation of disease recurrence. 








Thank You for allowing us to care along  with this patient


Sydney Carpenter NP.

## 2018-07-10 NOTE — P.CONS
History of Present Illness





- Reason for Consult


Consult date: 07/10/18





- Chief Complaint


Weakness





- History of Present Illness


58-year-old  male with a very extensive past medical history.  In 

2017 he was having abdominal pain and had extensive workup performed 

including a gastric resection with a Yisel-en-Y procedure.  Patient continue to 

have ongoing abdominal pain and follow-up imaging studies reveal evidence of 

extensive lymphadenopathy and when this was biopsy was found to be B-cell 

lymphoma.  The patient was initiated to 4 cycles of R CHOP chemotherapy.  First 

3 were well-tolerated however after the fourth cycle of chemotherapy he 

developed a bowel perforation and required an extensive course of treatment.  

The surgeons at the tertiary center were unable to perform a surgical 

intervention and percutaneous drainage was performed.  He does have a biliary 

drain remains in place at this point in time.  He has had significant weight 

loss and does maintain nutritional support via nighttime TPN.  The patient now 

has developed increasing abdominal pain.  Was seen in the oncology office 

without evidence of obstruction was given some hydration and sent home.  

However the pain increased greatly and he presented to the emergency center and 

has been admitted.  Infectious diseases consultation requested regarding the 

possibility of positive blood cultures.  The patient remains miserable with 

ongoing abdominal pain.  He has a fentanyl patch in place and is receiving 

intravenous narcotic relating that his pain is relieved only for a short period 

of time after each dose.  With his abdominal pain he has very little appetite 

but is denying nausea or emesis.  He has had no hematemesis or melena.  He is 

not having significant fevers, chills or rigors but does just feel very poorly 

overall.








Review of Systems





HEENT: Has a mild headache but it is not severe.  No acute difficulties with 

swallowing.  No thrush or significant throat pain





Lungs: Denies significant shortness of breath, cough, sputum production, or 

hemoptysis.





Cardiovascular: Denies significant shortness of breath, chest pain, chest wall 

pain, 


orthopnea, dyspnea on exertion, syncope





Gastrointestinal: appetie is poor has Abd paina s per HPI


Musculoskeletal: denies significant myalgias or arthralgias.  No new joint 

swelling.  Denies new back pain.





Skin: Denies new rash or lesions.  No new ulcers or wounds are related..





Neuro: Denies headache or visual change.  Denies any new onset weakness or 

difficulty with ambulation.  Denies falls or seizures.





Psychiatric:chronic depression related to illness


Endocrine: profound fatigue and ongoing weight loss utilizing TPN to try to 

stabilize weight 











Past Medical History


Past Medical History: Cancer


Additional Past Medical History / Comment(s): 17 abdominal pain/anemia 

with surgical intervention, 10/2017 lymphoma diagnosed/abdominal masses at [facility] 

and pt started chemo there-1 session then 3 more sessions thru [facility]/Dr. Pardo, chemo caused severe abdominal pain and pt was diagnosed with perforated 

intestines d/t abdominal masses shrinkage/pulling on intestine-[facility] stated pt 

not surgical candidate and placed on antibiotics/long complicated 

hospitalization per spouse.


History of Any Multi-Drug Resistant Organisms: None Reported


Past Surgical History: Appendectomy, Orthopedic Surgery


Additional Past Surgical History / Comment(s): 17 laparoscopic partial 

gastrectomy with lysis of adhesions, reduction R inguinal hernia, resection 

gastroenteric fistula, gastrojujunostomy with reconstruction, EGDs/colonoscopy, 

ACL bilateral repairs, several bilateral knee arthroscopies, PICC line inserted 

17.


Past Anesthesia/Blood Transfusion Reactions: No Reported Reaction


Additional Past Anesthesia/Blood Transfusion Reaction / Comm: Pt has received 

blood without reaction.


Additional Psychological History / Comment(s):  lives with the wife.  

Has adult children.  Medically disabled.  No  experience.  No 

international travel.  No animals in the home.


Smoking Status: Former smoker





- Past Family History


  ** Father


Family Medical History: Pneumonia


Additional Family Medical History / Comment(s): Father had anemia.  He  at 

the age of 79yrs from complications after a ingrown toenail was removed and 

then became infected and had to have amputation.





  ** Mother


Family Medical History: Dementia


Additional Family Medical History / Comment(s): Mother  of dementia at the 

age of 82 yrs.





Medications and Allergies


Home Medications and Allergies Comment(s): 





 Current Medications





Docusate Sodium (Colace)  100 mg PO DAILY PRN


   PRN Reason: Constipation


Fentanyl (Duragesic 75mcg/Hr Patch)  1 patch TRANSDERM Q72H [facility]


   Last Admin: 07/10/18 13:02 Dose:  1 patch


Sodium Chloride (Saline 0.9%)  1,000 mls @ 120 mls/hr IV .Q8H20M [facility]


   Last Admin: 07/10/18 21:40 Dose:  120 mls/hr


Vancomycin HCl 1,250 mg/ (Sodium Chloride)  250 mls @ 125 mls/hr IVPB Q8H [facility]


   Last Admin: 07/10/18 17:24 Dose:  125 mls/hr


Parenteral Vitamin Supplement 10 ml/ Chromium/Copper/Manganese/Seleni/Zn 1 ml/

Amino Ac/Electrol/Dextrose/Calcium  1,011 mls @ 30 mls/hr IV .Q24H [facility]


   Stop: 18 14:59


   Last Admin: 07/10/18 17:27 Dose:  30 mls/hr


Parenteral Vitamin Supplement 10 ml/ Chromium/Copper/Manganese/Seleni/Zn 1 ml/

Amino Ac/Electrol/Dextrose/Calcium  1,011 mls @ 95 mls/hr IV .BY DURATION [facility]


Amino Ac/Electrol/Dextrose/Calcium (Clinimix E 5%-D15% Solution)  1,000 mls @ 

95 mls/hr IV .BY DURATION [facility]


Fat Emulsion Intravenous 250 (ml/ IV Solution)  250 mls @ 21 mls/hr IV Q24H [facility]


   Last Admin: 07/10/18 17:28 Dose:  21 mls/hr


Ibuprofen (Motrin)  400 mg PO Q6HR PRN


   PRN Reason: Mild Pain or Fever > 100.5


Insulin Aspart (Novolog)  0 unit SQ ACHS [facility]; Protocol


   Last Admin: 07/10/18 21:37 Dose:  Not Given


Lorazepam (Ativan)  1 mg PO Q8H PRN


   PRN Reason: Anxiety


   Last Admin: 07/10/18 21:38 Dose:  1 mg


Miscellaneous Information (Vancomycin Trough Due)  0 each MISCELLANE AS 

DIRECTED ONE


   Stop: 18 08:01


Morphine Sulfate (Msir)  15 mg PO Q4HR PRN


   PRN Reason: Pain


   Last Admin: 07/10/18 21:38 Dose:  15 mg


Morphine Sulfate (Morphine Sulfate (Inj))  4 mg IVP Q4HR PRN


   PRN Reason: Pain


   Last Admin: 07/10/18 18:10 Dose:  4 mg


Naloxone HCl (Narcan)  0.2 mg IV Q2M PRN


   PRN Reason: Opioid Reversal


Ondansetron HCl (Zofran)  4 mg IVP Q8HR PRN


   PRN Reason: Nausea And Vomiting


   Last Admin: 07/10/18 19:42 Dose:  4 mg


Pantoprazole Sodium (Protonix)  40 mg IV DAILY [facility]


   Last Admin: 07/10/18 08:34 Dose:  40 mg


Polyethylene Glycol (Miralax)  17 gm PO DAILY PRN


   PRN Reason: Constipation


Prochlorperazine Maleate (Compazine)  10 mg PO Q6H PRN


   PRN Reason: Nausea


   Last Admin: 18 21:04 Dose:  10 mg


Senna (Senokot)  8.6 mg PO DAILY [facility]


   Last Admin: 07/10/18 08:34 Dose:  8.6 mg


Sertraline HCl (Zoloft)  100 mg PO DAILY [facility]


   Last Admin: 07/10/18 08:34 Dose:  100 mg








 Home Medications











 Medication  Instructions  Recorded  Confirmed  Type


 


Ergocalciferol [Vitamin D2 50,000 unit PO WE 01/15/18 07/09/18 History





(DRISDOL)]    


 


Insulin Lispro [humaLOG Kwikpen] See Protocol SQ ACHS 18 History


 


LORazepam [Ativan] 1 mg PO Q8H PRN 18 History


 


Morphine Sulfate ER [Ms Contin] 30 mg PO DAILY 18 History


 


Omeprazole [PriLOSEC] 20 mg PO DAILY 18 History


 


Ondansetron HCl [Zofran] 4 mg PO TID PRN 18 History


 


Prochlorperazine [Compazine] 10 mg PO Q6H PRN 18 History


 


Sennosides [Senna] 8.6 mg PO DAILY 18 History


 


Sertraline [Zoloft] 100 mg PO DAILY 18 History


 


fentaNYL 25MCG/HR PATCH [Duragesic 1 patch TRANSDERM Q72H 18 

History





25MCG/HR]    











 Allergies











Allergy/AdvReac Type Severity Reaction Status Date / Time


 


medication for TB exposure Allergy  Unknown Uncoded 18 08:19














Physical Exam


Vitals: 


 Vital Signs











  Temp Pulse Resp BP Pulse Ox


 


 07/10/18 20:50  97.7 F  101 H  18  123/74  98


 


 07/10/18 20:40    16  


 


 07/10/18 14:55  98.2 F  104 H  16  108/69  99


 


 07/10/18 07:39  97.9 F  98  16  108/71  99








 Intake and Output











 07/10/18 07/10/18 07/10/18





 06:59 14:59 22:59


 


Intake Total 970 970 120


 


Output Total 350  200


 


Balance 620 970 -80


 


Intake:   


 


   970 


 


    Sodium Chloride 0.9% 1, 720 720 





    000 ml @ 120 mls/hr IV .   





    Q8H20M [facility] Rx#:[id number]   


 


    Vancomycin 1,250 mg In 250 250 





    Sodium Chloride 0.9% 250   





    ml @ 125 mls/hr IVPB ONCE   





    ONE Rx#:[id number]   


 


  Oral   120


 


Output:   


 


  Drainage 225  


 


    Abdomen 225  


 


  Urine 125  200


 


Other:   


 


  Voiding Method  Urinal Urinal


 


  # Voids 3  


 


  Weight  65.771 kg 











58-year-old  male who is underweight and chronically ill complains of 

ongoing abdominal pain


HEENT: Anicteric conjunctiva are pink and moist nasal mucosa grossly intact 

without significant lesions, there is no thrush.  Dentition is poor for age


Neck: The neck is supple without significant lymphadenopathy or thyromegaly.


Lungs: Good bilateral air entry without significant crackles or wheezing.  

There is no significant bronchial sounds.  There is no egophony or dullness.


Heart: Regular rate and rhythm with an audible S1-S2, no S3 no S4.  There is no 

significant murmur click or rub, PMI was nondisplaced.


Abdomen: Positive bowel sounds soft with some diffuse tenderness, the 

percutaneous drainage of biliary tract his ongoing drainage, splenomegaly is 

palpated, no other abdominal masses noted.  There was no guarding or rebound.  

The abdomen is not rigid


Extremities: The upper extremities have excellent pulses they are symmetric, no 

significant petechiae or telangiectasia.  No splinter hemorrhages were noted.  

The lower extremities are free from significant edema.  The peripheral pulses 

were 2+ and symmetric.  PICC line in place right upper extremity without 

difficulties


Neuro: Awake alert oriented to person place and time.  There are no acute new 

gross focal sensory motor deficits.











Results


CBC & Chem 7: 


 07/10/18 08:24





 07/10/18 13:19


Labs: 


 Abnormal Lab Results - Last 24 Hours (Table)











  07/10/18 07/10/18 07/10/18 Range/Units





  07:07 08:24 13:19 


 


WBC   3.0 L   (3.8-10.6)  k/uL


 


RBC   2.81 L   (4.30-5.90)  m/uL


 


Hgb   8.1 L   (13.0-17.5)  gm/dL


 


Hct   25.1 L   (39.0-53.0)  %


 


Lymphocytes #   0.2 L   (1.0-4.8)  k/uL


 


Sodium    135 L  (137-145)  mmol/L


 


Creatinine    0.38 L  (0.66-1.25)  mg/dL


 


POC Glucose (mg/dL)  103 H    (75-99)  mg/dL


 


Albumin    2.8 L  (3.5-5.0)  g/dL














  07/10/18 07/10/18 Range/Units





  16:40 20:50 


 


WBC    (3.8-10.6)  k/uL


 


RBC    (4.30-5.90)  m/uL


 


Hgb    (13.0-17.5)  gm/dL


 


Hct    (39.0-53.0)  %


 


Lymphocytes #    (1.0-4.8)  k/uL


 


Sodium    (137-145)  mmol/L


 


Creatinine    (0.66-1.25)  mg/dL


 


POC Glucose (mg/dL)  110 H  109 H  (75-99)  mg/dL


 


Albumin    (3.5-5.0)  g/dL








 Microbiology - Last 24 Hours (Table)











 18 09:26 Urine Culture - Final





 Urine,Clean Catch 


 


 18 09:00 Blood Culture Gram Stain - Preliminary





 Blood Blood Culture - Preliminary





    Coagulase Negative Staph


 


 18 09:00 Blood Culture - Final





 Blood 








 Laboratory Results











WBC  3.0 k/uL (3.8-10.6)  L  07/10/18  08:24    


 


RBC  2.81 m/uL (4.30-5.90)  L  07/10/18  08:24    


 


Hgb  8.1 gm/dL (13.0-17.5)  L  07/10/18  08:24    


 


Hct  25.1 % (39.0-53.0)  L  07/10/18  08:24    


 


MCV  89.0 fL (80.0-100.0)   07/10/18  08:24    


 


MCH  28.9 pg (25.0-35.0)   07/10/18  08:24    


 


MCHC  32.5 g/dL (31.0-37.0)   07/10/18  08:24    


 


RDW  13.9 % (11.5-15.5)   07/10/18  08:24    


 


Plt Count  220 k/uL (150-450)   07/10/18  08:24    


 


Neutrophils %  79 %  07/10/18  08:24    


 


Lymphocytes %  6 %  07/10/18  08:24    


 


Monocytes %  11 %  07/10/18  08:24    


 


Eosinophils %  2 %  07/10/18  08:24    


 


Basophils %  0 %  07/10/18  08:24    


 


Neutrophils #  2.4 k/uL (1.3-7.7)   07/10/18  08:24    


 


Lymphocytes #  0.2 k/uL (1.0-4.8)  L  07/10/18  08:24    


 


Monocytes #  0.3 k/uL (0-1.0)   07/10/18  08:24    


 


Eosinophils #  0.1 k/uL (0-0.7)   07/10/18  08:24    


 


Basophils #  0.0 k/uL (0-0.2)   07/10/18  08:24    


 


Manual Slide Review  Performed   07/10/18  08:24    


 


Hypochromasia (manual)  Present   07/10/18  08:24    


 


Poikilocytosis (manual  Present   07/10/18  08:24    


 


Anisocytosis (manual)  Present   07/10/18  08:24    


 


Retic Count  2.6 % (0.5-2.0)  H  18  20:03    


 


Sodium  135 mmol/L (137-145)  L  07/10/18  13:19    


 


Potassium  4.1 mmol/L (3.5-5.1)   07/10/18  13:19    


 


Chloride  100 mmol/L ()   07/10/18  13:19    


 


Carbon Dioxide  24 mmol/L (22-30)   07/10/18  13:19    


 


Anion Gap  11 mmol/L  07/10/18  13:19    


 


BUN  12 mg/dL (9-20)   07/10/18  13:19    


 


Creatinine  0.38 mg/dL (0.66-1.25)  L  07/10/18  13:19    


 


Est GFR (CKD-EPI)AfAm  >90  (>60 ml/min/1.73 sqM)   07/10/18  13:19    


 


Est GFR (CKD-EPI)NonAf  >90  (>60 ml/min/1.73 sqM)   07/10/18  13:19    


 


Glucose  96 mg/dL (74-99)   07/10/18  13:19    


 


POC Glucose (mg/dL)  109 mg/dL (75-99)  H  07/10/18  20:50    


 


POC Glu Operater ID  Terrie Domingo   07/10/18  20:50    


 


Estimated Ave Glu mg/dL  108   18  09:00    


 


Hemoglobin A1c  5.4 % (4.0-6.0)   18  09:00    


 


Plasma Lactic Acid Len  0.5 mmol/L (0.7-2.0)  L  18  09:00    


 


Uric Acid  1.5 mg/dL (3.5-8.5)  L  18  09:00    


 


Calcium  8.4 mg/dL (8.4-10.2)   07/10/18  13:19    


 


Ionized Calcium Bruno  5.0 mg/dL (4.5-5.3)   07/10/18  13:    


 


Phosphorus  4.2 mg/dL (2.5-4.5)   07/10/18  13:    


 


Magnesium  1.6 mg/dL (1.6-2.3)   07/10/18  13:19    


 


Iron  19 ug/dL ()  L  18  09:00    


 


TIBC  210 ug/dL (228-460)  L  18  09:00    


 


Iron Saturation  9.05   (15.00-50.00)  L  18  09:00    


 


Ferritin  498.2 ng/mL (22.0-322.0)  H  18  09:00    


 


Total Bilirubin  1.0 mg/dL (0.2-1.3)   18  09:00    


 


AST  25 U/L (17-59)   18  09:00    


 


ALT  34 U/L (21-72)   18  09:00    


 


Alkaline Phosphatase  151 U/L ()  H  18  09:00    


 


Lactate Dehydrogenase  540 U/L (313-618)   18  09:00    


 


Total Protein  5.6 g/dL (6.3-8.2)  L  18  09:00    


 


Albumin  2.8 g/dL (3.5-5.0)  L  07/10/18  13:19    


 


Triglycerides  110 mg/dL (<150)   07/10/18  13:19    


 


Amylase  <30 U/L ()  L  18  09:00    


 


Lipase  39 U/L ()   18  09:00    


 


Vitamin B12  826.0 pg/mL (200.0-944.0)   18  09:00    


 


Urine Color  Yellow   18  09:26    


 


Urine Appearance  Clear  (Clear)   18:26    


 


Urine pH  5.5  (5.0-8.0)   18  09:26    


 


Ur Specific Gravity  1.022  (1.001-1.035)   18:26    


 


Urine Protein  Negative  (Negative)   18:    


 


Urine Glucose (UA)  Negative  (Negative)   18:    


 


Urine Ketones  Negative  (Negative)   18:    


 


Urine Blood  Negative  (Negative)   18:    


 


Urine Nitrite  Negative  (Negative)   18:    


 


Urine Bilirubin  Negative  (Negative)   18:    


 


Urine Urobilinogen  <2.0 mg/dL (<2.0)   18:    


 


Ur Leukocyte Esterase  Negative  (Negative)   18:    








 Microbiology





18 09:   Urine,Clean Catch   Urine Culture - Final


18 09:00   Blood   Blood Culture Gram Stain - Preliminary


18 09:00   Blood   Blood Culture - Preliminary


                            Coagulase Negative Staph


18 09:00   Blood   Blood Culture - Final











Assessment and Plan


(1) B-cell lymphoma


Current Visit: Yes   Status: Acute   Code(s): C85.10 - UNSPECIFIED B-CELL 

LYMPHOMA, UNSPECIFIED SITE   SNOMED Code(s): 569382061


   





(2) Gram-positive cocci bacteremia


Narrative/Plan: 


58-year-old  male with history of B-cell lymphoma presents to hospital 

with increasing abdominal pain.  Patient has had computed tomography scan that 

shows evidence of increasing size of interabdominal mass and lymphadenopathy 

and has been seen by surgery.  No plans for surgical intervention at this time.

  His TPN continues and diet was advised by surgery.  Patient continues to have 

abdominal pain and is being followed by oncology in attempts to help improve 

his pain protocol.


There is evidence of a positive blood culture from the ER admission and the 

laboratory is now identifying this as a coagulase-negative staph, the cultures 

were obtained from the same moment and represent only 1 blood culture.  The 

patient is without fever or chills and has not recently received any 

chemotherapy.  He is somewhat immunocompromised because of his ongoing B-cell 

lymphoma, however appears to be a contamination.  If he becomes febrile follow 

blood cultures would be prudent.  His PICC line is greater than 6 months old 

and would defer to his oncologist's to the potential for a PICC line exchange 

given his ongoing TPN needs.


He has relative leukopenia but not absolute neutropenia, he has no fever and is 

not requiring antibiotic therapy at this time, computed tomography scan does 

not reveal evidence of abscess but of underlying worsening disease state.


Current Visit: Yes   Status: Acute   Code(s): R78.81 - BACTEREMIA   SNOMED Code(

s): 027180211192


   





(3) Abdominal pain


Current Visit: No   Status: Acute   Code(s): R10.9 - UNSPECIFIED ABDOMINAL PAIN

   SNOMED Code(s): 22146217


   





(4) Leukopenia


Current Visit: Yes   Status: Acute   Code(s): D72.819 - DECREASED WHITE BLOOD 

CELL COUNT, UNSPECIFIED   SNOMED Code(s): 12804630

## 2018-07-11 LAB
ANION GAP SERPL CALC-SCNC: 10 MMOL/L
BUN SERPL-SCNC: 9 MG/DL (ref 9–20)
CALCIUM SPEC-MCNC: 8.6 MG/DL (ref 8.4–10.2)
CHLORIDE SERPL-SCNC: 97 MMOL/L (ref 98–107)
CO2 SERPL-SCNC: 28 MMOL/L (ref 22–30)
GLUCOSE BLD-MCNC: 124 MG/DL (ref 75–99)
GLUCOSE BLD-MCNC: 132 MG/DL (ref 75–99)
GLUCOSE BLD-MCNC: 133 MG/DL (ref 75–99)
GLUCOSE BLD-MCNC: 136 MG/DL (ref 75–99)
GLUCOSE SERPL-MCNC: 123 MG/DL (ref 74–99)
MAGNESIUM SPEC-SCNC: 1.7 MG/DL (ref 1.6–2.3)
POTASSIUM SERPL-SCNC: 4.1 MMOL/L (ref 3.5–5.1)
SODIUM SERPL-SCNC: 135 MMOL/L (ref 137–145)

## 2018-07-11 RX ADMIN — SODIUM CHLORIDE SCH MLS/HR: 9 INJECTION, SOLUTION INTRAVENOUS at 18:48

## 2018-07-11 RX ADMIN — POLYETHYLENE GLYCOL 3350 SCH GM: 17 POWDER, FOR SOLUTION ORAL at 12:15

## 2018-07-11 RX ADMIN — MORPHINE SULFATE PRN MG: 15 TABLET ORAL at 06:03

## 2018-07-11 RX ADMIN — CEFAZOLIN SCH MLS/HR: 330 INJECTION, POWDER, FOR SOLUTION INTRAMUSCULAR; INTRAVENOUS at 06:03

## 2018-07-11 RX ADMIN — SODIUM CHLORIDE SCH: 9 INJECTION, SOLUTION INTRAVENOUS at 05:39

## 2018-07-11 RX ADMIN — NYSTATIN SCH: 100000 SUSPENSION ORAL at 22:40

## 2018-07-11 RX ADMIN — MAGNESIUM SULFATE IN DEXTROSE SCH MLS/HR: 10 INJECTION, SOLUTION INTRAVENOUS at 12:18

## 2018-07-11 RX ADMIN — SENNOSIDES SCH MG: 8.6 TABLET, FILM COATED ORAL at 08:41

## 2018-07-11 RX ADMIN — MORPHINE SULFATE PRN MG: 15 TABLET ORAL at 22:37

## 2018-07-11 RX ADMIN — LEUCINE, PHENYLALANINE, LYSINE, METHIONINE, ISOLEUCINE, VALINE, HISTIDINE, THREONINE, TRYPTOPHAN, ALANINE, GLYCINE, ARGININE, PROLINE, SERINE, TYROSINE, SODIUM ACETATE, DIBASIC POTASSIUM PHOSPHATE, MAGNESIUM CHLORIDE, SODIUM CHLORIDE, CALCIUM CHLORIDE, DEXTROSE SCH MLS/HR
365; 280; 290; 200; 300; 290; 240; 210; 90; 1035; 515; 575; 340; 250; 20; 340; 261; 51; 59; 33; 15 INJECTION INTRAVENOUS at 18:26

## 2018-07-11 RX ADMIN — PROCHLORPERAZINE MALEATE PRN MG: 10 TABLET, FILM COATED ORAL at 15:11

## 2018-07-11 RX ADMIN — MORPHINE SULFATE PRN MG: 15 TABLET ORAL at 12:13

## 2018-07-11 RX ADMIN — NYSTATIN SCH UNIT: 100000 SUSPENSION ORAL at 22:39

## 2018-07-11 RX ADMIN — CEFAZOLIN SCH MLS/HR: 330 INJECTION, POWDER, FOR SOLUTION INTRAMUSCULAR; INTRAVENOUS at 12:18

## 2018-07-11 RX ADMIN — CEFAZOLIN SCH MLS/HR: 330 INJECTION, POWDER, FOR SOLUTION INTRAMUSCULAR; INTRAVENOUS at 22:44

## 2018-07-11 RX ADMIN — INSULIN ASPART SCH: 100 INJECTION, SOLUTION INTRAVENOUS; SUBCUTANEOUS at 17:33

## 2018-07-11 RX ADMIN — SODIUM CHLORIDE SCH MLS/HR: 9 INJECTION, SOLUTION INTRAVENOUS at 10:53

## 2018-07-11 RX ADMIN — MAGNESIUM SULFATE IN DEXTROSE SCH MLS/HR: 10 INJECTION, SOLUTION INTRAVENOUS at 14:53

## 2018-07-11 RX ADMIN — INSULIN ASPART SCH: 100 INJECTION, SOLUTION INTRAVENOUS; SUBCUTANEOUS at 22:39

## 2018-07-11 RX ADMIN — SERTRALINE HYDROCHLORIDE SCH MG: 100 TABLET ORAL at 08:41

## 2018-07-11 RX ADMIN — INSULIN ASPART SCH: 100 INJECTION, SOLUTION INTRAVENOUS; SUBCUTANEOUS at 07:34

## 2018-07-11 RX ADMIN — IBUPROFEN PRN MG: 400 TABLET, FILM COATED ORAL at 22:38

## 2018-07-11 RX ADMIN — SENNOSIDES SCH MG: 8.6 TABLET, FILM COATED ORAL at 22:43

## 2018-07-11 RX ADMIN — ONDANSETRON PRN MG: 2 INJECTION INTRAMUSCULAR; INTRAVENOUS at 06:15

## 2018-07-11 RX ADMIN — MORPHINE SULFATE PRN MG: 2 INJECTION, SOLUTION INTRAMUSCULAR; INTRAVENOUS at 09:08

## 2018-07-11 RX ADMIN — PANTOPRAZOLE SODIUM SCH MG: 40 INJECTION, POWDER, FOR SOLUTION INTRAVENOUS at 08:41

## 2018-07-11 RX ADMIN — MORPHINE SULFATE PRN MG: 15 TABLET ORAL at 18:07

## 2018-07-11 RX ADMIN — INSULIN ASPART SCH: 100 INJECTION, SOLUTION INTRAVENOUS; SUBCUTANEOUS at 12:21

## 2018-07-11 RX ADMIN — ONDANSETRON PRN MG: 2 INJECTION INTRAMUSCULAR; INTRAVENOUS at 18:08

## 2018-07-11 RX ADMIN — MORPHINE SULFATE PRN MG: 2 INJECTION, SOLUTION INTRAMUSCULAR; INTRAVENOUS at 20:27

## 2018-07-11 RX ADMIN — SOYBEAN OIL SCH MLS/HR: 20 INJECTION, SOLUTION INTRAVENOUS at 18:45

## 2018-07-11 NOTE — PN
PROGRESS NOTE



DATE OF SERVICE:

07/10/2018.



PRESENTING COMPLAINT:

Abdominal pain.



INTERVAL HISTORY:

This is a patient with non-Hodgkin's lymphoma who has not done too well with the same.

Still having abdominal pain.  Sydney from Oncology did increase his pain medications. The

patient is eating small amounts.  No fever.  No chills.  Patient's wife is present.



REVIEW OF SYSTEMS:

Done for constitutional, cardiovascular, GI, pulmonary; relevant findings as above.



CURRENT MEDICATIONS:

TPN had been ordered.



PHYSICAL EXAMINATION:

On examination, afebrile, pulse 104, respiration 16, blood pressure 108/69, pulse ox

99% on room air.

GENERAL APPEARANCE:  Lying in bed, awake.

EYES: Pupils equal. Conjunctivae pale.

HEENT: External appearance of nose and ears normal. Oral cavity normal.

NECK: JVD not raised.  Mass not palpable.

RESPIRATORY: Effort normal.

LUNGS: Slightly decreased breath sounds.

CARDIOVASCULAR: First and second sounds normal. No edema.

ABDOMEN: Soft. He has a biliary drain in place. Tenderness.  No guarding or rigidity.

Liver and spleen not palpable.

PSYCHIATRY:  Alert and oriented x3.  Mood and affect slightly anxious-appearing.



INVESTIGATIONS:

White count 3, hemoglobin 8.1, potassium 4.1, albumin 2.8.



ASSESSMENT:

1. Increasing abdominal pain, at this patient felt to be possible progression of non-

    Hodgkin's lymphoma.  There is no fever, no white count, and there does not appear

    to be any active infection, as also assessed by Dr. Louise.

2. Non-Hodgkin's lymphoma, status post 4 cycles of R CHOP.

3. Normocytic anemia from underlying lymphoma.

4. Clinical dehydration from elevated BUN.

5. Mild protein-calorie malnutrition.



PLAN:

Earlier I spoke to Melody Turcios from Dr. Vega's team. From their standpoint,

nothing further to be done right now. Per oncology team, they plan to do a PET scan as

an outpatient. Per Dr. Louise, there is no active infection at the present time.  I did

speak at length with the patient and his wife.  Patient's TPN will be started tonight.

I also spoke to Sydney earlier, who already had adjusted the patient's pain medications.

The patient probably will be going home tomorrow with a view to outpatient PET scan and

see how he does.  The patient and his wife are agreeable to the same.



Total time spent today was about 40 minutes, with over 25 minutes of discussion.





MMODL / IJN: [id number] / Job#: [id number]

## 2018-07-11 NOTE — P.PN
Subjective


Progress Note Date: 07/11/18


Principal diagnosis: 





B Cell Lymphoma





This a very nice patient who presented in May,2017 with abdominal pain and 

anemia,it was mild abdominal pain,off/on,he had colonoscopy on 08/16/2017 which 

revealed benign sigmoid poly.


He had a CT scan of abdomen/pelvis on 08/30/2017 which reported irregular thick 

fluid collection in upper abdomen suspicious for abscess (upon futher review,

there was mensterci adenopathies noted).


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


On 09/03/2017,he had an EGD ,biopsies taken revealed acute inflammation.


On 09/22/2017,he underwent partial gastrectomy,pathology from gastric specimen 

was benign.


He continued to have abdominal pain and anemia,had repeat CT scan of abdomen on 

11/17/2017 which revealed extensive adenopathies in the mesentery,encasing 

vasculature,measuring up to 3.9x7.3x3.7cm,which progressed compared to prior CT 

scan.


On 11/25/2017,CT scan of chest revealed non specific adenopathies.





Shortly after I saw him for the first time on 11/21/2017,I did discuss his care 

with Dr Carlton but she opted not to proceed with left inguinal node biopsy,

that weekend,he ended up in ER with worsening abdominal pain and obstruction,he 

was transferred to [facility] had biliary drain tube placement.


On 11/27/2017,he had a biopsy of abdominal mass which was positive for high 

grade B cell lymphoma,RICO negative,BCL6 positive,Non germinal center,FISH for 

MYC and BCL6 were negative but positive for BCL2.


Bone marrow biopsy done on 12/01/2017 was negative and he received first cycle 

of RCHOP at [facility] as inpatient.


Repeat CT scan on 01/26/2018 ,revealed significant improvement in his lymphoma,

persistent fluid collection.


He had total of 4 cycles of RCHOP,last one on 02/02/2018


He ended up re admitted to [facility] after cycle#4 with bowel perforation,infection,

had along hospital stay for about 6 weeks and discharged for rehab.





Rock was seen in office of Friday with increasing pain, nausea and vomiting. 

He was unable to eat because he continued to feel nauseated. He did state he 

was having bowel movements at that time. He was clinically appearing 

dehydrated. IV Hydration and antiemetics were given and abdominal flat plate 

ordered. Flat plate failed to show concern for obstruction. He was feeling 

better and discharged home from the office. 





7/9/18 - He presented to [facility] with complaints of the 

pain in his abdomen persisting and worsening. CT scan of Abdomen and Pelvis was 

compared to previous diagnostic imaging at [facility]. This may present recurrence, 

difficult to tell secondary to patients multiple surgeries and complications in 

this area. He is not a surgical candidate through [facility]. 





7/10/18 - Patient seen in follow-up today. Spoke with RN and she stated when 

she went to change his fentanyl patch to a new 25mcg (his home dose per MAR) he 

was wearing 2 - 25mcg fentanyls. She stated the morphine is not helping his 

pain for longer than an hour to keep his pain score lower than 6. When the 

patient and wife was asked about how long he was wearing a total of 50mcg 

fentanyl they stated they went back up to 50mcg 3 days ago because his pain was 

so severe. I further reviewed his ofice chart and inactive medications and he 

was recently stopped on 30mg IR morphine and given Norco. Last month he was 

also maintained on 75mcg fentanyl patches. According to patient and wife Dr. Wilkerson manages his pain medications as an outpatient was trying to wean him 

off his fentanyl and po prn morphine because he didnt need as much for PRN and 

his pain seemed to be well controlled. It appears that the increased pain 

crisis he is having and nausea may also be related to his change in opioid 

management. 





7/11/18 - Patient has been increased back on the pain medication he was on as 

outpatient prior to his increased, uncontrolled pain. His largest complaint is 

mid back sharp pain and nausea, no emesis when seen this am. He has no 

neurological or unilateral weakness to suspect concern for cord or nerve 

involvement in back, although if persists MRI of the back maybe resonable. He 

has not moved bowels since admission, I believe this is largely contributing to 

his nausea. He is passing gas and has bowel sounds present, he is not eating as 

no appetite and food tastes bad. There is oral thrush present on exam. 





Objective





- Vital Signs


Vital signs: 


 Vital Signs











Temp  97.8 F   07/11/18 15:14


 


Pulse  97   07/11/18 16:00


 


Resp  16   07/11/18 16:00


 


BP  117/83   07/11/18 15:14


 


Pulse Ox  96   07/11/18 15:14








 Intake & Output











 07/11/18 07/11/18 07/12/18





 06:59 18:59 06:59


 


Intake Total 420 60 


 


Output Total 200 800 


 


Balance 220 -740 


 


Weight 68 kg  


 


Intake:   


 


  Intake, IV Titration 300  





  Amount   


 


    Amino Acid 5%-D15w+Lytes* 120  





    E* 1,000 ml @ 95 mls/hr   





    IV .BY DURATION [facility] Rx#:   





    [id number]   


 


    Fat Emulsion 20% 250 ml 80  





    In Empty Bag 1 bag @ 21   





    mls/hr IV Q24H [facility] Rx#:   





    [id number]   


 


    Magnesium Sulfate-D5w Pmx 100  





    1 gm In Dextrose/Water 1   





    100ml.bag @ 100 mls/hr   





    IVPB Q1H [facility] Rx#:   





    [id number]   


 


  Oral 120 60 


 


Output:   


 


  Urine 200 800 


 


Other:   


 


  Voiding Method Urinal Urinal 


 


  # Voids 4  














- Constitutional


General appearance: Present: cooperative, no acute distress





- EENT


Eyes: Present: EOMI, PERRLA, dentition normal


ENT: Present: NA/AT, pharyngeal erythema, thrush





- Neck


Details: 





Supple, trachea midline


Neck: Present: normal ROM





- Respiratory


Respiratory: bilateral: CTA (No increased effort)





- Cardiovascular


Heart rate: 102


Rhythm: regular


Heart sounds: normal: S1, S2





- Gastrointestinal


General gastrointestinal: Present: normal bowel sounds, soft, tenderness





- Integumentary


Integumentary: Present: normal





- Neurologic


Neurologic Comment(s): 





No neurological defecta


Neurologic: Present: CNII-XII intact





- Musculoskeletal


Musculoskeletal: Present: generalized weakness, strength equal bilaterally





- Psychiatric


Psychiatric: Present: A&O x's 3, appropriate affect, intact judgment & insight





- Labs


CBC & Chem 7: 


 07/10/18 08:24





 07/11/18 06:55


Labs: 


 Abnormal Lab Results - Last 24 Hours (Table)











  07/09/18 07/10/18 07/11/18 Range/Units





  09:00 20:50 06:55 


 


Sodium    135 L  (137-145)  mmol/L


 


Chloride    97 L  ()  mmol/L


 


Creatinine    0.42 L  (0.66-1.25)  mg/dL


 


Glucose    123 H  (74-99)  mg/dL


 


POC Glucose (mg/dL)   109 H   (75-99)  mg/dL


 


Iron  19 L    ()  ug/dL


 


TIBC  210 L    (228-460)  ug/dL


 


Iron Saturation  9.05 L    (15.00-50.00)   


 


Ferritin  498.2 H    (22.0-322.0)  ng/mL














  07/11/18 07/11/18 07/11/18 Range/Units





  07:13 11:51 17:15 


 


Sodium     (137-145)  mmol/L


 


Chloride     ()  mmol/L


 


Creatinine     (0.66-1.25)  mg/dL


 


Glucose     (74-99)  mg/dL


 


POC Glucose (mg/dL)  133 H  132 H  124 H  (75-99)  mg/dL


 


Iron     ()  ug/dL


 


TIBC     (228-460)  ug/dL


 


Iron Saturation     (15.00-50.00)   


 


Ferritin     (22.0-322.0)  ng/mL








 Microbiology - Last 24 Hours (Table)











 07/09/18 09:00 Blood Culture Gram Stain - Final





 Blood Blood Culture - Final





    Staphylococcus epidermidis














Assessment and Plan


Plan: 





Assessment and Recommendations:





1. High Grade B Cell Lymphoma - Status Post 4 cycles of R-CHOP 


 - After 4th cycle, treatment compromised by bowel perforation and extended 

hospitalization and rehabilitation through [facility]


 - Not current candidate for chemotherapy at this time, so has remained on 

surveillance under the care of Dr. Pardo 


 - CT abd and Pelvis is unclear and possibly concerning for recurrence. I have 

discussed with Dr. Pardo and we feel an outpatient PET scan would be best to 

clarify if recurrent disease has represented. If so, a Biopsy will be ordered 

as outpatient for confirmation of recurrence





2. Pain and nausea unrelieved with outpatient management:


 - Continue current supportive care and symptom control, when symptoms are 

stabilized plan to discharge and follow-up for further diagnostics to 

differentiate recurrent lymphoma versus other


 - Patient was recently on 75mcg of fentanyl and Morphine Sulfate PO IR every 8 

hours PRN, ordered by Dr. Wilkerson who was in the process of weaning him down on 

his pain meds and most recent wean was to fentanyl 25mcg and norco prn. The 

weaning started last month and appears to be associated with his increasing 

pain. He also self-increased his fentanyl patch at home to 50mcg 3 days ago to 

try to see if this would help decrease his discomfort. Morphine 4mg only 

appears to be controlling pain for 1-2 hours. He still appears uncomfortable


 - I have increased his fentanyl patch back to 75mcg (as he has been on 50mcg 

the past three days with no improvements)


 - I recommend we discontinue the morphine IV 2mg and keep the 4mg IV for 

severe prn pain, but decrease the duration to 3 hours


 - I also recomend we add back his morphine IR po (in preparation to discharge 

with pain controlled). Morphine Sulfate 15mg IR PRN every 6 hours (home dose 

was 30mg po)


 - Long discussion with patient on utilizing po pain medicine first. 


3. Persistent Nausea, no Emesis recently:


 - Antiemetics prn


 - Treat contributing problem with intense bowel regimen





4. Decreased PO Intake, Weight Loss: 


 - Secondary to constipation, Thrush, and nausea


 - Add marinol to help increase appetite and treat underlying problem of 

Constipation and THrush





5. Constipation: Secondary to increased lymphadenopathy in abdominal area with 

increased need for narcotic pain medicine


 - Increase bowel regime, added intense bowel regimen


 - Abdomen is soft, Positive Flatulence, Positive Bowel Sounds


 - Senna S 2 tabs twice a day and Miralax daily





6. Oral Thrush: 


 - Add Nystatin





DISPO Plan - Is to control pain for further outpatient testing, he will need 

PET scan for confirmation of disease recurrence. 








Thank You for allowing us to care along  with this patient


Sydney Carpenter NP.

## 2018-07-11 NOTE — PN
PROGRESS NOTE



DATE OF SERVICE:

07/11/2018.



PRESENTING COMPLAINT:

Abdominal pain.



INTERVAL HISTORY:

This patient with non-Hodgkin's lymphoma is not doing too well, including bowel

perforation recently.  Pain medication was increased per Oncology.  Patient has had no

bowel movement.  Oral intake is somewhat still compromised.  Lying in bed. Sydney from

Oncology did talk to the patient; they want the patient to have a bowel movement before

he can go home. It will aid in the PET scan.



REVIEW OF SYSTEMS:

Done for constitutional, cardiovascular, GI, pulmonary; relevant findings as above.



CURRENT MEDICATIONS:

1. Nocturnal TPN.

2. The patient was started on Marinol.

3. Morphine sulfate.

4. IV fluids.



PHYSICAL EXAMINATION:

Temperature 97.8, pulse 97, respiration 16, blood pressure 117/73, pulse ox 96% on room

air.

GENERAL APPEARANCE:  Lying in bed, awake, tired-appearing.

EYES: Pupils equal. Conjunctivae pale.

HEENT: External appearance of nose and ears normal. Oral cavity dry.

NECK: JVD not raised.  Mass not palpable.

RESPIRATORY: Effort normal.

LUNGS: Decreased breath sounds.

CARDIOVASCULAR: First and second sounds normal. No edema.

ABDOMEN: Soft. Biliary drain in place.  Some tenderness. No guarding or rigidity. Liver

and spleen not palpable.

PSYCHIATRY:  Alert and oriented x3. Mood and affect a bit anxious-appearing.



INVESTIGATIONS:

Potassium 4.1, BUN 9, creatinine 0.12.  Accu-Cheks are noted.



ASSESSMENT:

1. Increasing abdominal pain, likely from progression of non-Hodgkin's lymphoma, stage

    IV, status post 4 cycles of R-CHOP.

2. Normocytic anemia from underlying lymphoma.

3. Clinical dehydration from elevated BUN.

4. Mild protein-calorie malnutrition.

5. Severe constipation, likely a side effect of pain medications.



PLAN:

Pain medication was increased by Oncology.  Marinol was added.  Will add Metamucil for

bulk forming.





MMODL / IJN: [id number] / Job#: [id number]

## 2018-07-11 NOTE — P.PN
Subjective


Progress Note Date: 07/11/18





58-year-old  male with a very extensive past medical history.  In 

September 2017 he was having abdominal pain and had extensive workup performed 

including a gastric resection with a Yisel-en-Y procedure.  Patient continue to 

have ongoing abdominal pain and follow-up imaging studies reveal evidence of 

extensive lymphadenopathy and when this was biopsy was found to be B-cell 

lymphoma.  The patient was initiated to 4 cycles of R CHOP chemotherapy.  First 

3 were well-tolerated however after the fourth cycle of chemotherapy he 

developed a bowel perforation and required an extensive course of treatment.  

The surgeons at the tertiary center were unable to perform a surgical 

intervention and percutaneous drainage was performed.  He does have a biliary 

drain remains in place at this point in time.  He has had significant weight 

loss and does maintain nutritional support via nighttime TPN.  The patient now 

has developed increasing abdominal pain.  Was seen in the oncology office 

without evidence of obstruction was given some hydration and sent home.  

However the pain increased greatly and he presented to the emergency center and 

has been admitted.  Infectious diseases consultation requested regarding the 

possibility of positive blood cultures.  The patient remains miserable with 

ongoing abdominal pain.  He has a fentanyl patch in place and is receiving 

intravenous narcotic relating that his pain is relieved only for a short period 

of time after each dose.  With his abdominal pain he has very little appetite 

but is denying nausea or emesis.  He has had no hematemesis or melena.  He is 

not having significant fevers, chills or rigors but does just feel very poorly 

overall.


07/11/2018 patient continues to have severe pain mostly in his back at this 

point in time.  He has been seen by his oncologist in some alterations of his 

pain medication it occurred and he is somewhat more comfortable this evening 

but is still rather miserable.  I do assist him to get into the chair to try to 

relieve some of his discomfort.





Objective





- Vital Signs


Vital signs: 


 Vital Signs











Temp  97.8 F   07/11/18 15:14


 


Pulse  97   07/11/18 16:00


 


Resp  16   07/11/18 16:00


 


BP  117/83   07/11/18 15:14


 


Pulse Ox  96   07/11/18 15:14








 Intake & Output











 07/11/18 07/11/18 07/12/18





 06:59 18:59 06:59


 


Intake Total 420 60 0


 


Output Total 200 800 


 


Balance 220 -740 0


 


Weight 68 kg  


 


Intake:   


 


  Intake, IV Titration 300  





  Amount   


 


    Amino Acid 5%-D15w+Lytes* 120  





    E* 1,000 ml @ 95 mls/hr   





    IV .BY DURATION [facility] Rx#:   





    [id number]   


 


    Fat Emulsion 20% 250 ml 80  





    In Empty Bag 1 bag @ 21   





    mls/hr IV Q24H [facility] Rx#:   





    [id number]   


 


    Magnesium Sulfate-D5w Pmx 100  





    1 gm In Dextrose/Water 1   





    100ml.bag @ 100 mls/hr   





    IVPB Q1H [facility] Rx#:   





    [id number]   


 


  Oral 120 60 0


 


Output:   


 


  Urine 200 800 


 


Other:   


 


  Voiding Method Urinal Urinal 


 


  # Voids 4  














- Exam





58-year-old  male who is underweight and chronically ill complains of 

ongoing abdominal pain


HEENT: Anicteric conjunctiva are pink and moist nasal mucosa grossly intact 

without significant lesions, there is no thrush.  Dentition is poor for age


Neck: The neck is supple without significant lymphadenopathy or thyromegaly.


Lungs: Good bilateral air entry without significant crackles or wheezing.  

There is no significant bronchial sounds.  There is no egophony or dullness.


Heart: Regular rate and rhythm with an audible S1-S2, no S3 no S4.  There is no 

significant murmur click or rub, PMI was nondisplaced.


Abdomen: Positive bowel sounds soft with some diffuse tenderness, the 

percutaneous drainage of biliary tract his ongoing drainage, splenomegaly is 

palpated, no other abdominal masses noted.  There was no guarding or rebound.  

The abdomen is not rigid


Extremities: The upper extremities have excellent pulses they are symmetric, no 

significant petechiae or telangiectasia.  No splinter hemorrhages were noted.  

The lower extremities are free from significant edema.  The peripheral pulses 

were 2+ and symmetric.  PICC line in place right upper extremity without 

difficulties


Neuro: Awake alert oriented to person place and time.  There are no acute new 

gross focal sensory motor deficits.











- Labs


CBC & Chem 7: 


 07/10/18 08:24





 07/11/18 06:55


Labs: 


 Abnormal Lab Results - Last 24 Hours (Table)











  07/11/18 07/11/18 07/11/18 Range/Units





  06:55 07:13 11:51 


 


Sodium  135 L    (137-145)  mmol/L


 


Chloride  97 L    ()  mmol/L


 


Creatinine  0.42 L    (0.66-1.25)  mg/dL


 


Glucose  123 H    (74-99)  mg/dL


 


POC Glucose (mg/dL)   133 H  132 H  (75-99)  mg/dL














  07/11/18 07/11/18 Range/Units





  17:15 20:10 


 


Sodium    (137-145)  mmol/L


 


Chloride    ()  mmol/L


 


Creatinine    (0.66-1.25)  mg/dL


 


Glucose    (74-99)  mg/dL


 


POC Glucose (mg/dL)  124 H  136 H  (75-99)  mg/dL








 Microbiology - Last 24 Hours (Table)











 07/09/18 09:00 Blood Culture Gram Stain - Final





 Blood Blood Culture - Final





    Staphylococcus epidermidis








 Laboratory Results











WBC  3.0 k/uL (3.8-10.6)  L  07/10/18  08:24    


 


RBC  2.81 m/uL (4.30-5.90)  L  07/10/18  08:24    


 


Hgb  8.1 gm/dL (13.0-17.5)  L  07/10/18  08:24    


 


Hct  25.1 % (39.0-53.0)  L  07/10/18  08:24    


 


MCV  89.0 fL (80.0-100.0)   07/10/18  08:24    


 


MCH  28.9 pg (25.0-35.0)   07/10/18  08:24    


 


MCHC  32.5 g/dL (31.0-37.0)   07/10/18  08:24    


 


RDW  13.9 % (11.5-15.5)   07/10/18  08:24    


 


Plt Count  220 k/uL (150-450)   07/10/18  08:24    


 


Neutrophils %  79 %  07/10/18  08:24    


 


Lymphocytes %  6 %  07/10/18  08:24    


 


Monocytes %  11 %  07/10/18  08:24    


 


Eosinophils %  2 %  07/10/18  08:24    


 


Basophils %  0 %  07/10/18  08:24    


 


Neutrophils #  2.4 k/uL (1.3-7.7)   07/10/18  08:24    


 


Lymphocytes #  0.2 k/uL (1.0-4.8)  L  07/10/18  08:24    


 


Monocytes #  0.3 k/uL (0-1.0)   07/10/18  08:24    


 


Eosinophils #  0.1 k/uL (0-0.7)   07/10/18  08:24    


 


Basophils #  0.0 k/uL (0-0.2)   07/10/18  08:24    


 


Manual Slide Review  Performed   07/10/18  08:24    


 


Hypochromasia (manual)  Present   07/10/18  08:24    


 


Poikilocytosis (manual  Present   07/10/18  08:24    


 


Anisocytosis (manual)  Present   07/10/18  08:24    


 


Retic Count  2.6 % (0.5-2.0)  H  07/09/18  20:03    


 


Sodium  135 mmol/L (137-145)  L  07/11/18  06:55    


 


Potassium  4.1 mmol/L (3.5-5.1)   07/11/18  06:55    


 


Chloride  97 mmol/L ()  L  07/11/18  06:55    


 


Carbon Dioxide  28 mmol/L (22-30)   07/11/18  06:55    


 


Anion Gap  10 mmol/L  07/11/18  06:55    


 


BUN  9 mg/dL (9-20)   07/11/18  06:55    


 


Creatinine  0.42 mg/dL (0.66-1.25)  L  07/11/18  06:55    


 


Est GFR (CKD-EPI)AfAm  >90  (>60 ml/min/1.73 sqM)   07/11/18  06:55    


 


Est GFR (CKD-EPI)NonAf  >90  (>60 ml/min/1.73 sqM)   07/11/18  06:55    


 


Glucose  123 mg/dL (74-99)  H  07/11/18  06:55    


 


POC Glucose (mg/dL)  136 mg/dL (75-99)  H  07/11/18  20:10    


 


POC Glu Operater ID  Li Daniels   07/11/18  20:10    


 


Estimated Ave Glu mg/dL  108   07/09/18  09:00    


 


Hemoglobin A1c  5.4 % (4.0-6.0)   07/09/18  09:00    


 


Plasma Lactic Acid Len  0.5 mmol/L (0.7-2.0)  L  07/09/18  09:00    


 


Uric Acid  1.5 mg/dL (3.5-8.5)  L  07/09/18  09:00    


 


Calcium  8.6 mg/dL (8.4-10.2)   07/11/18  06:55    


 


Ionized Calcium Bruno  4.9 mg/dL (4.5-5.3)   07/11/18  06:55    


 


Phosphorus  4.3 mg/dL (2.5-4.5)   07/11/18  06:55    


 


Magnesium  1.7 mg/dL (1.6-2.3)   07/11/18  06:55    


 


Iron  19 ug/dL ()  L  07/09/18  09:00    


 


TIBC  210 ug/dL (228-460)  L  07/09/18  09:00    


 


Iron Saturation  9.05   (15.00-50.00)  L  07/09/18  09:00    


 


Ferritin  498.2 ng/mL (22.0-322.0)  H  07/09/18  09:00    


 


Total Bilirubin  1.0 mg/dL (0.2-1.3)   07/09/18  09:00    


 


AST  25 U/L (17-59)   07/09/18  09:00    


 


ALT  34 U/L (21-72)   07/09/18  09:00    


 


Alkaline Phosphatase  151 U/L ()  H  07/09/18  09:00    


 


Lactate Dehydrogenase  540 U/L (313-618)   07/09/18  09:00    


 


Total Protein  5.6 g/dL (6.3-8.2)  L  07/09/18  09:00    


 


Albumin  2.8 g/dL (3.5-5.0)  L  07/10/18  13:19    


 


Triglycerides  110 mg/dL (<150)   07/10/18  13:19    


 


Amylase  <30 U/L ()  L  07/09/18  09:00    


 


Lipase  39 U/L ()   07/09/18  09:00    


 


Vitamin B12  826.0 pg/mL (200.0-944.0)   07/09/18  09:00    


 


Urine Color  Yellow   07/09/18  09:26    


 


Urine Appearance  Clear  (Clear)   07/09/18  09:26    


 


Urine pH  5.5  (5.0-8.0)   07/09/18  09:26    


 


Ur Specific Gravity  1.022  (1.001-1.035)   07/09/18  09:26    


 


Urine Protein  Negative  (Negative)   07/09/18  09:26    


 


Urine Glucose (UA)  Negative  (Negative)   07/09/18  09:26    


 


Urine Ketones  Negative  (Negative)   07/09/18  09:26    


 


Urine Blood  Negative  (Negative)   07/09/18  09:26    


 


Urine Nitrite  Negative  (Negative)   07/09/18  09:26    


 


Urine Bilirubin  Negative  (Negative)   07/09/18  09:26    


 


Urine Urobilinogen  <2.0 mg/dL (<2.0)   07/09/18  09:26    


 


Ur Leukocyte Esterase  Negative  (Negative)   07/09/18  09:26    


 


Vancomycin Trough  15.1 ug/mL  07/11/18  06:55    








 Microbiology





07/09/18 09:00   Blood   Blood Culture Gram Stain - Final


07/09/18 09:00   Blood   Blood Culture - Final


                            Staphylococcus epidermidis


07/09/18 09:26   Urine,Clean Catch   Urine Culture - Final


07/09/18 09:00   Blood   Blood Culture - Final











Assessment and Plan


(1) B-cell lymphoma


Current Visit: Yes   Status: Acute   Code(s): C85.10 - UNSPECIFIED B-CELL 

LYMPHOMA, UNSPECIFIED SITE   SNOMED Code(s): 438942646


   





(2) Gram-positive cocci bacteremia


Narrative/Plan: 


58-year-old  male with history of B-cell lymphoma presents to hospital 

with increasing abdominal pain.  Patient has had computed tomography scan that 

shows evidence of increasing size of interabdominal mass and lymphadenopathy 

and has been seen by surgery.  No plans for surgical intervention at this time.

  His TPN continues and diet was advised by surgery.  Patient continues to have 

abdominal pain and is being followed by oncology in attempts to help improve 

his pain protocol.


There is evidence of a positive blood culture from the ER admission and the 

laboratory is now identifying this as a coagulase-negative staph, the cultures 

were obtained from the same moment and represent only 1 blood culture.  The 

patient is without fever or chills and has not recently received any 

chemotherapy.  He is somewhat immunocompromised because of his ongoing B-cell 

lymphoma, however appears to be a contamination.  If he becomes febrile follow 

blood cultures would be prudent.  His PICC line is greater than 6 months old 

and would defer to his oncologist's to the potential for a PICC line exchange 

given his ongoing TPN needs.


He has relative leukopenia but not absolute neutropenia, he has no fever and is 

not requiring antibiotic therapy at this time, computed tomography scan does 

not reveal evidence of abscess but of underlying worsening disease state.


07/11/2018 laboratory has now verified that the culture is coagulase-negative 

staph, and with negative fever the patient feeling better except for his pain 

it is highly unlikely that he has an infection related to his PICC line.  Does 

not need further antibiotic therapy at this time.  Requesting if the oncologist 

believes the PICC line should be changed given the fact that it is no more than 

6 months old.


Current Visit: Yes   Status: Acute   Code(s): R78.81 - BACTEREMIA   SNOMED Code(

s): 465003636099


   





(3) Abdominal pain


Current Visit: No   Status: Acute   Code(s): R10.9 - UNSPECIFIED ABDOMINAL PAIN

   SNOMED Code(s): 86805396


   





(4) Leukopenia


Current Visit: Yes   Status: Acute   Code(s): D72.819 - DECREASED WHITE BLOOD 

CELL COUNT, UNSPECIFIED   SNOMED Code(s): 53658014

## 2018-07-12 LAB
ANION GAP SERPL CALC-SCNC: 9 MMOL/L
BASOPHILS # BLD AUTO: 0 K/UL (ref 0–0.2)
BASOPHILS NFR BLD AUTO: 0 %
BUN SERPL-SCNC: 11 MG/DL (ref 9–20)
CALCIUM SPEC-MCNC: 8.8 MG/DL (ref 8.4–10.2)
CHLORIDE SERPL-SCNC: 99 MMOL/L (ref 98–107)
CO2 SERPL-SCNC: 30 MMOL/L (ref 22–30)
EOSINOPHIL # BLD AUTO: 0.1 K/UL (ref 0–0.7)
EOSINOPHIL NFR BLD AUTO: 4 %
ERYTHROCYTE [DISTWIDTH] IN BLOOD BY AUTOMATED COUNT: 2.61 M/UL (ref 4.3–5.9)
ERYTHROCYTE [DISTWIDTH] IN BLOOD: 14.1 % (ref 11.5–15.5)
GLUCOSE BLD-MCNC: 132 MG/DL (ref 75–99)
GLUCOSE BLD-MCNC: 137 MG/DL (ref 75–99)
GLUCOSE BLD-MCNC: 144 MG/DL (ref 75–99)
GLUCOSE BLD-MCNC: 149 MG/DL (ref 75–99)
GLUCOSE SERPL-MCNC: 132 MG/DL (ref 74–99)
HCT VFR BLD AUTO: 23.5 % (ref 39–53)
HGB BLD-MCNC: 7.5 GM/DL (ref 13–17.5)
LYMPHOCYTES # SPEC AUTO: 0.1 K/UL (ref 1–4.8)
LYMPHOCYTES NFR SPEC AUTO: 7 %
MAGNESIUM SPEC-SCNC: 1.8 MG/DL (ref 1.6–2.3)
MCH RBC QN AUTO: 28.6 PG (ref 25–35)
MCHC RBC AUTO-ENTMCNC: 31.7 G/DL (ref 31–37)
MCV RBC AUTO: 90.2 FL (ref 80–100)
MONOCYTES # BLD AUTO: 0.2 K/UL (ref 0–1)
MONOCYTES NFR BLD AUTO: 9 %
NEUTROPHILS # BLD AUTO: 1.7 K/UL (ref 1.3–7.7)
NEUTROPHILS NFR BLD AUTO: 78 %
PLATELET # BLD AUTO: 239 K/UL (ref 150–450)
POTASSIUM SERPL-SCNC: 4.1 MMOL/L (ref 3.5–5.1)
SODIUM SERPL-SCNC: 138 MMOL/L (ref 137–145)
WBC # BLD AUTO: 2.1 K/UL (ref 3.8–10.6)

## 2018-07-12 RX ADMIN — LEUCINE, PHENYLALANINE, LYSINE, METHIONINE, ISOLEUCINE, VALINE, HISTIDINE, THREONINE, TRYPTOPHAN, ALANINE, GLYCINE, ARGININE, PROLINE, SERINE, TYROSINE, SODIUM ACETATE, DIBASIC POTASSIUM PHOSPHATE, MAGNESIUM CHLORIDE, SODIUM CHLORIDE, CALCIUM CHLORIDE, DEXTROSE SCH MLS/HR
365; 280; 290; 200; 300; 290; 240; 210; 90; 1035; 515; 575; 340; 250; 20; 340; 261; 51; 59; 33; 15 INJECTION INTRAVENOUS at 05:18

## 2018-07-12 RX ADMIN — NYSTATIN SCH UNIT: 100000 SUSPENSION ORAL at 21:05

## 2018-07-12 RX ADMIN — SODIUM PHOSPHATE, MONOBASIC, MONOHYDRATE SCH MLS/HR: 276; 142 INJECTION, SOLUTION INTRAVENOUS at 18:02

## 2018-07-12 RX ADMIN — LEUCINE, PHENYLALANINE, LYSINE, METHIONINE, ISOLEUCINE, VALINE, HISTIDINE, THREONINE, TRYPTOPHAN, ALANINE, GLYCINE, ARGININE, PROLINE, SERINE, TYROSINE, SODIUM ACETATE, DIBASIC POTASSIUM PHOSPHATE, MAGNESIUM CHLORIDE, SODIUM CHLORIDE, CALCIUM CHLORIDE, DEXTROSE SCH MLS/HR
365; 280; 290; 200; 300; 290; 240; 210; 90; 1035; 515; 575; 340; 250; 20; 340; 261; 51; 59; 33; 15 INJECTION INTRAVENOUS at 07:40

## 2018-07-12 RX ADMIN — SENNOSIDES SCH MG: 8.6 TABLET, FILM COATED ORAL at 07:36

## 2018-07-12 RX ADMIN — NYSTATIN SCH UNIT: 100000 SUSPENSION ORAL at 07:36

## 2018-07-12 RX ADMIN — MAGNESIUM SULFATE IN DEXTROSE SCH MLS/HR: 10 INJECTION, SOLUTION INTRAVENOUS at 14:29

## 2018-07-12 RX ADMIN — PSYLLIUM HUSK SCH GM: 3.4 POWDER ORAL at 21:04

## 2018-07-12 RX ADMIN — DRONABINOL SCH MG: 2.5 CAPSULE ORAL at 22:36

## 2018-07-12 RX ADMIN — SERTRALINE HYDROCHLORIDE SCH MG: 100 TABLET ORAL at 07:36

## 2018-07-12 RX ADMIN — NYSTATIN SCH: 100000 SUSPENSION ORAL at 12:32

## 2018-07-12 RX ADMIN — PSYLLIUM HUSK SCH GM: 3.4 POWDER ORAL at 02:28

## 2018-07-12 RX ADMIN — PSYLLIUM HUSK SCH GM: 3.4 POWDER ORAL at 07:37

## 2018-07-12 RX ADMIN — METOCLOPRAMIDE SCH MG: 5 INJECTION, SOLUTION INTRAMUSCULAR; INTRAVENOUS at 18:07

## 2018-07-12 RX ADMIN — SENNOSIDES SCH MG: 8.6 TABLET, FILM COATED ORAL at 21:04

## 2018-07-12 RX ADMIN — MAGNESIUM SULFATE IN DEXTROSE SCH MLS/HR: 10 INJECTION, SOLUTION INTRAVENOUS at 12:43

## 2018-07-12 RX ADMIN — INSULIN ASPART SCH: 100 INJECTION, SOLUTION INTRAVENOUS; SUBCUTANEOUS at 07:42

## 2018-07-12 RX ADMIN — METOCLOPRAMIDE SCH MG: 5 INJECTION, SOLUTION INTRAMUSCULAR; INTRAVENOUS at 14:38

## 2018-07-12 RX ADMIN — INSULIN ASPART SCH: 100 INJECTION, SOLUTION INTRAVENOUS; SUBCUTANEOUS at 13:03

## 2018-07-12 RX ADMIN — CEFAZOLIN SCH MLS/HR: 330 INJECTION, POWDER, FOR SOLUTION INTRAMUSCULAR; INTRAVENOUS at 18:04

## 2018-07-12 RX ADMIN — POLYETHYLENE GLYCOL 3350 SCH GM: 17 POWDER, FOR SOLUTION ORAL at 07:36

## 2018-07-12 RX ADMIN — IBUPROFEN PRN MG: 400 TABLET, FILM COATED ORAL at 22:30

## 2018-07-12 RX ADMIN — PROCHLORPERAZINE MALEATE PRN MG: 10 TABLET, FILM COATED ORAL at 09:50

## 2018-07-12 RX ADMIN — METOCLOPRAMIDE SCH: 5 INJECTION, SOLUTION INTRAMUSCULAR; INTRAVENOUS at 23:56

## 2018-07-12 RX ADMIN — MORPHINE SULFATE PRN MG: 15 TABLET ORAL at 14:38

## 2018-07-12 RX ADMIN — INSULIN ASPART SCH: 100 INJECTION, SOLUTION INTRAVENOUS; SUBCUTANEOUS at 16:55

## 2018-07-12 RX ADMIN — MORPHINE SULFATE PRN MG: 15 TABLET ORAL at 05:32

## 2018-07-12 RX ADMIN — SOYBEAN OIL SCH MLS/HR: 20 INJECTION, SOLUTION INTRAVENOUS at 18:03

## 2018-07-12 RX ADMIN — MORPHINE SULFATE PRN MG: 15 TABLET ORAL at 09:57

## 2018-07-12 RX ADMIN — ONDANSETRON PRN MG: 2 INJECTION INTRAMUSCULAR; INTRAVENOUS at 05:37

## 2018-07-12 RX ADMIN — MORPHINE SULFATE PRN MG: 15 TABLET ORAL at 20:59

## 2018-07-12 RX ADMIN — NYSTATIN SCH: 100000 SUSPENSION ORAL at 18:07

## 2018-07-12 RX ADMIN — INSULIN ASPART SCH UNIT: 100 INJECTION, SOLUTION INTRAVENOUS; SUBCUTANEOUS at 21:04

## 2018-07-12 RX ADMIN — CEFAZOLIN SCH MLS/HR: 330 INJECTION, POWDER, FOR SOLUTION INTRAMUSCULAR; INTRAVENOUS at 07:42

## 2018-07-12 NOTE — P.PN
Subjective


Progress Note Date: 07/12/18





58-year-old  male with a very extensive past medical history.  In 

September 2017 he was having abdominal pain and had extensive workup performed 

including a gastric resection with a Yisel-en-Y procedure.  Patient continue to 

have ongoing abdominal pain and follow-up imaging studies reveal evidence of 

extensive lymphadenopathy and when this was biopsy was found to be B-cell 

lymphoma.  The patient was initiated to 4 cycles of R CHOP chemotherapy.  First 

3 were well-tolerated however after the fourth cycle of chemotherapy he 

developed a bowel perforation and required an extensive course of treatment.  

The surgeons at the tertiary center were unable to perform a surgical 

intervention and percutaneous drainage was performed.  He does have a biliary 

drain remains in place at this point in time.  He has had significant weight 

loss and does maintain nutritional support via nighttime TPN.  The patient now 

has developed increasing abdominal pain.  Was seen in the oncology office 

without evidence of obstruction waHas s given some hydration and sent home.  

However the pain increased greatly and he presented to the emergency center and 

has been admitted.  Infectious diseases consultation requested regarding the 

possibility of positive blood cultures.  The patient remains miserable with 

ongoing abdominal pain.  He has a fentanyl patch in place and is receiving 

intravenous narcotic relating that his pain is relieved only for a short period 

of time after each dose.  With his abdominal pain he has very little appetite 

but is denying nausea or emesis.  He has had no hematemesis or melena.  He is 

not having significant fevers, chills or rigors but does just feel very poorly 

overall.


07/11/2018 patient continues to have severe pain mostly in his back at this 

point in time.  He has been seen by his oncologist in some alterations of his 

pain medication it occurred and he is somewhat more comfortable this evening 

but is still rather miserable.  I do assist him to get into the chair to try to 

relieve some of his discomfort.


7/12/2018 pain is slightly better but still having nausea, not fully controlled 

with current meds, has difficulty with constipation, current therapy worsening 

nausea.





Objective





- Vital Signs


Vital signs: 


 Vital Signs











Temp  97.9 F   07/12/18 14:50


 


Pulse  101 H  07/12/18 18:58


 


Resp  18   07/12/18 18:58


 


BP  133/79   07/12/18 18:58


 


Pulse Ox  97   07/12/18 18:58








 Intake & Output











 07/12/18 07/12/18 07/13/18





 06:59 18:59 06:59


 


Intake Total 3401 60 


 


Output Total 2000  


 


Balance 1401 60 


 


Weight 67.5 kg  


 


Intake:   


 


  Intake, IV Titration 2951  





  Amount   


 


    Fat Emulsion 20% 250 ml 250  





    In Empty Bag 1 bag @ 21   





    mls/hr IV Q24H [facility] Rx#:   





    [id number]   


 


    Mvi, Adult No.4 with Vit 2211  





    K 10 ml Trace (Conc-1Ml/   





    Dose) 1 ml In Amino Acid   





    5%-D15w+Lytes*E* 1,000 ml   





    @ 95 mls/hr IV .BY   





    DURATION [facility] Rx#:   





    [id number]   


 


    Sodium Chloride 0.9% 1, 240  





    000 ml @ 120 mls/hr IV .   





    Q8H20M [facility] Rx#:[id number]   


 


    Vancomycin 1,250 mg In 250  





    Sodium Chloride 0.9% 250   





    ml @ 125 mls/hr IVPB Q8H   





    [facility] Rx#:[id number]   


 


  Oral 450 60 


 


Output:   


 


  Urine 2000  


 


Other:   


 


  Voiding Method Urinal Urinal 


 


  # Voids  3 














- Exam





58-year-old  male who is underweight and chronically ill complains of 

ongoing abdominal pain


HEENT: Anicteric conjunctiva are pink and moist nasal mucosa grossly intact 

without significant lesions, there is no thrush.  Dentition is poor for age


Neck: The neck is supple without significant lymphadenopathy or thyromegaly.


Lungs: Good bilateral air entry without significant crackles or wheezing.  

There is no significant bronchial sounds.  There is no egophony or dullness.


Heart: Regular rate and rhythm with an audible S1-S2, no S3 no S4.  There is no 

significant murmur click or rub, PMI was nondisplaced.


Abdomen: Positive bowel sounds soft with some diffuse tenderness, the 

percutaneous drainage of biliary tract his ongoing drainage, splenomegaly is 

palpated, no other abdominal masses noted.  There was no guarding or rebound.  

The abdomen is not rigid


Extremities: The upper extremities have excellent pulses they are symmetric, no 

significant petechiae or telangiectasia.  No splinter hemorrhages were noted.  

The lower extremities are free from significant edema.  The peripheral pulses 

were 2+ and symmetric.  PICC line in place right upper extremity without 

difficulties


Neuro: Awake alert oriented to person place and time.  There are no acute new 

gross focal sensory motor deficits.











- Labs


CBC & Chem 7: 


 07/12/18 07:27





 07/12/18 07:27


Labs: 


 Abnormal Lab Results - Last 24 Hours (Table)











  07/12/18 07/12/18 07/12/18 Range/Units





  07:03 07:27 07:27 


 


WBC    2.1 L  (3.8-10.6)  k/uL


 


RBC    2.61 L  (4.30-5.90)  m/uL


 


Hgb    7.5 L  (13.0-17.5)  gm/dL


 


Hct    23.5 L  (39.0-53.0)  %


 


Lymphocytes #    0.1 L  (1.0-4.8)  k/uL


 


Creatinine   0.41 L   (0.66-1.25)  mg/dL


 


Glucose   132 H   (74-99)  mg/dL


 


POC Glucose (mg/dL)  149 H    (75-99)  mg/dL


 


Phosphorus   4.8 H   (2.5-4.5)  mg/dL














  07/12/18 07/12/18 07/12/18 Range/Units





  11:11 16:54 20:32 


 


WBC     (3.8-10.6)  k/uL


 


RBC     (4.30-5.90)  m/uL


 


Hgb     (13.0-17.5)  gm/dL


 


Hct     (39.0-53.0)  %


 


Lymphocytes #     (1.0-4.8)  k/uL


 


Creatinine     (0.66-1.25)  mg/dL


 


Glucose     (74-99)  mg/dL


 


POC Glucose (mg/dL)  144 H  132 H  137 H  (75-99)  mg/dL


 


Phosphorus     (2.5-4.5)  mg/dL








 Laboratory Results











WBC  2.1 k/uL (3.8-10.6)  L  07/12/18  07:27    


 


RBC  2.61 m/uL (4.30-5.90)  L  07/12/18  07:27    


 


Hgb  7.5 gm/dL (13.0-17.5)  L  07/12/18  07:27    


 


Hct  23.5 % (39.0-53.0)  L  07/12/18  07:27    


 


MCV  90.2 fL (80.0-100.0)   07/12/18  07:27    


 


MCH  28.6 pg (25.0-35.0)   07/12/18  07:27    


 


MCHC  31.7 g/dL (31.0-37.0)   07/12/18  07:27    


 


RDW  14.1 % (11.5-15.5)   07/12/18  07:27    


 


Plt Count  239 k/uL (150-450)   07/12/18  07:27    


 


Neutrophils %  78 %  07/12/18  07:27    


 


Lymphocytes %  7 %  07/12/18  07:27    


 


Monocytes %  9 %  07/12/18  07:27    


 


Eosinophils %  4 %  07/12/18  07:27    


 


Basophils %  0 %  07/12/18  07:27    


 


Neutrophils #  1.7 k/uL (1.3-7.7)   07/12/18  07:27    


 


Lymphocytes #  0.1 k/uL (1.0-4.8)  L  07/12/18  07:27    


 


Monocytes #  0.2 k/uL (0-1.0)   07/12/18  07:27    


 


Eosinophils #  0.1 k/uL (0-0.7)   07/12/18  07:27    


 


Basophils #  0.0 k/uL (0-0.2)   07/12/18  07:27    


 


Manual Slide Review  Performed   07/10/18  08:24    


 


Hypochromasia  Slight   07/12/18  07:27    


 


Hypochromasia (manual)  Present   07/10/18  08:24    


 


Poikilocytosis (manual  Present   07/10/18  08:24    


 


Anisocytosis (manual)  Present   07/10/18  08:24    


 


Retic Count  2.6 % (0.5-2.0)  H  07/09/18  20:03    


 


Sodium  138 mmol/L (137-145)   07/12/18  07:27    


 


Potassium  4.1 mmol/L (3.5-5.1)   07/12/18  07:27    


 


Chloride  99 mmol/L ()   07/12/18  07:27    


 


Carbon Dioxide  30 mmol/L (22-30)   07/12/18  07:27    


 


Anion Gap  9 mmol/L  07/12/18  07:27    


 


BUN  11 mg/dL (9-20)   07/12/18  07:27    


 


Creatinine  0.41 mg/dL (0.66-1.25)  L  07/12/18  07:27    


 


Est GFR (CKD-EPI)AfAm  >90  (>60 ml/min/1.73 sqM)   07/12/18  07:27    


 


Est GFR (CKD-EPI)NonAf  >90  (>60 ml/min/1.73 sqM)   07/12/18  07:27    


 


Glucose  132 mg/dL (74-99)  H  07/12/18  07:27    


 


POC Glucose (mg/dL)  137 mg/dL (75-99)  H  07/12/18  20:32    


 


POC Glu Operater ID  Li Daniels   07/12/18  20:32    


 


Estimated Ave Glu mg/dL  108   07/09/18  09:00    


 


Hemoglobin A1c  5.4 % (4.0-6.0)   07/09/18  09:00    


 


Plasma Lactic Acid Len  0.5 mmol/L (0.7-2.0)  L  07/09/18  09:00    


 


Uric Acid  1.5 mg/dL (3.5-8.5)  L  07/09/18  09:00    


 


Calcium  8.8 mg/dL (8.4-10.2)   07/12/18  07:27    


 


Ionized Calcium Bruno  4.8 mg/dL (4.5-5.3)   07/12/18  07:27    


 


Phosphorus  4.8 mg/dL (2.5-4.5)  H  07/12/18  07:27    


 


Magnesium  1.8 mg/dL (1.6-2.3)   07/12/18  07:27    


 


Iron  19 ug/dL ()  L  07/09/18  09:00    


 


TIBC  210 ug/dL (228-460)  L  07/09/18  09:00    


 


Iron Saturation  9.05   (15.00-50.00)  L  07/09/18  09:00    


 


Ferritin  498.2 ng/mL (22.0-322.0)  H  07/09/18  09:00    


 


Total Bilirubin  1.0 mg/dL (0.2-1.3)   07/09/18  09:00    


 


AST  25 U/L (17-59)   07/09/18  09:00    


 


ALT  34 U/L (21-72)   07/09/18  09:00    


 


Alkaline Phosphatase  151 U/L ()  H  07/09/18  09:00    


 


Lactate Dehydrogenase  540 U/L (313-618)   07/09/18  09:00    


 


Troponin I  <0.012 ng/mL (0.000-0.034)   07/12/18  19:30    


 


Total Protein  5.6 g/dL (6.3-8.2)  L  07/09/18  09:00    


 


Albumin  2.8 g/dL (3.5-5.0)  L  07/10/18  13:19    


 


Triglycerides  110 mg/dL (<150)   07/10/18  13:19    


 


Amylase  <30 U/L ()  L  07/09/18  09:00    


 


Lipase  39 U/L ()   07/09/18  09:00    


 


Vitamin B12  826.0 pg/mL (200.0-944.0)   07/09/18  09:00    


 


Methylmalonic Acid  0.11 umol/L (<0.40)   07/09/18  20:03    


 


Urine Color  Yellow   07/09/18  09:26    


 


Urine Appearance  Clear  (Clear)   07/09/18  09:26    


 


Urine pH  5.5  (5.0-8.0)   07/09/18  09:26    


 


Ur Specific Gravity  1.022  (1.001-1.035)   07/09/18  09:26    


 


Urine Protein  Negative  (Negative)   07/09/18  09:26    


 


Urine Glucose (UA)  Negative  (Negative)   07/09/18  09:26    


 


Urine Ketones  Negative  (Negative)   07/09/18  09:26    


 


Urine Blood  Negative  (Negative)   07/09/18  09:26    


 


Urine Nitrite  Negative  (Negative)   07/09/18  09:26    


 


Urine Bilirubin  Negative  (Negative)   07/09/18  09:26    


 


Urine Urobilinogen  <2.0 mg/dL (<2.0)   07/09/18  09:26    


 


Ur Leukocyte Esterase  Negative  (Negative)   07/09/18  09:26    


 


Vancomycin Trough  15.1 ug/mL  07/11/18  06:55    








 Microbiology





07/09/18 09:00   Blood   Blood Culture Gram Stain - Final


07/09/18 09:00   Blood   Blood Culture - Final


                            Staphylococcus epidermidis


07/09/18 09:26   Urine,Clean Catch   Urine Culture - Final


07/09/18 09:00   Blood   Blood Culture - Final











Assessment and Plan


(1) B-cell lymphoma


Current Visit: Yes   Status: Acute   Code(s): C85.10 - UNSPECIFIED B-CELL 

LYMPHOMA, UNSPECIFIED SITE   SNOMED Code(s): 486045499


   





(2) Gram-positive cocci bacteremia


Narrative/Plan: 


58-year-old  male with history of B-cell lymphoma presents to hospital 

with increasing abdominal pain.  Patient has had computed tomography scan that 

shows evidence of increasing size of interabdominal mass and lymphadenopathy 

and has been seen by surgery.  No plans for surgical intervention at this time.

  His TPN continues and diet was advised by surgery.  Patient continues to have 

abdominal pain and is being followed by oncology in attempts to help improve 

his pain protocol.


There is evidence of a positive blood culture from the ER admission and the 

laboratory is now identifying this as a coagulase-negative staph, the cultures 

were obtained from the same moment and represent only 1 blood culture.  The 

patient is without fever or chills and has not recently received any 

chemotherapy.  He is somewhat immunocompromised because of his ongoing B-cell 

lymphoma, however appears to be a contamination.  If he becomes febrile follow 

blood cultures would be prudent.  His PICC line is greater than 6 months old 

and would defer to his oncologist's to the potential for a PICC line exchange 

given his ongoing TPN needs.


He has relative leukopenia but not absolute neutropenia, he has no fever and is 

not requiring antibiotic therapy at this time, computed tomography scan does 

not reveal evidence of abscess but of underlying worsening disease state.


07/11/2018 laboratory has now verified that the culture is coagulase-negative 

staph, and with negative fever the patient feeling better except for his pain 

it is highly unlikely that he has an infection related to his PICC line.  Does 

not need further antibiotic therapy at this time.  Requesting if the oncologist 

believes the PICC line should be changed given the fact that it is no more than 

6 months old.


7/12/2018 patient miserable with nausea reglan added to assist with the 

symptom. Pain is better with pain but not overall feeling well.  No evidence of 

bacteremia. No need for antibiotic therapy.


Current Visit: Yes   Status: Acute   Code(s): R78.81 - BACTEREMIA   SNOMED Code(

s): 604812084485


   





(3) Abdominal pain


Current Visit: No   Status: Acute   Code(s): R10.9 - UNSPECIFIED ABDOMINAL PAIN

   SNOMED Code(s): 97183487


   





(4) Leukopenia


Current Visit: Yes   Status: Acute   Code(s): D72.819 - DECREASED WHITE BLOOD 

CELL COUNT, UNSPECIFIED   SNOMED Code(s): 86892462

## 2018-07-12 NOTE — PN
PROGRESS NOTE



DATE OF SERVICE:

7/12/18



PRESENTING COMPLAINT:

Abdominal pain.



INTERVAL HISTORY:

This is a non-Hodgkin lymphoma presents with increasing abdominal pain.  Pain

medications were adjusted.  The patient also given some laxatives, has not had a bowel

movement.  Dr. Vega would rather have the patient on enema that was ordered this

afternoon.  The patient's blood cultures felt to be a contaminant.  No antibiotics.



REVIEW OF SYSTEMS:

Done for constitutional, cardiovascular, GI, pulmonary; relevant findings as above.

The patient did eat some.



PHYSICAL EXAMINATION:

Temperature 97.9, pulse 99, respiratory 20, blood pressure 127/81, pulse 97% on room

air.

GENERAL APPEARANCE:  Lying in bed, awake.

EYES:  Pupils equal. Conjunctivae pale.

HEENT:  External appearance of nose and ears normal. Oral cavity dry.

NECK: JVD not raised.  Mass not palpable.

RESPIRATORY: Effort, lungs decreased breath sounds.

CARDIOVASCULAR: First and second sounds, no edema.

ABDOMEN:  Soft.  Biliary drain in place.  Some tenderness. No guarding or rigidity.

Liver and spleen not palpable.

PSYCHIATRY: Alert and oriented x3.  Mood and affect slightly anxious.



INVESTIGATIONS:

White count 2.1, hemoglobin 7.5, potassium 4.1, BUN 11, creatinine 0.41.



ASSESSMENT:

1. Significant constipation as a side effect of pain medications.

2. Non-Hodgkin's lymphoma, stage IV, status post 4 cycles of R-CHOP likely causing

    increasing abdominal pain.

3. Normocytic anemia from underlying lymphoma.

4. Clinical dehydration from elevated BUN.

5. Mild protein-calorie malnutrition.



PLAN:

Soapsuds enema was ordered by me this afternoon.  Other medication and treatment plan

is to continue.  Care was discussed the patient.  Hoping patient can be discharged

tomorrow.





MMODL / IJN: [id number] / Job#: [id number]

## 2018-07-12 NOTE — P.PN
Subjective


Progress Note Date: 07/12/18


Principal diagnosis: 





B Cell Lymphoma





This a very nice patient who presented in May,2017 with abdominal pain and 

anemia,it was mild abdominal pain,off/on,he had colonoscopy on 08/16/2017 which 

revealed benign sigmoid poly.


He had a CT scan of abdomen/pelvis on 08/30/2017 which reported irregular thick 

fluid collection in upper abdomen suspicious for abscess (upon futher review,

there was mensterci adenopathies noted).


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


On 09/03/2017,he had an EGD ,biopsies taken revealed acute inflammation.


On 09/22/2017,he underwent partial gastrectomy,pathology from gastric specimen 

was benign.


He continued to have abdominal pain and anemia,had repeat CT scan of abdomen on 

11/17/2017 which revealed extensive adenopathies in the mesentery,encasing 

vasculature,measuring up to 3.9x7.3x3.7cm,which progressed compared to prior CT 

scan.


On 11/25/2017,CT scan of chest revealed non specific adenopathies.





Shortly after I saw him for the first time on 11/21/2017,I did discuss his care 

with Dr Carlton but she opted not to proceed with left inguinal node biopsy,

that weekend,he ended up in ER with worsening abdominal pain and obstruction,he 

was transferred to [facility] had biliary drain tube placement.


On 11/27/2017,he had a biopsy of abdominal mass which was positive for high 

grade B cell lymphoma,RICO negative,BCL6 positive,Non germinal center,FISH for 

MYC and BCL6 were negative but positive for BCL2.


Bone marrow biopsy done on 12/01/2017 was negative and he received first cycle 

of RCHOP at [facility] as inpatient.


Repeat CT scan on 01/26/2018 ,revealed significant improvement in his lymphoma,

persistent fluid collection.


He had total of 4 cycles of RCHOP,last one on 02/02/2018


He ended up re admitted to [facility] after cycle#4 with bowel perforation,infection,

had along hospital stay for about 6 weeks and discharged for rehab.





Rock was seen in office of Friday with increasing pain, nausea and vomiting. 

He was unable to eat because he continued to feel nauseated. He did state he 

was having bowel movements at that time. He was clinically appearing 

dehydrated. IV Hydration and antiemetics were given and abdominal flat plate 

ordered. Flat plate failed to show concern for obstruction. He was feeling 

better and discharged home from the office. 





7/9/18 - He presented to [facility] Emergency Department with complaints of the 

pain in his abdomen persisting and worsening. CT scan of Abdomen and Pelvis was 

compared to previous diagnostic imaging at [facility]. This may present recurrence, 

difficult to tell secondary to patients multiple surgeries and complications in 

this area. He is not a surgical candidate through [facility]. 





7/10/18 - Patient seen in follow-up today. Spoke with RN and she stated when 

she went to change his fentanyl patch to a new 25mcg (his home dose per MAR) he 

was wearing 2 - 25mcg fentanyls. She stated the morphine is not helping his 

pain for longer than an hour to keep his pain score lower than 6. When the 

patient and wife was asked about how long he was wearing a total of 50mcg 

fentanyl they stated they went back up to 50mcg 3 days ago because his pain was 

so severe. I further reviewed his ofice chart and inactive medications and he 

was recently stopped on 30mg IR morphine and given Norco. Last month he was 

also maintained on 75mcg fentanyl patches. According to patient and wife Dr. Wilkerson manages his pain medications as an outpatient was trying to wean him 

off his fentanyl and po prn morphine because he didnt need as much for PRN and 

his pain seemed to be well controlled. It appears that the increased pain 

crisis he is having and nausea may also be related to his change in opioid 

management. 





7/11/18 - Patient has been increased back on the pain medication he was on as 

outpatient prior to his increased, uncontrolled pain. His largest complaint is 

mid back sharp pain and nausea, no emesis when seen this am. He has no 

neurological or unilateral weakness to suspect concern for cord or nerve 

involvement in back, although if persists MRI of the back maybe resonable. He 

has not moved bowels since admission, I believe this is largely contributing to 

his nausea. He is passing gas and has bowel sounds present, he is not eating as 

no appetite and food tastes bad. There is oral thrush present on exam. 





7/12/18 - Patient seen in follow-up, still no bowel movement and nauseated, 

although pain is better controlled. He has not been able to tolerate food by 

mouth yet. His bowel regimen has been altered and increased. Abdomen is soft, 

bowel sounds are present and he is passing gas.   





Objective





- Vital Signs


Vital signs: 


 Vital Signs











Temp  97.9 F   07/12/18 14:50


 


Pulse  101 H  07/12/18 18:58


 


Resp  18   07/12/18 18:58


 


BP  133/79   07/12/18 18:58


 


Pulse Ox  97   07/12/18 18:58








 Intake & Output











 07/12/18 07/12/18 07/13/18





 06:59 18:59 06:59


 


Intake Total 3401 60 


 


Output Total 2000  


 


Balance 1401 60 


 


Weight 67.5 kg  


 


Intake:   


 


  Intake, IV Titration 2951  





  Amount   


 


    Fat Emulsion 20% 250 ml 250  





    In Empty Bag 1 bag @ 21   





    mls/hr IV Q24H [facility] Rx#:   





    [id number]   


 


    Mvi, Adult No.4 with Vit 2211  





    K 10 ml Trace (Conc-1Ml/   





    Dose) 1 ml In Amino Acid   





    5%-D15w+Lytes*E* 1,000 ml   





    @ 95 mls/hr IV .BY   





    DURATION [facility] Rx#:   





    [id number]   


 


    Sodium Chloride 0.9% 1, 240  





    000 ml @ 120 mls/hr IV .   





    Q8H20M [facility] Rx#:[id number]   


 


    Vancomycin 1,250 mg In 250  





    Sodium Chloride 0.9% 250   





    ml @ 125 mls/hr IVPB Q8H   





    [facility] Rx#:[id number]   


 


  Oral 450 60 


 


Output:   


 


  Urine 2000  


 


Other:   


 


  Voiding Method Urinal Urinal 


 


  # Voids  3 














- Constitutional


General appearance: Present: cooperative, no acute distress





- EENT


Eyes: Present: EOMI, dentition normal


ENT: Present: NA/AT, normal oropharynx, thrush





- Neck


Details: 





supple, trachea midline


Neck: Present: normal ROM





- Respiratory


Respiratory: bilateral: CTA (no increased effort)





- Cardiovascular


Rhythm: regular


Heart sounds: normal: S1, S2





- Gastrointestinal


General gastrointestinal: Present: decreased bowel sounds, soft





- Integumentary


Integumentary: Present: normal





- Neurologic


Neurologic Comment(s): 





no focal defects


Neurologic: Present: CNII-XII intact





- Musculoskeletal


Musculoskeletal: Present: gait normal, generalized weakness, strength equal 

bilaterally





- Psychiatric


Psychiatric: Present: A&O x's 3, appropriate affect, intact judgment & insight





- Labs


CBC & Chem 7: 


 07/12/18 07:27





 07/12/18 07:27


Labs: 


 Abnormal Lab Results - Last 24 Hours (Table)











  07/11/18 07/12/18 07/12/18 Range/Units





  20:10 07:03 07:27 


 


WBC     (3.8-10.6)  k/uL


 


RBC     (4.30-5.90)  m/uL


 


Hgb     (13.0-17.5)  gm/dL


 


Hct     (39.0-53.0)  %


 


Lymphocytes #     (1.0-4.8)  k/uL


 


Creatinine    0.41 L  (0.66-1.25)  mg/dL


 


Glucose    132 H  (74-99)  mg/dL


 


POC Glucose (mg/dL)  136 H  149 H   (75-99)  mg/dL


 


Phosphorus    4.8 H  (2.5-4.5)  mg/dL














  07/12/18 07/12/18 07/12/18 Range/Units





  07:27 11:11 16:54 


 


WBC  2.1 L    (3.8-10.6)  k/uL


 


RBC  2.61 L    (4.30-5.90)  m/uL


 


Hgb  7.5 L    (13.0-17.5)  gm/dL


 


Hct  23.5 L    (39.0-53.0)  %


 


Lymphocytes #  0.1 L    (1.0-4.8)  k/uL


 


Creatinine     (0.66-1.25)  mg/dL


 


Glucose     (74-99)  mg/dL


 


POC Glucose (mg/dL)   144 H  132 H  (75-99)  mg/dL


 


Phosphorus     (2.5-4.5)  mg/dL














Assessment and Plan


Plan: 





Assessment and Recommendations:





1. High Grade B Cell Lymphoma - Status Post 4 cycles of R-CHOP 


 - After 4th cycle, treatment compromised by bowel perforation and extended 

hospitalization and rehabilitation through [facility]


 - Not current candidate for chemotherapy at this time, so has remained on 

surveillance under the care of Dr. Pardo 


 - CT abd and Pelvis is unclear and possibly concerning for recurrence. I have 

discussed with Dr. Pardo and we feel an outpatient PET scan would be best to 

clarify if recurrent disease has represented. If so, a Biopsy will be ordered 

as outpatient for confirmation of recurrence





2. Pain and nausea unrelieved with outpatient management:


 - Continue current supportive care and symptom control, when symptoms are 

stabilized plan to discharge and follow-up for further diagnostics to 

differentiate recurrent lymphoma versus other


 - Patient was recently on 75mcg of fentanyl and Morphine Sulfate PO IR every 8 

hours PRN, ordered by Dr. Wilkerson who was in the process of weaning him down on 

his pain meds and most recent wean was to fentanyl 25mcg and norco prn. The 

weaning started last month and appears to be associated with his increasing 

pain. He also self-increased his fentanyl patch at home to 50mcg 3 days ago to 

try to see if this would help decrease his discomfort. Morphine 4mg only 

appears to be controlling pain for 1-2 hours. He still appears uncomfortable


 - I have increased his fentanyl patch back to 75mcg (as he has been on 50mcg 

the past three days with no improvements)


 - I recommend we discontinue the morphine IV 2mg and keep the 4mg IV for 

severe prn pain, but decrease the duration to 3 hours


 - I also recomend we add back his morphine IR po (in preparation to discharge 

with pain controlled). Morphine Sulfate 15mg IR PRN every 6 hours (home dose 

was 30mg po)


 - Long discussion with patient on utilizing po pain medicine first. His pain 

is currently tolerated on PO MS and Fentanyl 





3. Persistent Nausea, no Emesis recently:


 - Antiemetics prn


 - Treat contributing problem with intense bowel regimen





4. Decreased PO Intake, Weight Loss: 


 - Secondary to constipation, Thrush, and nausea


 - Add marinol to help increase appetite and treat underlying problem of 

Constipation and THrush





5. Constipation: Secondary to increased lymphadenopathy in abdominal area with 

increased need for narcotic pain medicine


 - Increase bowel regimen, Patient did have soap suds enema today


 - Abdomen is soft, Positive Flatulence, Positive Bowel Sounds


 - Reglan





6. Oral Thrush: 


 - Nystatin swish, re-educated patient refused





7. Normocytic Anemia - Chronic, Secondary to chronic inflammation and lymphoma


 - Monitor CBC, transfuse for hemoglobin less than 7





8. Leukopenia - Trending down, ?lymphoma


 - Monitor CBC and review smear





DISPO Plan - Is to control pain (improved), tolerate po intake, pass BM, he 

will need PET scan for confirmation of disease recurrence. This is scheduled 

Saturday 7/14/18. 








Thank You for allowing us to care along  with this patient


Sydney Carpenter NP.

## 2018-07-12 NOTE — P.PN
Subjective


Progress Note Date: 07/12/18





The patient is a 58-year-old gentleman with a complicated history of 

gastrojejunostomy for gastric gastric fistula.  Secondary to his abdominal 

lymphoma, he developed acute gastric outlet obstruction hence requiring a 

biliary stent placed at [facility].  He has been undergoing chemo therapy with 

minimal improvement of his lymphoma.  He is on TPN.  His wife is concerned 

about his constipation. 





He reports drinking laxatives that has now caused him a stomach ache. His CT 

scan was personally reviewed with him and his wife yesterday showing minimal 

stool burden in the transverse and descending colon hence minimal bowel 

movements are expected.











Objective





- Vital Signs


Vital signs: 


 Vital Signs











Temp  97.8 F   07/12/18 05:00


 


Pulse  95   07/12/18 08:20


 


Resp  16   07/12/18 08:20


 


BP  126/76   07/12/18 05:00


 


Pulse Ox  98   07/12/18 05:00








 Intake & Output











 07/11/18 07/12/18 07/12/18





 18:59 06:59 18:59


 


Intake Total 60 3401 


 


Output Total 800 2000 


 


Balance -740 1401 


 


Weight  67.5 kg 


 


Intake:   


 


  Intake, IV Titration  2951 





  Amount   


 


    Fat Emulsion 20% 250 ml  250 





    In Empty Bag 1 bag @ 21   





    mls/hr IV Q24H [facility] Rx#:   





    [id number]   


 


    Mvi, Adult No.4 with Vit  2211 





    K 10 ml Trace (Conc-1Ml/   





    Dose) 1 ml In Amino Acid   





    5%-D15w+Lytes*E* 1,000 ml   





    @ 95 mls/hr IV .BY   





    DURATION [facility] Rx#:   





    [id number]   


 


    Sodium Chloride 0.9% 1,  240 





    000 ml @ 120 mls/hr IV .   





    Q8H20M [facility] Rx#:[id number]   


 


    Vancomycin 1,250 mg In  250 





    Sodium Chloride 0.9% 250   





    ml @ 125 mls/hr IVPB Q8H   





    [facility] Rx#:[id number]   


 


  Oral 60 450 


 


Output:   


 


  Urine 800 2000 


 


Other:   


 


  Voiding Method Urinal Urinal Urinal














- Exam





GENERAL:  Well developed and in no acute distress. Pleasant.


HEENT:  No sclera icterus. Extraocular movements grossly intact.  Moist buccal 

mucosa. Head is atraumatic, normocephalic. Hears conversational speech. No 

nasal drainage.


NECK:  Supple without lymphadenopathy. No JV distention.


CHEST:  Non-labored respirations and equal bilateral excursions. 


CARDIOVASCULAR:  Regular rate and rhythm.  Palpable 2+ radial pulses.


ABDOMEN: No peritonitis. No diffuse tenderness. Biliary drain patent and 

functioning.


MUSCULOSKELETAL:  No clubbing, cyanosis or edema.


NEUROLOGIC:  No focal or lateralizing signs. 


PSYCH:  Appropriate affect.  Alert and oriented to person, place and time.


SKIN: Good skin turgor.  Well perfused.








- Labs


CBC & Chem 7: 


 07/13/18 07:14





 07/13/18 07:14


Labs: 


 Abnormal Lab Results - Last 24 Hours (Table)











  07/11/18 07/11/18 07/11/18 Range/Units





  11:51 17:15 20:10 


 


Creatinine     (0.66-1.25)  mg/dL


 


Glucose     (74-99)  mg/dL


 


POC Glucose (mg/dL)  132 H  124 H  136 H  (75-99)  mg/dL


 


Phosphorus     (2.5-4.5)  mg/dL














  07/12/18 07/12/18 Range/Units





  07:03 07:27 


 


Creatinine   0.41 L  (0.66-1.25)  mg/dL


 


Glucose   132 H  (74-99)  mg/dL


 


POC Glucose (mg/dL)  149 H   (75-99)  mg/dL


 


Phosphorus   4.8 H  (2.5-4.5)  mg/dL








 Microbiology - Last 24 Hours (Table)











 07/09/18 09:00 Blood Culture Gram Stain - Final





 Blood Blood Culture - Final





    Staphylococcus epidermidis














Assessment and Plan


(1) B-cell lymphoma


Status: Acute   Code(s): C85.10 - UNSPECIFIED B-CELL LYMPHOMA, UNSPECIFIED SITE

   SNOMED Code(s): 150548393


   





(2) Epigastric abdominal pain


Status: Acute   Code(s): R10.13 - EPIGASTRIC PAIN   SNOMED Code(s): 40485293


   





(3) Gastric fistula


Status: Acute   Code(s): K31.6 - FISTULA OF STOMACH AND DUODENUM   SNOMED Code(s

): 085599897


   





(4) Gastric outflow obstruction


Status: Acute   Code(s): K31.1 - ADULT HYPERTROPHIC PYLORIC STENOSIS   SNOMED 

Code(s): 239952049


   





(5) Gastroesophageal reflux


Status: Acute   Code(s): K21.9 - GASTRO-ESOPHAGEAL REFLUX DISEASE WITHOUT 

ESOPHAGITIS   SNOMED Code(s): 638209164


   





(6) S/P bypass gastrojejunostomy


Status: Acute   Code(s): Z98.0 - INTESTINAL BYPASS AND ANASTOMOSIS STATUS   

SNOMED Code(s): 618350586


   





(7) Constipation by delayed colonic transit


Status: Acute   Code(s): K59.01 - SLOW TRANSIT CONSTIPATION   SNOMED Code(s): 

67430762


   


Plan: 








1. Continue TPN


2. No surgical intervention needed at this time.


3. Caution of laxatives with his baseline nausea as this is contraindicated. 


4. Recommend suppositories or enemas.

## 2018-07-13 VITALS
TEMPERATURE: 97.8 F | SYSTOLIC BLOOD PRESSURE: 134 MMHG | RESPIRATION RATE: 16 BRPM | HEART RATE: 92 BPM | DIASTOLIC BLOOD PRESSURE: 84 MMHG

## 2018-07-13 LAB
ANION GAP SERPL CALC-SCNC: 9 MMOL/L
BASOPHILS # BLD AUTO: 0 K/UL (ref 0–0.2)
BASOPHILS NFR BLD AUTO: 0 %
BUN SERPL-SCNC: 12 MG/DL (ref 9–20)
CALCIUM SPEC-MCNC: 9.3 MG/DL (ref 8.4–10.2)
CHLORIDE SERPL-SCNC: 98 MMOL/L (ref 98–107)
CO2 SERPL-SCNC: 29 MMOL/L (ref 22–30)
EOSINOPHIL # BLD AUTO: 0.1 K/UL (ref 0–0.7)
EOSINOPHIL NFR BLD AUTO: 2 %
ERYTHROCYTE [DISTWIDTH] IN BLOOD BY AUTOMATED COUNT: 2.84 M/UL (ref 4.3–5.9)
ERYTHROCYTE [DISTWIDTH] IN BLOOD: 14.3 % (ref 11.5–15.5)
GLUCOSE BLD-MCNC: 105 MG/DL (ref 75–99)
GLUCOSE BLD-MCNC: 136 MG/DL (ref 75–99)
GLUCOSE SERPL-MCNC: 104 MG/DL (ref 74–99)
HCT VFR BLD AUTO: 25.4 % (ref 39–53)
HGB BLD-MCNC: 8.2 GM/DL (ref 13–17.5)
LYMPHOCYTES # SPEC AUTO: 0.3 K/UL (ref 1–4.8)
LYMPHOCYTES NFR SPEC AUTO: 8 %
MAGNESIUM SPEC-SCNC: 1.9 MG/DL (ref 1.6–2.3)
MCH RBC QN AUTO: 28.8 PG (ref 25–35)
MCHC RBC AUTO-ENTMCNC: 32.2 G/DL (ref 31–37)
MCV RBC AUTO: 89.4 FL (ref 80–100)
MONOCYTES # BLD AUTO: 0.3 K/UL (ref 0–1)
MONOCYTES NFR BLD AUTO: 10 %
NEUTROPHILS # BLD AUTO: 2.6 K/UL (ref 1.3–7.7)
NEUTROPHILS NFR BLD AUTO: 79 %
PLATELET # BLD AUTO: 294 K/UL (ref 150–450)
POTASSIUM SERPL-SCNC: 4 MMOL/L (ref 3.5–5.1)
SODIUM SERPL-SCNC: 136 MMOL/L (ref 137–145)
WBC # BLD AUTO: 3.3 K/UL (ref 3.8–10.6)

## 2018-07-13 RX ADMIN — ONDANSETRON PRN MG: 2 INJECTION INTRAMUSCULAR; INTRAVENOUS at 05:47

## 2018-07-13 RX ADMIN — SODIUM PHOSPHATE, MONOBASIC, MONOHYDRATE SCH MLS/HR: 276; 142 INJECTION, SOLUTION INTRAVENOUS at 07:07

## 2018-07-13 RX ADMIN — SENNOSIDES SCH MG: 8.6 TABLET, FILM COATED ORAL at 09:25

## 2018-07-13 RX ADMIN — NYSTATIN SCH UNIT: 100000 SUSPENSION ORAL at 09:26

## 2018-07-13 RX ADMIN — CEFAZOLIN SCH: 330 INJECTION, POWDER, FOR SOLUTION INTRAMUSCULAR; INTRAVENOUS at 13:48

## 2018-07-13 RX ADMIN — IBUPROFEN PRN MG: 400 TABLET, FILM COATED ORAL at 09:24

## 2018-07-13 RX ADMIN — MORPHINE SULFATE PRN MG: 15 TABLET ORAL at 08:09

## 2018-07-13 RX ADMIN — SERTRALINE HYDROCHLORIDE SCH MG: 100 TABLET ORAL at 09:25

## 2018-07-13 RX ADMIN — PSYLLIUM HUSK SCH GM: 3.4 POWDER ORAL at 09:35

## 2018-07-13 RX ADMIN — INSULIN ASPART SCH: 100 INJECTION, SOLUTION INTRAVENOUS; SUBCUTANEOUS at 13:51

## 2018-07-13 RX ADMIN — DRONABINOL SCH MG: 2.5 CAPSULE ORAL at 10:32

## 2018-07-13 RX ADMIN — MORPHINE SULFATE PRN MG: 15 TABLET ORAL at 04:59

## 2018-07-13 RX ADMIN — MORPHINE SULFATE PRN MG: 15 TABLET ORAL at 00:56

## 2018-07-13 RX ADMIN — NYSTATIN SCH UNIT: 100000 SUSPENSION ORAL at 13:54

## 2018-07-13 RX ADMIN — INSULIN ASPART SCH: 100 INJECTION, SOLUTION INTRAVENOUS; SUBCUTANEOUS at 10:04

## 2018-07-13 RX ADMIN — METOCLOPRAMIDE SCH MG: 5 INJECTION, SOLUTION INTRAMUSCULAR; INTRAVENOUS at 05:47

## 2018-07-13 RX ADMIN — MORPHINE SULFATE PRN MG: 15 TABLET ORAL at 14:03

## 2018-07-13 RX ADMIN — METOCLOPRAMIDE SCH MG: 5 INJECTION, SOLUTION INTRAMUSCULAR; INTRAVENOUS at 13:53

## 2018-07-13 RX ADMIN — POLYETHYLENE GLYCOL 3350 SCH GM: 17 POWDER, FOR SOLUTION ORAL at 09:26

## 2018-07-13 NOTE — DS
DISCHARGE SUMMARY



DATE OF ADMISSION:

07/09/2018.



DATE OF DISCHARGE:

07/13/2018



FINAL DIAGNOSES:

1. Acute on chronic abdominal pain, probably from worsening non-Hodgkin's lymphoma,

    stage IV, status post 4 cycles of R-chop.

2. Significant constipation as a side effect of pain medications.

3. Normocytic anemia from underlying lymphoma.

4. Clinical dehydration.

5. Mild moderate calorie malnutrition.

6. Chronic severe pain from non-Hodgkin's lymphoma.



HOSPITAL COURSE:

This patient rather complicated course. Hodgkin's lymphoma, status post gastric surgery

and had a perforated bowel, being treated at [facility].  Presented with increasing

abdominal pain, seen by Dr. Vega.  No acute abdomen was found.  The patient's

abdomen exam was actually rather benign.  The patient has got significant constipation.

Pain medications were adjusted by Oncology team.  The patient was given 2 enemas with

some stool.  The patient is up and about in the hallway with a walker.  The patient

will have a PET scan as an outpatient to determine about further line of treatment.

Care was discussed with the wife today, the patient and also oncology team. Per Dr. Louise,  no antibiotics indicated.



CONSULTATION:

Dr. Louise from infectious Disease, Dr. Robison from Oncology, Dr. Vega from General

surgery.



EXAM:

Lungs decreased breath sounds.  Abdomen soft, mild tenderness.  Biliary drain in place.



Discussion and discharge planning more than 35 minutes.



DISCHARGE MEDICATIONS:

1. Vitamin D2 92448 units on Wednesday.

2. Insulin lispro per protocol.

3. Ativan 1 mg p.o. q.8h p.r.n.

4. Prilosec 20 mg p.o. daily.

5. Zofran 4 mg p.o. t.i.d. p.r.n.

6. Compazine 10 mg mg p.o. q.6h p.r.n.

7. Senna 8.6 p.o. daily.

8. Zoloft 100 mg p.o. daily.

9. Morphine sulfate immediate release 50 mg p.o. q.4 p.r.n.

10.Metamucil 6 grams p.o. daily.

11.Fentanyl patch q72 hours.



FOLLOWUP:

With Sydney Carpenter on July 18, 2018 from Oncology, Dr. Vega on July 17, 2018, Dr. Ibrahima Wilkerson in 3 days.  Start talking _____ done by Sydney Carpenter.



Discharge planning more than 35 minutes. Copy to Dr. Wilkerson.





MMLISANDROL / FLORIDAN: [id number] / Job#: [id number]

## 2018-07-13 NOTE — P.PN
Subjective


Progress Note Date: 07/13/18


Principal diagnosis: 





B Cell Lymphoma





This a very nice patient who presented in May,2017 with abdominal pain and 

anemia,it was mild abdominal pain,off/on,he had colonoscopy on 08/16/2017 which 

revealed benign sigmoid poly.


He had a CT scan of abdomen/pelvis on 08/30/2017 which reported irregular thick 

fluid collection in upper abdomen suspicious for abscess (upon futher review,

there was mensterci adenopathies noted).


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


On 09/03/2017,he had an EGD ,biopsies taken revealed acute inflammation.


On 09/22/2017,he underwent partial gastrectomy,pathology from gastric specimen 

was benign.


He continued to have abdominal pain and anemia,had repeat CT scan of abdomen on 

11/17/2017 which revealed extensive adenopathies in the mesentery,encasing 

vasculature,measuring up to 3.9x7.3x3.7cm,which progressed compared to prior CT 

scan.


On 11/25/2017,CT scan of chest revealed non specific adenopathies.





Shortly after I saw him for the first time on 11/21/2017,I did discuss his care 

with Dr Carlton but she opted not to proceed with left inguinal node biopsy,

that weekend,he ended up in ER with worsening abdominal pain and obstruction,he 

was transferred to [facility] had biliary drain tube placement.


On 11/27/2017,he had a biopsy of abdominal mass which was positive for high 

grade B cell lymphoma,RICO negative,BCL6 positive,Non germinal center,FISH for 

MYC and BCL6 were negative but positive for BCL2.


Bone marrow biopsy done on 12/01/2017 was negative and he received first cycle 

of RCHOP at [facility] as inpatient.


Repeat CT scan on 01/26/2018 ,revealed significant improvement in his lymphoma,

persistent fluid collection.


He had total of 4 cycles of RCHOP,last one on 02/02/2018


He ended up re admitted to [facility] after cycle#4 with bowel perforation,infection,

had along hospital stay for about 6 weeks and discharged for rehab.





Rock was seen in office of Friday with increasing pain, nausea and vomiting. 

He was unable to eat because he continued to feel nauseated. He did state he 

was having bowel movements at that time. He was clinically appearing 

dehydrated. IV Hydration and antiemetics were given and abdominal flat plate 

ordered. Flat plate failed to show concern for obstruction. He was feeling 

better and discharged home from the office. 





7/9/18 - He presented to [facility] Emergency Department with complaints of the 

pain in his abdomen persisting and worsening. CT scan of Abdomen and Pelvis was 

compared to previous diagnostic imaging at [facility]. This may present recurrence, 

difficult to tell secondary to patients multiple surgeries and complications in 

this area. He is not a surgical candidate through [facility]. 





7/10/18 - Patient seen in follow-up today. Spoke with RN and she stated when 

she went to change his fentanyl patch to a new 25mcg (his home dose per MAR) he 

was wearing 2 - 25mcg fentanyls. She stated the morphine is not helping his 

pain for longer than an hour to keep his pain score lower than 6. When the 

patient and wife was asked about how long he was wearing a total of 50mcg 

fentanyl they stated they went back up to 50mcg 3 days ago because his pain was 

so severe. I further reviewed his ofice chart and inactive medications and he 

was recently stopped on 30mg IR morphine and given Norco. Last month he was 

also maintained on 75mcg fentanyl patches. According to patient and wife Dr. Wilkerson manages his pain medications as an outpatient was trying to wean him 

off his fentanyl and po prn morphine because he didnt need as much for PRN and 

his pain seemed to be well controlled. It appears that the increased pain 

crisis he is having and nausea may also be related to his change in opioid 

management. 





7/11/18 - Patient has been increased back on the pain medication he was on as 

outpatient prior to his increased, uncontrolled pain. His largest complaint is 

mid back sharp pain and nausea, no emesis when seen this am. He has no 

neurological or unilateral weakness to suspect concern for cord or nerve 

involvement in back, although if persists MRI of the back maybe resonable. He 

has not moved bowels since admission, I believe this is largely contributing to 

his nausea. He is passing gas and has bowel sounds present, he is not eating as 

no appetite and food tastes bad. There is oral thrush present on exam. 





7/12/18 - Patient seen in follow-up, still no bowel movement and nauseated, 

although pain is better controlled. He has not been able to tolerate food by 

mouth yet. His bowel regimen has been altered and increased. Abdomen is soft, 

bowel sounds are present and he is passing gas. 





7/13/18 - Pain is controlled, nausea controlled, tolerating small bites of 

food. I have brought him his prescriptions for his Fentanyl, MS IR, and Xanax 

today. A MAPS and Opiod counseling was provided. A Narcotic COntract was 

reviewed and signed.   





Objective





- Vital Signs


Vital signs: 


 Vital Signs











Temp  97.8 F   07/13/18 15:00


 


Pulse  92   07/13/18 15:00


 


Resp  16   07/13/18 15:00


 


BP  134/84   07/13/18 15:00


 


Pulse Ox  100   07/13/18 15:00








 Intake & Output











 07/13/18 07/13/18 07/14/18





 06:59 18:59 06:59


 


Intake Total 690  


 


Output Total 920  


 


Balance -230  


 


Weight 68.5 kg 68.5 kg 


 


Intake:   


 


    


 


    Sodium Chloride 0.9% 1, 330  





    000 ml @ 120 mls/hr IV .   





    Q8H20M [facility] Rx#:[id number]   


 


  Intake, IV Titration 360  





  Amount   


 


    Sodium Chloride 0.9% 1, 360  





    000 ml @ 120 mls/hr IV .   





    Q8H20M [facility] Rx#:[id number]   


 


Output:   


 


  Urine 920  


 


Other:   


 


  Voiding Method Urinal Urinal 


 


  # Voids  1 














- Constitutional


General appearance: Present: cooperative, no acute distress





- EENT


Eyes: Present: EOMI, PERRLA, dentition normal


ENT: Present: NA/AT, normal oropharynx





- Neck


Details: 





Supple, trachea midline


Neck: Present: normal ROM





- Respiratory


Respiratory: bilateral: CTA (no increased effort)





- Cardiovascular


Rhythm: regular


Heart sounds: normal: S1, S2





- Gastrointestinal


Gastrointestinal Comment(s): 





Decompression tube draining intact


General gastrointestinal: Present: normal bowel sounds, soft





- Integumentary


Integumentary: Present: normal





- Neurologic


Neurologic Comment(s): 





No focal defects


Neurologic: Present: CNII-XII intact





- Musculoskeletal


Musculoskeletal: Present: generalized weakness, strength equal bilaterally





- Psychiatric


Psychiatric: Present: A&O x's 3, appropriate affect, intact judgment & insight





- Labs


CBC & Chem 7: 


 07/13/18 07:14





 07/13/18 07:14


Labs: 


 Abnormal Lab Results - Last 24 Hours (Table)











  07/12/18 07/13/18 07/13/18 Range/Units





  20:32 07:00 07:14 


 


WBC     (3.8-10.6)  k/uL


 


RBC     (4.30-5.90)  m/uL


 


Hgb     (13.0-17.5)  gm/dL


 


Hct     (39.0-53.0)  %


 


Lymphocytes #     (1.0-4.8)  k/uL


 


Sodium    136 L  (137-145)  mmol/L


 


Creatinine    0.40 L  (0.66-1.25)  mg/dL


 


Glucose    104 H  (74-99)  mg/dL


 


POC Glucose (mg/dL)  137 H  105 H   (75-99)  mg/dL


 


Phosphorus    4.6 H  (2.5-4.5)  mg/dL














  07/13/18 07/13/18 Range/Units





  07:14 11:06 


 


WBC  3.3 L   (3.8-10.6)  k/uL


 


RBC  2.84 L   (4.30-5.90)  m/uL


 


Hgb  8.2 L   (13.0-17.5)  gm/dL


 


Hct  25.4 L   (39.0-53.0)  %


 


Lymphocytes #  0.3 L   (1.0-4.8)  k/uL


 


Sodium    (137-145)  mmol/L


 


Creatinine    (0.66-1.25)  mg/dL


 


Glucose    (74-99)  mg/dL


 


POC Glucose (mg/dL)   136 H  (75-99)  mg/dL


 


Phosphorus    (2.5-4.5)  mg/dL














Assessment and Plan


Plan: 





Assessment and Recommendations:





1. High Grade B Cell Lymphoma - Status Post 4 cycles of R-CHOP 


 - After 4th cycle, treatment compromised by bowel perforation and extended 

hospitalization and rehabilitation through [facility]


 - Not current candidate for chemotherapy at this time, so has remained on 

surveillance under the care of Dr. Pardo 


 - CT abd and Pelvis is unclear and possibly concerning for recurrence. I have 

discussed with Dr. Pardo and we feel an outpatient PET scan would be best to 

clarify if recurrent disease has represented. If so, a Biopsy will be ordered 

as outpatient for confirmation of recurrence





2. Pain and nausea unrelieved with outpatient management:


 - Continue current supportive care and symptom control, when symptoms are 

stabilized plan to discharge and follow-up for further diagnostics to 

differentiate recurrent lymphoma versus other


 - Patient was recently on 75mcg of fentanyl and Morphine Sulfate PO IR every 8 

hours PRN, ordered by Dr. Wilkerson who was in the process of weaning him down on 

his pain meds and most recent wean was to fentanyl 25mcg and norco prn. The 

weaning started last month and appears to be associated with his increasing 

pain. He also self-increased his fentanyl patch at home to 50mcg 3 days ago to 

try to see if this would help decrease his discomfort. Morphine 4mg only 

appears to be controlling pain for 1-2 hours. He still appears uncomfortable


 - I have increased his fentanyl patch back to 75mcg (as he has been on 50mcg 

the past three days with no improvements)


 - I recommend we discontinue the morphine IV 2mg and keep the 4mg IV for 

severe prn pain, but decrease the duration to 3 hours


 - I also recomend we add back his morphine IR po (in preparation to discharge 

with pain controlled). Morphine Sulfate 15mg IR PRN every 6 hours (home dose 

was 30mg po)


 - Long discussion with patient on utilizing po pain medicine first. His pain 

is currently tolerated on PO MS and Fentanyl 


 -  I have brought him his prescriptions for his Fentanyl, MS IR, and Xanax 

today. A MAPS and Opiod counseling was provided. A Narcotic COntract was 

reviewed and signed.  





3. Persistent Nausea, no Emesis recently:


 - Antiemetics prn


 - Treat contributing problem with intense bowel regimen





4. Decreased PO Intake, Weight Loss: 


 - Secondary to constipation, Thrush, and nausea


 - Add marinol to help increase appetite and treat underlying problem of 

Constipation and THrush





5. Constipation: Secondary to increased lymphadenopathy in abdominal area with 

increased need for narcotic pain medicine


 - Patient did have soap suds enema today


 - Abdomen is soft, Positive Flatulence, Positive Bowel Sounds


 - Reglan





6. Oral Thrush: 


 - Nystatin swish, re-educated patient refused





7. Normocytic Anemia - Chronic, Secondary to chronic inflammation and lymphoma


 - Monitor CBC, transfuse for hemoglobin less than 7





8. Leukopenia - Trending down, ?lymphoma


 - Monitor CBC and review smear





DISPO Plan - Is to control pain (improved), tolerate po intake, pass BM, he 

will need PET scan for confirmation of disease recurrence. This is scheduled 

Saturday 7/14/18. 








Thank You for allowing us to care along  with this patient


Sydney Carpenter NP.

## 2018-07-14 ENCOUNTER — HOSPITAL ENCOUNTER (OUTPATIENT)
Dept: HOSPITAL 47 - RADPETMAIN | Age: 58
Discharge: HOME | End: 2018-07-14
Attending: INTERNAL MEDICINE
Payer: COMMERCIAL

## 2018-07-14 DIAGNOSIS — R91.1: Primary | ICD-10-CM

## 2018-07-14 DIAGNOSIS — C83.33: ICD-10-CM

## 2018-07-14 PROCEDURE — 78815 PET IMAGE W/CT SKULL-THIGH: CPT

## 2018-07-14 NOTE — PE
EXAMINATION TYPE: PET CT fusion skull to thigh

 

DATE OF EXAM: 7/14/2018

 

COMPARISON: EXAMINATION TYPE: PET CT fusion skull to thigh

 

DATE OF EXAM: 7/14/2018

 

COMPARISON: CT abdomen and pelvis July 9, 2018. CT chest abdomen and pelvis June 27, 2018 and older abimbola beebe.

 

HISTORY: Lymphoma progress study. Originally diagnosed on biopsy stomach December 2017. Completed eve
motherapy February 2018.

 

TECHNIQUE:  Following the intravenous administration of 13.95 mCi of F-18 FDG, whole body images are 
performed from the skull base to the midthigh.  Images are reviewed on the computer in the coronal, a
xial, and sagittal planes.  Reconstructed rotating images are created on independent workstation and 
reviewed on the computer.   A noncontrast CT is performed in conjunction with the PET scan.

 

SCAN:   Subsequent scan   ??

 

FINDINGS: 

Mean SUV mediastinum: 1.03

Mean SUV liver: 1.77

 

SKULL BASE AND NECK:  There are symmetric prevertebral muscular uptake in the neck could reflect infl
ammatory change. Symmetric uptake at level of vocal cords is presumed benign related to phonation. No
 suspicious hypermetabolic uptake is present.

 

CHEST, MEDIASTINUM, AND HILAR REGION: There is new peripheral inferior left lower lobe slightly hyper
metabolic nodule or nodular opacity measuring roughly 1.3 x 1.1 cm on axial image 77 abutting the fis
sure with max SUV of 3.0. There is new 4 mm posterior a metabolic nodule superior aspect left lower l
obe on axial image 76. There is dependent atelectasis in both lower lobes. There is bibasilar linear 
scarring and/or atelectasis identified.

 

No additional areas of abnormal hypermetabolic uptake are present.

 

ABDOMEN AND PELVIS: There is redemonstration of surgical sutures from gastric bypass type procedure. 
There is persistent abnormal hypermetabolic left mid abdominal mass measuring approximately 6.6 x 4.8
 cm axial image 137 no significant change in size from most recent CT, max SUV is 18.54. There are ad
jacent hypermetabolic nodules, largest is right para-aortic level measuring 1.6 x 1.3 cm current stud
y axial image 134 stable or slightly more prominent versus most recent CT. Max SUV is 16.38. No addit
ional areas of suspicious hypermetabolic uptake are identified.

 

OSSEOUS STRUCTURES: No areas of suspicious hypermetabolic uptake are seen.

 

OTHER CT: Mild calcified plaque bilateral carotid bulb level is present.

 

There is stable right-sided subclavian Mediport catheter terminating near cavoatrial junction.

 

There is moderate coronary artery calcification which is noted marker for coronary artery disease.

 

There is anterior upper abdominal pigtail catheter redemonstrated near axial image 125.

 

There is mild calcified plaque of aorta extending into branch vessels.

 

Prostate gland is mildly enlarged bulging on bladder base, underlying BPH is suspected.

 

IMPRESSION: Persistent left mid abdominal lymphoma or neoplasm with adjacent smaller nodules. No sign
ificant change from most recent CT. New suspicious left mid lung hypermetabolic nodule. Cannot exclud
e developing metastatic disease. I do not have prior PET/CT for direct PET/CT comparison.

 

 

 

 

 

 

 

 

 

 

 

 

 

HISTORY:

 

TECHNIQUE:  Following the intravenous administration of mCi of F-18 FDG, whole body images are perfor
med from the skull base to the midthigh.  Images are reviewed on the computer in the coronal, axial, 
and sagittal planes.  Reconstructed rotating images are created on independent workstation and review
ed on the computer.   A localization and attenuation correction CT is performed in conjunction with t
he PET scan.

 

SCAN:

 

FINDINGS: 

 

SKULL BASE AND NECK:

 

CHEST, MEDIASTINUM, AND HILAR REGION:

 

ABDOMEN AND PELVIS:

 

OSSEOUS STRUCTURES:

 

OTHER CT:

 

IMPRESSION:

## 2018-07-24 ENCOUNTER — HOSPITAL ENCOUNTER (EMERGENCY)
Dept: HOSPITAL 47 - EC | Age: 58
Discharge: HOME | End: 2018-07-24
Payer: COMMERCIAL

## 2018-07-24 VITALS
HEART RATE: 108 BPM | DIASTOLIC BLOOD PRESSURE: 93 MMHG | SYSTOLIC BLOOD PRESSURE: 160 MMHG | TEMPERATURE: 97.1 F | RESPIRATION RATE: 20 BRPM

## 2018-07-24 DIAGNOSIS — Z92.21: ICD-10-CM

## 2018-07-24 DIAGNOSIS — Z79.899: ICD-10-CM

## 2018-07-24 DIAGNOSIS — C85.90: Primary | ICD-10-CM

## 2018-07-24 DIAGNOSIS — Z90.49: ICD-10-CM

## 2018-07-24 DIAGNOSIS — R11.0: ICD-10-CM

## 2018-07-24 DIAGNOSIS — Z98.84: ICD-10-CM

## 2018-07-24 DIAGNOSIS — R10.10: ICD-10-CM

## 2018-07-24 DIAGNOSIS — Z88.8: ICD-10-CM

## 2018-07-24 DIAGNOSIS — Z87.891: ICD-10-CM

## 2018-07-24 DIAGNOSIS — Z79.4: ICD-10-CM

## 2018-07-24 LAB
ALBUMIN SERPL-MCNC: 3.5 G/DL (ref 3.5–5)
ALP SERPL-CCNC: 179 U/L (ref 38–126)
ALT SERPL-CCNC: 40 U/L (ref 21–72)
AMYLASE SERPL-CCNC: <30 U/L (ref 30–110)
ANION GAP SERPL CALC-SCNC: 10 MMOL/L
AST SERPL-CCNC: 31 U/L (ref 17–59)
BASOPHILS # BLD AUTO: 0 K/UL (ref 0–0.2)
BASOPHILS NFR BLD AUTO: 0 %
BUN SERPL-SCNC: 26 MG/DL (ref 9–20)
CALCIUM SPEC-MCNC: 8.4 MG/DL (ref 8.4–10.2)
CHLORIDE SERPL-SCNC: 99 MMOL/L (ref 98–107)
CO2 SERPL-SCNC: 28 MMOL/L (ref 22–30)
EOSINOPHIL # BLD AUTO: 0.1 K/UL (ref 0–0.7)
EOSINOPHIL NFR BLD AUTO: 2 %
ERYTHROCYTE [DISTWIDTH] IN BLOOD BY AUTOMATED COUNT: 2.92 M/UL (ref 4.3–5.9)
ERYTHROCYTE [DISTWIDTH] IN BLOOD: 15.6 % (ref 11.5–15.5)
GLUCOSE SERPL-MCNC: 127 MG/DL (ref 74–99)
HCT VFR BLD AUTO: 26 % (ref 39–53)
HGB BLD-MCNC: 8.2 GM/DL (ref 13–17.5)
LIPASE SERPL-CCNC: 17 U/L (ref 23–300)
LYMPHOCYTES # SPEC AUTO: 0.2 K/UL (ref 1–4.8)
LYMPHOCYTES NFR SPEC AUTO: 7 %
MCH RBC QN AUTO: 28 PG (ref 25–35)
MCHC RBC AUTO-ENTMCNC: 31.5 G/DL (ref 31–37)
MCV RBC AUTO: 88.9 FL (ref 80–100)
MONOCYTES # BLD AUTO: 0.3 K/UL (ref 0–1)
MONOCYTES NFR BLD AUTO: 9 %
NEUTROPHILS # BLD AUTO: 2.5 K/UL (ref 1.3–7.7)
NEUTROPHILS NFR BLD AUTO: 80 %
PLATELET # BLD AUTO: 228 K/UL (ref 150–450)
POTASSIUM SERPL-SCNC: 4 MMOL/L (ref 3.5–5.1)
PROT SERPL-MCNC: 6 G/DL (ref 6.3–8.2)
SODIUM SERPL-SCNC: 137 MMOL/L (ref 137–145)
WBC # BLD AUTO: 3.2 K/UL (ref 3.8–10.6)

## 2018-07-24 PROCEDURE — 99284 EMERGENCY DEPT VISIT MOD MDM: CPT

## 2018-07-24 PROCEDURE — 96361 HYDRATE IV INFUSION ADD-ON: CPT

## 2018-07-24 PROCEDURE — 80053 COMPREHEN METABOLIC PANEL: CPT

## 2018-07-24 PROCEDURE — 96375 TX/PRO/DX INJ NEW DRUG ADDON: CPT

## 2018-07-24 PROCEDURE — 74018 RADEX ABDOMEN 1 VIEW: CPT

## 2018-07-24 PROCEDURE — 83690 ASSAY OF LIPASE: CPT

## 2018-07-24 PROCEDURE — 82150 ASSAY OF AMYLASE: CPT

## 2018-07-24 PROCEDURE — 36415 COLL VENOUS BLD VENIPUNCTURE: CPT

## 2018-07-24 PROCEDURE — 85025 COMPLETE CBC W/AUTO DIFF WBC: CPT

## 2018-07-24 PROCEDURE — 96374 THER/PROPH/DIAG INJ IV PUSH: CPT

## 2018-07-24 NOTE — XR
EXAMINATION TYPE: XR KUB

 

DATE OF EXAM: 7/24/2018

 

COMPARISON: 7/6/2018

 

HISTORY: Abdominal pain

 

TECHNIQUE: 2 views

 

FINDINGS: There is a left-sided pigtail drainage catheter. The location is not clear. There is no sig
n of intestinal obstruction or pneumoperitoneum. There surgical clips in the left upper quadrant. The
re are no pathologic calcifications over the kidneys. Lung bases are clear of consolidation..

 

IMPRESSION: Left-sided drainage catheter. Nonacute abdomen. No significant change compared to last ex
am.

## 2018-07-24 NOTE — ED
General Adult HPI





- General


Chief complaint: Abdominal Pain


Stated complaint: Abd pain


Time Seen by Provider: 18 02:14


Source: patient, RN notes reviewed, old records reviewed


Mode of arrival: ambulatory


Limitations: physical limitation





- History of Present Illness


Initial comments: 





Patient's a 58-year-old male significant past medical history for non-Hodgkin's 

lymphoma, partial gastrectomy, biliary stent, duodenal mass, presenting to the 

emergency room today with a chief complaint of increased abdominal pain and 

discomfort over the last 2 days.  Patient is taking morphine at home services 

not relieving his symptoms.  Patient admits feeling nauseated but no vomiting.  

Patient admits to pain in the upper abdomen with some radiation into the back.  

He denies any other complaints or symptoms. Patient denies any recent fever, 

chills, shortness of breath, chest pain, vomiting, numbness or tingling, 

dysuria or hematuria, constipation or diarrhea, headaches or visual changes, or 

any other complaints.





- Related Data


 Home Medications











 Medication  Instructions  Recorded  Confirmed


 


Ergocalciferol [Vitamin D2 50,000 unit PO WE 01/15/18 07/09/18





(DRISDOL)]   


 


Insulin Lispro [humaLOG Kwikpen] See Protocol SQ ACHS 18


 


LORazepam [Ativan] 1 mg PO Q8H PRN 18


 


Omeprazole [PriLOSEC] 20 mg PO DAILY 18


 


Ondansetron HCl [Zofran] 4 mg PO TID PRN 18


 


Prochlorperazine [Compazine] 10 mg PO Q6H PRN 18


 


Sennosides [Senna] 8.6 mg PO DAILY 18


 


Sertraline [Zoloft] 100 mg PO DAILY 18








 Previous Rx's











 Medication  Instructions  Recorded


 


Morphine Sulfate Ir [MSIR] 15 mg PO Q4HR PRN #120 tablet 18


 


Psyllium Husk 100% [Metamucil 6 gm PO DAILY  packet 18





Packet]  


 


fentaNYL 75MCG/HR PATCH [Duragesic 1 patch TRANSDERM Q72H #10 patch 18





75MCG/HR]  











 Allergies











Allergy/AdvReac Type Severity Reaction Status Date / Time


 


medication for TB exposure Allergy  Unknown Uncoded 18 02:13














Review of Systems


ROS Statement: 


Those systems with pertinent positive or pertinent negative responses have been 

documented in the HPI.





ROS Other: All systems not noted in ROS Statement are negative.





Past Medical History


Past Medical History: Cancer


Additional Past Medical History / Comment(s): 17 abdominal pain/anemia 

with surgical intervention, 10/2017 lymphoma diagnosed/abdominal masses at [facility] 

and pt started chemo there-1 session then 3 more sessions thru [facility]/Dr. Pardo, chemo caused severe abdominal pain and pt was diagnosed with perforated 

intestines d/t abdominal masses shrinkage/pulling on intestine-[facility] stated pt 

not surgical candidate and placed on antibiotics/long complicated 

hospitalization per spouse.


History of Any Multi-Drug Resistant Organisms: None Reported


Past Surgical History: Appendectomy, Orthopedic Surgery


Additional Past Surgical History / Comment(s): 17 laparoscopic partial 

gastrectomy with lysis of adhesions, reduction R inguinal hernia, resection 

gastroenteric fistula, gastrojujunostomy with reconstruction, EGDs/colonoscopy, 

ACL bilateral repairs, several bilateral knee arthroscopies, PICC line inserted 

17.


Past Anesthesia/Blood Transfusion Reactions: No Reported Reaction


Additional Past Anesthesia/Blood Transfusion Reaction / Comment(s): Pt has 

received blood without reaction.


Past Psychological History: No Psychological Hx Reported


Smoking Status: Former smoker


Past Alcohol Use History: None Reported


Past Drug Use History: None Reported





- Past Family History


  ** Father


Family Medical History: Pneumonia


Additional Family Medical History / Comment(s): Father had anemia.  He  at 

the age of 79yrs from complications after a ingrown toenail was removed and 

then became infected and had to have amputation.





  ** Mother


Family Medical History: Dementia


Additional Family Medical History / Comment(s): Mother  of dementia at the 

age of 82 yrs.





General Exam





- General Exam Comments


Initial Comments: 





General:  The patient is awake and alert, in mild discomfort.


Eye:  Pupils are equal, round and reactive to light, extra-ocular movements are 

intact.  No nystagmus.  There is normal conjunctiva bilaterally.  No signs of 

icterus.  


Ears, nose, mouth and throat:  There are moist mucous membranes and no oral 

lesions. 


Neck:  The neck is supple, there is no tenderness or JVD.  


Cardiovascular:  There is a regular rate and rhythm. No murmur, rub or gallop 

is appreciated.


Respiratory:  Lungs are clear to auscultation, respirations are non-labored, 

breath sounds are equal.  No wheezes, stridor, rales, or rhonchi.


Gastrointestinal: Drinking too placed in the upper abdomen.  Abdomen soft on 

palpation.  Discomfort to the upper and middle portions of the abdomen.  No CVA 

tenderness.  No guarding.


Musculoskeletal:  Normal ROM, no tenderness.  Strength 5/5. Sensation intact. 

Pulses equal bilaterally 2+.  


Neurological:  A&O x 3. CN II-XII intact, There are no obvious motor or sensory 

deficits. Coordination appears grossly intact. Speech is normal.


Skin:  Skin is warm and dry and no rashes or lesions are noted. 


Psychiatric:  Cooperative, appropriate mood & affect, normal judgment.  


Limitations: physical limitation





Course


 Vital Signs











  18





  02:08


 


Temperature 97.7 F


 


Pulse Rate 109 H


 


Respiratory 18





Rate 


 


Blood Pressure 111/70


 


O2 Sat by Pulse 100





Oximetry 














Medical Decision Making





- Medical Decision Making





Patient reexamined at this time states he does feel much better after IV 

morphine given here in the emergency room.  Patient's labs are been reviewed 

and shows similar findings from most recent labs.  His hemoglobin is 8.2 to 

stable for him.  Patient's feeling better and requesting that he would like to 

go home.  Is currently 4 AM at this time and patient feels couple following up 

with his oncologist when the office opens this morning.  Patient advised to 

return here in the emergency room if any symptoms increase or worsen or for any 

other concerns.





- Lab Data


Result diagrams: 


 18 03:15





 18 03:15


 Lab Results











  18 Range/Units





  03:15 03:15 


 


WBC   3.2 L  (3.8-10.6)  k/uL


 


RBC   2.92 L  (4.30-5.90)  m/uL


 


Hgb   8.2 L  (13.0-17.5)  gm/dL


 


Hct   26.0 L  (39.0-53.0)  %


 


MCV   88.9  (80.0-100.0)  fL


 


MCH   28.0  (25.0-35.0)  pg


 


MCHC   31.5  (31.0-37.0)  g/dL


 


RDW   15.6 H  (11.5-15.5)  %


 


Plt Count   228  (150-450)  k/uL


 


Neutrophils %   80  %


 


Lymphocytes %   7  %


 


Monocytes %   9  %


 


Eosinophils %   2  %


 


Basophils %   0  %


 


Neutrophils #   2.5  (1.3-7.7)  k/uL


 


Lymphocytes #   0.2 L  (1.0-4.8)  k/uL


 


Monocytes #   0.3  (0-1.0)  k/uL


 


Eosinophils #   0.1  (0-0.7)  k/uL


 


Basophils #   0.0  (0-0.2)  k/uL


 


Hypochromasia   Moderate  


 


Poikilocytosis   Slight  


 


Sodium  137   (137-145)  mmol/L


 


Potassium  4.0   (3.5-5.1)  mmol/L


 


Chloride  99   ()  mmol/L


 


Carbon Dioxide  28   (22-30)  mmol/L


 


Anion Gap  10   mmol/L


 


BUN  26 H   (9-20)  mg/dL


 


Creatinine  0.44 L   (0.66-1.25)  mg/dL


 


Est GFR (CKD-EPI)AfAm  >90   (>60 ml/min/1.73 sqM)  


 


Est GFR (CKD-EPI)NonAf  >90   (>60 ml/min/1.73 sqM)  


 


Glucose  127 H   (74-99)  mg/dL


 


Calcium  8.4   (8.4-10.2)  mg/dL


 


Total Bilirubin  0.8   (0.2-1.3)  mg/dL


 


AST  31   (17-59)  U/L


 


ALT  40   (21-72)  U/L


 


Alkaline Phosphatase  179 H   ()  U/L


 


Total Protein  6.0 L   (6.3-8.2)  g/dL


 


Albumin  3.5   (3.5-5.0)  g/dL


 


Amylase  <30 L   ()  U/L


 


Lipase  17 L   ()  U/L














Disposition


Clinical Impression: 


 Abdominal pain, History of Yisel-en-Y gastric bypass, Anemia, S/P bypass 

gastrojejunostomy, Lymphoma





Disposition: HOME SELF-CARE


Condition: Stable


Instructions:  Abdominal Pain (ED)


Is patient prescribed a controlled substance at d/c from ED?: No


Referrals: 


Ibrahima Wilkerson DO [Primary Care Provider] - 1-2 days


Christopher Pardo MD [STAFF PHYSICIAN] - 1-2 days


Time of Disposition: 03:58

## 2018-07-25 ENCOUNTER — HOSPITAL ENCOUNTER (INPATIENT)
Dept: HOSPITAL 47 - EC | Age: 58
LOS: 7 days | Discharge: HOSPICE | DRG: 840 | End: 2018-08-01
Attending: HOSPITALIST | Admitting: HOSPITALIST
Payer: COMMERCIAL

## 2018-07-25 VITALS — BODY MASS INDEX: 21.4 KG/M2

## 2018-07-25 DIAGNOSIS — Z81.8: ICD-10-CM

## 2018-07-25 DIAGNOSIS — G92: ICD-10-CM

## 2018-07-25 DIAGNOSIS — M54.9: ICD-10-CM

## 2018-07-25 DIAGNOSIS — Z79.899: ICD-10-CM

## 2018-07-25 DIAGNOSIS — Z96.89: ICD-10-CM

## 2018-07-25 DIAGNOSIS — E44.1: ICD-10-CM

## 2018-07-25 DIAGNOSIS — Z79.4: ICD-10-CM

## 2018-07-25 DIAGNOSIS — Z90.3: ICD-10-CM

## 2018-07-25 DIAGNOSIS — E86.0: ICD-10-CM

## 2018-07-25 DIAGNOSIS — F40.240: ICD-10-CM

## 2018-07-25 DIAGNOSIS — Z87.891: ICD-10-CM

## 2018-07-25 DIAGNOSIS — L30.9: ICD-10-CM

## 2018-07-25 DIAGNOSIS — Z83.6: ICD-10-CM

## 2018-07-25 DIAGNOSIS — K83.1: ICD-10-CM

## 2018-07-25 DIAGNOSIS — Z66: ICD-10-CM

## 2018-07-25 DIAGNOSIS — Z88.8: ICD-10-CM

## 2018-07-25 DIAGNOSIS — Z51.5: ICD-10-CM

## 2018-07-25 DIAGNOSIS — T45.1X5A: ICD-10-CM

## 2018-07-25 DIAGNOSIS — Z83.2: ICD-10-CM

## 2018-07-25 DIAGNOSIS — T40.605A: ICD-10-CM

## 2018-07-25 DIAGNOSIS — C85.13: Primary | ICD-10-CM

## 2018-07-25 DIAGNOSIS — Z86.010: ICD-10-CM

## 2018-07-25 DIAGNOSIS — G89.29: ICD-10-CM

## 2018-07-25 DIAGNOSIS — D64.81: ICD-10-CM

## 2018-07-25 DIAGNOSIS — M19.91: ICD-10-CM

## 2018-07-25 DIAGNOSIS — Z79.891: ICD-10-CM

## 2018-07-25 LAB
ALBUMIN SERPL-MCNC: 3.7 G/DL (ref 3.5–5)
ALP SERPL-CCNC: 186 U/L (ref 38–126)
ALT SERPL-CCNC: 34 U/L (ref 21–72)
AMMONIA PLAS-SCNC: <9 UMOL/L (ref ?–30)
AMYLASE SERPL-CCNC: <30 U/L (ref 30–110)
ANION GAP SERPL CALC-SCNC: 10 MMOL/L
APTT BLD: 29.2 SEC (ref 22–30)
AST SERPL-CCNC: 32 U/L (ref 17–59)
BASOPHILS # BLD AUTO: 0 K/UL (ref 0–0.2)
BASOPHILS NFR BLD AUTO: 0 %
BUN SERPL-SCNC: 20 MG/DL (ref 9–20)
CALCIUM SPEC-MCNC: 9.1 MG/DL (ref 8.4–10.2)
CHLORIDE SERPL-SCNC: 100 MMOL/L (ref 98–107)
CO2 SERPL-SCNC: 27 MMOL/L (ref 22–30)
EOSINOPHIL # BLD AUTO: 0 K/UL (ref 0–0.7)
EOSINOPHIL NFR BLD AUTO: 1 %
ERYTHROCYTE [DISTWIDTH] IN BLOOD BY AUTOMATED COUNT: 2.87 M/UL (ref 4.3–5.9)
ERYTHROCYTE [DISTWIDTH] IN BLOOD: 15.4 % (ref 11.5–15.5)
GLUCOSE SERPL-MCNC: 123 MG/DL (ref 74–99)
HCT VFR BLD AUTO: 25.8 % (ref 39–53)
HGB BLD-MCNC: 8.2 GM/DL (ref 13–17.5)
INR PPP: 1.1 (ref ?–1.2)
LACTATE BLDV-SCNC: 1.1 MMOL/L (ref 0.7–2)
LIPASE SERPL-CCNC: 15 U/L (ref 23–300)
LYMPHOCYTES # SPEC AUTO: 0.2 K/UL (ref 1–4.8)
LYMPHOCYTES NFR SPEC AUTO: 8 %
MCH RBC QN AUTO: 28.7 PG (ref 25–35)
MCHC RBC AUTO-ENTMCNC: 31.9 G/DL (ref 31–37)
MCV RBC AUTO: 89.8 FL (ref 80–100)
MONOCYTES # BLD AUTO: 0.3 K/UL (ref 0–1)
MONOCYTES NFR BLD AUTO: 9 %
NEUTROPHILS # BLD AUTO: 2.2 K/UL (ref 1.3–7.7)
NEUTROPHILS NFR BLD AUTO: 79 %
PH UR: 7 [PH] (ref 5–8)
PLATELET # BLD AUTO: 228 K/UL (ref 150–450)
POTASSIUM SERPL-SCNC: 4 MMOL/L (ref 3.5–5.1)
PROT SERPL-MCNC: 6.3 G/DL (ref 6.3–8.2)
PT BLD: 10.5 SEC (ref 9–12)
SODIUM SERPL-SCNC: 137 MMOL/L (ref 137–145)
SP GR UR: 1.01 (ref 1–1.03)
UROBILINOGEN UR QL STRIP: <2 MG/DL (ref ?–2)
WBC # BLD AUTO: 2.9 K/UL (ref 3.8–10.6)

## 2018-07-25 PROCEDURE — 81003 URINALYSIS AUTO W/O SCOPE: CPT

## 2018-07-25 PROCEDURE — 70450 CT HEAD/BRAIN W/O DYE: CPT

## 2018-07-25 PROCEDURE — 87186 SC STD MICRODIL/AGAR DIL: CPT

## 2018-07-25 PROCEDURE — 96365 THER/PROPH/DIAG IV INF INIT: CPT

## 2018-07-25 PROCEDURE — 80202 ASSAY OF VANCOMYCIN: CPT

## 2018-07-25 PROCEDURE — 87075 CULTR BACTERIA EXCEPT BLOOD: CPT

## 2018-07-25 PROCEDURE — 85025 COMPLETE CBC W/AUTO DIFF WBC: CPT

## 2018-07-25 PROCEDURE — 85610 PROTHROMBIN TIME: CPT

## 2018-07-25 PROCEDURE — 96375 TX/PRO/DX INJ NEW DRUG ADDON: CPT

## 2018-07-25 PROCEDURE — 80048 BASIC METABOLIC PNL TOTAL CA: CPT

## 2018-07-25 PROCEDURE — 84100 ASSAY OF PHOSPHORUS: CPT

## 2018-07-25 PROCEDURE — 82140 ASSAY OF AMMONIA: CPT

## 2018-07-25 PROCEDURE — 87205 SMEAR GRAM STAIN: CPT

## 2018-07-25 PROCEDURE — 93005 ELECTROCARDIOGRAM TRACING: CPT

## 2018-07-25 PROCEDURE — 84478 ASSAY OF TRIGLYCERIDES: CPT

## 2018-07-25 PROCEDURE — 83690 ASSAY OF LIPASE: CPT

## 2018-07-25 PROCEDURE — 84484 ASSAY OF TROPONIN QUANT: CPT

## 2018-07-25 PROCEDURE — 82150 ASSAY OF AMYLASE: CPT

## 2018-07-25 PROCEDURE — 80053 COMPREHEN METABOLIC PANEL: CPT

## 2018-07-25 PROCEDURE — 76937 US GUIDE VASCULAR ACCESS: CPT

## 2018-07-25 PROCEDURE — 87070 CULTURE OTHR SPECIMN AEROBIC: CPT

## 2018-07-25 PROCEDURE — 71046 X-RAY EXAM CHEST 2 VIEWS: CPT

## 2018-07-25 PROCEDURE — 77001 FLUOROGUIDE FOR VEIN DEVICE: CPT

## 2018-07-25 PROCEDURE — 87077 CULTURE AEROBIC IDENTIFY: CPT

## 2018-07-25 PROCEDURE — 96376 TX/PRO/DX INJ SAME DRUG ADON: CPT

## 2018-07-25 PROCEDURE — 85730 THROMBOPLASTIN TIME PARTIAL: CPT

## 2018-07-25 PROCEDURE — 83735 ASSAY OF MAGNESIUM: CPT

## 2018-07-25 PROCEDURE — 83605 ASSAY OF LACTIC ACID: CPT

## 2018-07-25 PROCEDURE — 83036 HEMOGLOBIN GLYCOSYLATED A1C: CPT

## 2018-07-25 PROCEDURE — 93306 TTE W/DOPPLER COMPLETE: CPT

## 2018-07-25 PROCEDURE — 99285 EMERGENCY DEPT VISIT HI MDM: CPT

## 2018-07-25 PROCEDURE — 74018 RADEX ABDOMEN 1 VIEW: CPT

## 2018-07-25 PROCEDURE — 80306 DRUG TEST PRSMV INSTRMNT: CPT

## 2018-07-25 PROCEDURE — 36415 COLL VENOUS BLD VENIPUNCTURE: CPT

## 2018-07-25 PROCEDURE — 87040 BLOOD CULTURE FOR BACTERIA: CPT

## 2018-07-25 PROCEDURE — 96361 HYDRATE IV INFUSION ADD-ON: CPT

## 2018-07-25 PROCEDURE — 36569 INSJ PICC 5 YR+ W/O IMAGING: CPT

## 2018-07-25 RX ADMIN — CEFAZOLIN SCH MLS/HR: 330 INJECTION, POWDER, FOR SOLUTION INTRAMUSCULAR; INTRAVENOUS at 16:20

## 2018-07-25 RX ADMIN — MEROPENEM SCH: 1 INJECTION, POWDER, FOR SOLUTION INTRAVENOUS at 17:18

## 2018-07-25 NOTE — XR
EXAMINATION TYPE: XR chest 2V

 

DATE OF EXAM: 7/25/2018

 

COMPARISON: 2/7/2018

 

INDICATION: Pain nausea

 

TECHNIQUE:  Frontal and lateral views of the chest are obtained.

 

FINDINGS:  

The heart size is normal.  

The pulmonary vasculature is normal.

There is minimal blunting the left costophrenic angle. A small pleural effusion or atelectasis may be
 developing. A PICC line enters on the right with the tip in the region of the proximal right atrium.


 

IMPRESSION:  

1. Blunting the left costophrenic angle. Correlate for small pleural effusion or atelectasis.

## 2018-07-25 NOTE — XR
EXAMINATION TYPE: XR KUB

 

DATE OF EXAM: 7/25/2018

 

COMPARISON: 7/24/2018

 

INDICATION: Abdomen pain

 

TECHNIQUE: Single view abdomen upright view

 

FINDINGS:  

There is fecal debris in the ascending colon. Multiple surgical clips are in left upper quadrant and 
left midabdomen. There is a catheter present within the midabdomen region present previously.

Psoas margins are normal.

No organomegaly is present.

 

 

IMPRESSION: 

1. Nonspecific abdomen similar to 7/24/2018 exam.

## 2018-07-25 NOTE — HP
HISTORY AND PHYSICAL



CHIEF COMPLAINTS:

Change in mental status and confusion and diffuse aches and pains.



HISTORY OF PRESENT ILLNESS:

This 58-year-old gentleman admitted with a past medical history of multiple 
medical

problems, including non-Hodgkin lymphoma, being followed by [facility] and Dr. Pardo in

the outpatient setting, was complaining of change in mental status and 
confusion.  The

patient is disoriented.  The patient is complaining of aches and pains, 
although the

patient could not get comfortable and the patient was taken to [facility] and

admitted for further evaluation and treatment. Of note, the patient also had a 
biliary

stent placed in [facility] for biliary obstruction which is draining 
bile at

this time.  There is no history of any fever, rigors.  No history of headache, 
loss of

consciousness, seizures at this time.  The patient also had chemotherapy.  Most 
recent

PET scan apparently showed some improvement.  The results are not available at 
this

time.  The patient is confused and unable to give a coherent history.  Most of 
the

history is taken in my discussion with the staff, discussion with the ER 
physician,

review of the chart as well as discussion with the family, the wife and the 
daughter at

the bedside.  The patient apparently was slated to have an MRI scan this 
evening by Dr. Pardo.  The patient is currently receiving TPN through a right upper arm PICC 
line

which is planning to be transferred to the left upper arm, according to the 
family.



PAST MEDICAL HISTORY:

History of non-Hodgkin lymphoma, biliary stent and other medical issues as 
mentioned

earlier.



MEDICATIONS PRIOR TO ADMISSION:

Include home medications are:

1.100 mcg q.72 hours.

2. Zoloft 200 mg daily.

3. Compazine 10 mg every 6 hours p.r.n.

4. Prilosec 20 mg daily.

5. MS IR 15 mg q.4h p.r.n.

6. Ativan 1 mg every 6 hours p.r.n.

8. Diflucan 100 mg p.o. b.i.d.

9. Vitamin D2 50,000 units q. week.

10.Marinol 2.5 mg b.i.d.

11.Colace 100 mg p.o. daily.

12.Xanax 0.25 mg daily p.r.n.



ALLERGIES:

Are MEDICATION TB EXPOSURE.



FAMILY HISTORY:

History of pneumonia, history of anemia.



SOCIAL HISTORY:

No history of previous smoking.  No history of alcohol intake.



REVIEW OF SYSTEMS:

Could not be taken, as the patient is confused.



PHYSICAL EXAM:

Patient is conscious, confused.  Pulse is 105, blood pressure 110/80, 
respirations 20,

temperature 97.6, pulse ox 98% on room air.

HEENT:  Conjunctivae normal.  No icterus.  Oral mucosa dry.

NECK:  No jugular venous distention.  No carotid bruits.  No lymph node 
enlargement.

CARDIOVASCULAR:  S1, S2 muffled.  No S3, S4..

RESPIRATORY:  Breath sounds diminished in the bases.  A few scattered rhonchi.  
No

crackles.

ABDOMEN:  Soft, scaphoid.  Mild diffuse discomfort in the epigastrium, feeling 
the

epigastrium and upper abdomen.  Biliary stent in place.  Minimal erythema noted.

Otherwise, very dark-colored bile is noted.

LEGS:  No edema.  No swelling.

NERVOUS SYSTEM:  Higher functions as mentioned earlier.  Moves all 4 limbs.  No 
focal

motor or sensory deficits.

LYMPHATIC:  No lymphadenopathy in neck or axillae.

SKIN:  No ulcer, rash or bleeding.



LABS:

WBC 2.2, hemoglobin is 8.2.  UA noted.



ASSESSMENT:

1. Change in mental status, possible acute sepsis and rule out biliary sepsis.

2. Non-Hodgkin lymphoma, on chemotherapy.

3. Status post biliary stent for extrahepatic biliary obstruction.

4. Leukopenia.

5. Anemia secondary to chemotherapy.

6. History of perforated intestine.

7. History of partial gastrectomy with lysis of adhesions.

8. History of degenerative joint disease.

9. History of nicotine dependence.

10.FULL CODE.



RECOMMENDATIONS AND DISCUSSION:

In this 58-year-old gentleman who presented with multiple medical issues, at 
this time

I recommend to continue current medical management and symptomatic treatment.

Otherwise at this time, I recommend broad-spectrum IV antibiotics, cultures, 
infectious

disease evaluation.  Otherwise, I would also recommend hematology/oncology 
evaluation

by Dr. Robison.  Resume the home medications.  We will hold the pain patch at this 
time.

Guarded prognosis because of multiple complex medical issues.  I discussed the 
family

at length.  Further recommendations to follow.





MMODL / IJN: [id number] / Job#: [id number]

MTDD

## 2018-07-25 NOTE — CT
EXAMINATION TYPE: CT brain wo con

 

DATE OF EXAM: 7/25/2018

 

COMPARISON: None

 

HISTORY: 58-year-old male, confusion, mental status changes per patient family

 

TECHNIQUE:  Examination was done in axial plane without intravenous contrast.  Coronal and sagittal r
econstructions performed.

 

CT DLP: 1047.1 mGycm

Automated exposure control for dose reduction was used.

 

FINDINGS:

There is no evidence of  acute intracranial hemorrhage, acute ischemic changes, mass, mass-effect, or
 extra-axial fluid collection.  There is no effacement of cerebral sulci or basal subarachnoid cister
ns.  There is no hydrocephalus.  There is no midline shift.  Gray-white matter distinction is preserv
ed.

 

Mild generalized cortical atrophy.

 

Trace air fluid level in the left maxillary sinus. Patient's gaze is divergent suggesting underlying 
strabismus. Mastoid air cells well pneumatized. 

 

 

IMPRESSION:

 

1. Mild generalized atrophy. No acute intracranial abnormality seen.

2. Trace air-fluid level left maxillary sinus. Correlate for any symptoms of acute sinusitis.

## 2018-07-25 NOTE — ED
Abdominal Pain HPI





- General


Source: patient, RN notes reviewed, old records reviewed


Mode of arrival: wheelchair


Limitations: no limitations





<Isi Adams - Last Filed: 18 15:53>





<Antoni Guillen - Last Filed: 18 16:03>





- General


Chief Complaint: Abdominal Pain


Stated Complaint: Abd Pain


Time Seen by Provider: 18 11:54





- History of Present Illness


Initial Comments: 





Patient is a 58-year-old male presents emergency department today with chief 

complaint of increased back pain and agitation.  Patient has a history of 

lymphoma including biliary stent placement.  He was recently admitted this 

month for similar complaints of the back pain and abdominal pain.  He reports 

he did have a bowel movement approximately 7 days ago.  Patient states that his 

urine has been normal.  He has been trying to stay hydrated and drinking plenty 

of fluids.  Patient's wife states that she came in today and noticed that his 

bottle pain medicine and anxiety medicine was open in an unusual spot.  The 

Patient has been acting more confused attempting to get into the vehicle to 

drive on his own.  Patient reports that he feels like he is crawling out of his 

skin.  Patient states that he has has no headache.  He denies any shortness of 

breath or chest pain.  He feels nauseated but no vomiting. (Isi Adams)





- Related Data


 Home Medications











 Medication  Instructions  Recorded  Confirmed


 


Ergocalciferol [Vitamin D2 50,000 unit PO WE 01/15/18 07/25/18





(DRISDOL)]   


 


Insulin Lispro [humaLOG Kwikpen] See Protocol SQ ACHS 18


 


LORazepam [Ativan] 1 mg PO Q6H PRN 18


 


Omeprazole [PriLOSEC] 20 mg PO DAILY 18


 


Prochlorperazine [Compazine] 10 mg PO Q6H PRN 18


 


Sertraline [Zoloft] 100 mg PO DAILY 18


 


ALPRAZolam [Xanax] 0.25 mg PO DAILY PRN 18


 


Docusate [Colace] 100 mg PO DAILY 18


 


Dronabinol [Marinol] 2.5 mg PO AC-BID 18


 


Fluconazole [Diflucan] 100 mg PO DAILY 18


 


fentaNYL 100MCG/HR PATCH 1 patch TRANSDERM Q72H 18





[Duragesic 100MCG/HR]   








 Previous Rx's











 Medication  Instructions  Recorded


 


Morphine Sulfate Ir [MSIR] 15 mg PO Q4HR PRN #120 tablet 18











 Allergies











Allergy/AdvReac Type Severity Reaction Status Date / Time


 


medication for TB exposure Allergy  Unknown Uncoded 18 02:13














Review of Systems


ROS Other: All systems not noted in ROS Statement are negative.





<Isi Adams - Last Filed: 18 15:53>


ROS Other: All systems not noted in ROS Statement are negative.





<Antoni Guillen - Last Filed: 18 16:03>


ROS Statement: 


Those systems with pertinent positive or pertinent negative responses have been 

documented in the HPI.








Past Medical History


Past Medical History: Cancer


Additional Past Medical History / Comment(s): 17 abdominal pain/anemia 

with surgical intervention, 10/2017 lymphoma diagnosed/abdominal masses at [facility] 

and pt started chemo there-1 session then 3 more sessions thru [facility]/Dr. Pardo, chemo caused severe abdominal pain and pt was diagnosed with perforated 

intestines d/t abdominal masses shrinkage/pulling on intestine-[facility] stated pt 

not surgical candidate and placed on antibiotics/long complicated 

hospitalization per spouse.


History of Any Multi-Drug Resistant Organisms: None Reported


Past Surgical History: Appendectomy, Orthopedic Surgery


Additional Past Surgical History / Comment(s): 17 laparoscopic partial 

gastrectomy with lysis of adhesions, reduction R inguinal hernia, resection 

gastroenteric fistula, gastrojujunostomy with reconstruction, EGDs/colonoscopy, 

ACL bilateral repairs, several bilateral knee arthroscopies, PICC line inserted 

17.


Past Anesthesia/Blood Transfusion Reactions: No Reported Reaction


Additional Past Anesthesia/Blood Transfusion Reaction / Comment(s): Pt has 

received blood without reaction.


Past Psychological History: No Psychological Hx Reported


Smoking Status: Former smoker


Past Alcohol Use History: None Reported


Past Drug Use History: None Reported





- Past Family History


  ** Father


Family Medical History: Pneumonia


Additional Family Medical History / Comment(s): Father had anemia.  He  at 

the age of 79yrs from complications after a ingrown toenail was removed and 

then became infected and had to have amputation.





  ** Mother


Family Medical History: Dementia


Additional Family Medical History / Comment(s): Mother  of dementia at the 

age of 82 yrs.





<Isi Adams - Last Filed: 18 15:53>





General Exam


Limitations: no limitations


General appearance: alert, in no apparent distress


Head exam: Present: atraumatic, normocephalic, normal inspection


Eye exam: Present: normal appearance, PERRL, EOMI.  Absent: scleral icterus, 

conjunctival injection, periorbital swelling


ENT exam: Present: normal exam, mucous membranes moist


Neck exam: Present: normal inspection.  Absent: tenderness, meningismus, 

lymphadenopathy


Respiratory exam: Present: normal lung sounds bilaterally.  Absent: respiratory 

distress, wheezes, rales, rhonchi, stridor


Cardiovascular Exam: Present: regular rate


GI/Abdominal exam: Present: soft, tenderness (Minimal epigastric tenderness.  

Evidence of biliary stent.), normal bowel sounds.  Absent: distended, guarding, 

rebound, rigid


Extremities exam: Present: normal inspection, full ROM, normal capillary 

refill.  Absent: tenderness, pedal edema, joint swelling, calf tenderness


Back exam: Present: normal inspection


Neurological exam: Present: alert, oriented X3, CN II-XII intact


Psychiatric exam: Present: normal affect, normal mood





<Isi Adams - Last Filed: 18 15:53>





<Antoni Guillen - Last Filed: 18 16:03>





- General Exam Comments


Initial Comments: 


His is an ill-appearing cachectic 58-year-old male. (Isi Adams)





Course





<Isi Adams - Last Filed: 18 15:53>





<Antoni Guillen - Last Filed: 18 16:03>





 Vital Signs











  18





  11:25 14:41 15:46


 


Temperature 97.6 F  


 


Pulse Rate 105 H 97 99


 


Respiratory 20 17 17





Rate   


 


Blood Pressure 129/80 144/85 172/90


 


O2 Sat by Pulse 98 100 99





Oximetry   














- Reevaluation(s)


Reevaluation #1: 





18 13:50


 is reevaluated, quite agitated.  Walking around the room, trying to move 

furniture.  He seems somewhat confused.  Wife states is been acting this way 

nonstop for the past 3 days.  Informed her of all the lab results and have no 

significant change from last tube visit 2 days ago.  She reports she did see 

Charisma beavers yesterday. (Isi Adams)


Reevaluation #2: 





18 16:02


PA supervision I personally saw and examined the patient I reviewed and agree 

with the PA findings including all diagnostic interpretation treatment plans 

has written unless otherwise stated I did discuss the case with Dr. Perez the 

patient will be admitted with medical oncology consultation. (Antoni Guillen)





Medical Decision Making





- Lab Data


Result diagrams: 


 18 12:10





 18 12:10





- Radiology Data


Radiology results: report reviewed





<Isi Adams - Last Filed: 18 15:53>





- Lab Data


Result diagrams: 


 18 12:10





 18 12:10





<Antoni Guillen - Last Filed: 18 16:03>





- Medical Decision Making





This is a 58-year-old male with a history of lymphoma presents today with back 

pain increased agitation.  Recently changes medication for pain management.  

Patient has a history of Yisel-en-Y with biliary stent placement.  All of his 

lab work today was reviewed and unremarkable.  Due to the increased confusion 

CT of brain was completed.  No evidence of any acute process.  Patient was seen 

trying to move furniture around the room.  This is not his usual self.  Family 

reports they did see Charisma Tian NP yesterday who advised him to come in 

for further evaluation if he continue to persist.  Patient was given 2 mg of 

Ativan and pain management.  He then started to rest comfortably in bed. (

Isi Adams)





- Lab Data





 Lab Results











  18 Range/Units





  12:10 12:10 12:10 


 


WBC    2.9 L  (3.8-10.6)  k/uL


 


RBC    2.87 L  (4.30-5.90)  m/uL


 


Hgb    8.2 L  (13.0-17.5)  gm/dL


 


Hct    25.8 L  (39.0-53.0)  %


 


MCV    89.8  (80.0-100.0)  fL


 


MCH    28.7  (25.0-35.0)  pg


 


MCHC    31.9  (31.0-37.0)  g/dL


 


RDW    15.4  (11.5-15.5)  %


 


Plt Count    228  (150-450)  k/uL


 


Neutrophils %    79  %


 


Lymphocytes %    8  %


 


Monocytes %    9  %


 


Eosinophils %    1  %


 


Basophils %    0  %


 


Neutrophils #    2.2  (1.3-7.7)  k/uL


 


Lymphocytes #    0.2 L  (1.0-4.8)  k/uL


 


Monocytes #    0.3  (0-1.0)  k/uL


 


Eosinophils #    0.0  (0-0.7)  k/uL


 


Basophils #    0.0  (0-0.2)  k/uL


 


Hypochromasia    Marked  


 


Poikilocytosis    Slight  


 


PT     (9.0-12.0)  sec


 


INR     (<1.2)  


 


APTT     (22.0-30.0)  sec


 


Sodium   137   (137-145)  mmol/L


 


Potassium   4.0   (3.5-5.1)  mmol/L


 


Chloride   100   ()  mmol/L


 


Carbon Dioxide   27   (22-30)  mmol/L


 


Anion Gap   10   mmol/L


 


BUN   20   (9-20)  mg/dL


 


Creatinine   0.40 L   (0.66-1.25)  mg/dL


 


Est GFR (CKD-EPI)AfAm   >90   (>60 ml/min/1.73 sqM)  


 


Est GFR (CKD-EPI)NonAf   >90   (>60 ml/min/1.73 sqM)  


 


Glucose   123 H   (74-99)  mg/dL


 


Plasma Lactic Acid Len  1.1    (0.7-2.0)  mmol/L


 


Calcium   9.1   (8.4-10.2)  mg/dL


 


Total Bilirubin   1.0   (0.2-1.3)  mg/dL


 


AST   32   (17-59)  U/L


 


ALT   34   (21-72)  U/L


 


Alkaline Phosphatase   186 H   ()  U/L


 


Ammonia  <9    (<30)  umol/L


 


Total Protein   6.3   (6.3-8.2)  g/dL


 


Albumin   3.7   (3.5-5.0)  g/dL


 


Amylase   <30 L   ()  U/L


 


Lipase   15 L   ()  U/L


 


Urine Color     


 


Urine Appearance     (Clear)  


 


Urine pH     (5.0-8.0)  


 


Ur Specific Gravity     (1.001-1.035)  


 


Urine Protein     (Negative)  


 


Urine Glucose (UA)     (Negative)  


 


Urine Ketones     (Negative)  


 


Urine Blood     (Negative)  


 


Urine Nitrite     (Negative)  


 


Urine Bilirubin     (Negative)  


 


Urine Urobilinogen     (<2.0)  mg/dL


 


Ur Leukocyte Esterase     (Negative)  


 


Urine Opiates Screen     (NotDetected)  


 


Ur Oxycodone Screen     (NotDetected)  


 


Urine Methadone Screen     (NotDetected)  


 


Ur Propoxyphene Screen     (NotDetected)  


 


Ur Barbiturates Screen     (NotDetected)  


 


U Tricyclic Antidepress     (NotDetected)  


 


Ur Phencyclidine Scrn     (NotDetected)  


 


Ur Amphetamines Screen     (NotDetected)  


 


U Methamphetamines Scrn     (NotDetected)  


 


U Benzodiazepines Scrn     (NotDetected)  


 


Urine Cocaine Screen     (NotDetected)  


 


U Marijuana (THC) Screen     (NotDetected)  














  18 Range/Units





  12:10 12:10 12:10 


 


WBC     (3.8-10.6)  k/uL


 


RBC     (4.30-5.90)  m/uL


 


Hgb     (13.0-17.5)  gm/dL


 


Hct     (39.0-53.0)  %


 


MCV     (80.0-100.0)  fL


 


MCH     (25.0-35.0)  pg


 


MCHC     (31.0-37.0)  g/dL


 


RDW     (11.5-15.5)  %


 


Plt Count     (150-450)  k/uL


 


Neutrophils %     %


 


Lymphocytes %     %


 


Monocytes %     %


 


Eosinophils %     %


 


Basophils %     %


 


Neutrophils #     (1.3-7.7)  k/uL


 


Lymphocytes #     (1.0-4.8)  k/uL


 


Monocytes #     (0-1.0)  k/uL


 


Eosinophils #     (0-0.7)  k/uL


 


Basophils #     (0-0.2)  k/uL


 


Hypochromasia     


 


Poikilocytosis     


 


PT  10.5    (9.0-12.0)  sec


 


INR  1.1    (<1.2)  


 


APTT  29.2    (22.0-30.0)  sec


 


Sodium     (137-145)  mmol/L


 


Potassium     (3.5-5.1)  mmol/L


 


Chloride     ()  mmol/L


 


Carbon Dioxide     (22-30)  mmol/L


 


Anion Gap     mmol/L


 


BUN     (9-20)  mg/dL


 


Creatinine     (0.66-1.25)  mg/dL


 


Est GFR (CKD-EPI)AfAm     (>60 ml/min/1.73 sqM)  


 


Est GFR (CKD-EPI)NonAf     (>60 ml/min/1.73 sqM)  


 


Glucose     (74-99)  mg/dL


 


Plasma Lactic Acid Len     (0.7-2.0)  mmol/L


 


Calcium     (8.4-10.2)  mg/dL


 


Total Bilirubin     (0.2-1.3)  mg/dL


 


AST     (17-59)  U/L


 


ALT     (21-72)  U/L


 


Alkaline Phosphatase     ()  U/L


 


Ammonia     (<30)  umol/L


 


Total Protein     (6.3-8.2)  g/dL


 


Albumin     (3.5-5.0)  g/dL


 


Amylase     ()  U/L


 


Lipase     ()  U/L


 


Urine Color   Yellow   


 


Urine Appearance   Clear   (Clear)  


 


Urine pH   7.0   (5.0-8.0)  


 


Ur Specific Gravity   1.015   (1.001-1.035)  


 


Urine Protein   Trace H   (Negative)  


 


Urine Glucose (UA)   Negative   (Negative)  


 


Urine Ketones   Negative   (Negative)  


 


Urine Blood   Negative   (Negative)  


 


Urine Nitrite   Negative   (Negative)  


 


Urine Bilirubin   Negative   (Negative)  


 


Urine Urobilinogen   <2.0   (<2.0)  mg/dL


 


Ur Leukocyte Esterase   Negative   (Negative)  


 


Urine Opiates Screen    Detected H  (NotDetected)  


 


Ur Oxycodone Screen    Not Detected  (NotDetected)  


 


Urine Methadone Screen    Not Detected  (NotDetected)  


 


Ur Propoxyphene Screen    Not Detected  (NotDetected)  


 


Ur Barbiturates Screen    Not Detected  (NotDetected)  


 


U Tricyclic Antidepress    Not Detected  (NotDetected)  


 


Ur Phencyclidine Scrn    Not Detected  (NotDetected)  


 


Ur Amphetamines Screen    Not Detected  (NotDetected)  


 


U Methamphetamines Scrn    Not Detected  (NotDetected)  


 


U Benzodiazepines Scrn    Detected H  (NotDetected)  


 


Urine Cocaine Screen    Not Detected  (NotDetected)  


 


U Marijuana (THC) Screen    Detected H  (NotDetected)  














18 12:59


EKG shows sinus rhythm.  Normal EKG.  Ventricular rate of 99 bpm.  KY interval 

is 128.  QRS ration needs to.  QTQTC 56/456 ms. (Isi Adams)





- Radiology Data


Blunting of the left costophrenic angle.  Correlate for small pleural effusion 

or atelectasis.  Nonspecific abdomen similar to 2018.





Mild generalized atrophy.  No acute process seen.  Trace air-fluid level in the 

left maxillary sinus.  Correlating for symptoms of acute sinusitis. (Isi Adams)





Disposition


Is patient prescribed a controlled substance at d/c from ED?: No


When asked, does pt state using other controlled substances?: No


If prescribed controlled substance>3 days was MAPS reviewed?: No


If opioid is for acute pain is fill amount 7 days or less?: No


If Rx opioid, was Start Talking consent form obtained?: No


Time of Disposition: 15:55





<Isi Adams - Last Filed: 18 15:53>





<Antoni Guillen - Last Filed: 18 16:03>


Clinical Impression: 


 Confusion, Lymphoma, History of Yisel-en-Y gastric bypass, Pleural effusion





Disposition: ADMITTED AS IP TO THIS HOSP


Condition: Stable


Referrals: 


Ibrahima Wilkerson DO [Primary Care Provider] - 1-2 days

## 2018-07-26 LAB
ALBUMIN SERPL-MCNC: 3.2 G/DL (ref 3.5–5)
ALP SERPL-CCNC: 155 U/L (ref 38–126)
ALT SERPL-CCNC: 33 U/L (ref 21–72)
ANION GAP SERPL CALC-SCNC: 11 MMOL/L
AST SERPL-CCNC: 30 U/L (ref 17–59)
BASOPHILS # BLD AUTO: 0 K/UL (ref 0–0.2)
BASOPHILS NFR BLD AUTO: 0 %
BUN SERPL-SCNC: 9 MG/DL (ref 9–20)
CALCIUM SPEC-MCNC: 8.7 MG/DL (ref 8.4–10.2)
CHLORIDE SERPL-SCNC: 100 MMOL/L (ref 98–107)
CO2 SERPL-SCNC: 24 MMOL/L (ref 22–30)
EOSINOPHIL # BLD AUTO: 0 K/UL (ref 0–0.7)
EOSINOPHIL NFR BLD AUTO: 1 %
ERYTHROCYTE [DISTWIDTH] IN BLOOD BY AUTOMATED COUNT: 2.63 M/UL (ref 4.3–5.9)
ERYTHROCYTE [DISTWIDTH] IN BLOOD: 15.7 % (ref 11.5–15.5)
GLUCOSE SERPL-MCNC: 90 MG/DL (ref 74–99)
HCT VFR BLD AUTO: 23.7 % (ref 39–53)
HGB BLD-MCNC: 7.7 GM/DL (ref 13–17.5)
LYMPHOCYTES # SPEC AUTO: 0.2 K/UL (ref 1–4.8)
LYMPHOCYTES NFR SPEC AUTO: 8 %
MCH RBC QN AUTO: 29.2 PG (ref 25–35)
MCHC RBC AUTO-ENTMCNC: 32.4 G/DL (ref 31–37)
MCV RBC AUTO: 90.2 FL (ref 80–100)
MONOCYTES # BLD AUTO: 0.2 K/UL (ref 0–1)
MONOCYTES NFR BLD AUTO: 7 %
NEUTROPHILS # BLD AUTO: 1.8 K/UL (ref 1.3–7.7)
NEUTROPHILS NFR BLD AUTO: 82 %
PLATELET # BLD AUTO: 188 K/UL (ref 150–450)
POTASSIUM SERPL-SCNC: 3.8 MMOL/L (ref 3.5–5.1)
PROT SERPL-MCNC: 5.6 G/DL (ref 6.3–8.2)
SODIUM SERPL-SCNC: 135 MMOL/L (ref 137–145)
WBC # BLD AUTO: 2.2 K/UL (ref 3.8–10.6)

## 2018-07-26 PROCEDURE — 3E0436Z INTRODUCTION OF NUTRITIONAL SUBSTANCE INTO CENTRAL VEIN, PERCUTANEOUS APPROACH: ICD-10-PCS

## 2018-07-26 PROCEDURE — 02HV33Z INSERTION OF INFUSION DEVICE INTO SUPERIOR VENA CAVA, PERCUTANEOUS APPROACH: ICD-10-PCS

## 2018-07-26 RX ADMIN — PANTOPRAZOLE SODIUM SCH MG: 40 INJECTION, POWDER, FOR SOLUTION INTRAVENOUS at 07:16

## 2018-07-26 RX ADMIN — ONDANSETRON PRN MG: 2 INJECTION INTRAMUSCULAR; INTRAVENOUS at 09:14

## 2018-07-26 RX ADMIN — SODIUM CHLORIDE SCH MLS/HR: 9 INJECTION, SOLUTION INTRAVENOUS at 14:29

## 2018-07-26 RX ADMIN — PANTOPRAZOLE SODIUM SCH MG: 40 INJECTION, POWDER, FOR SOLUTION INTRAVENOUS at 20:46

## 2018-07-26 RX ADMIN — SODIUM CHLORIDE SCH MLS/HR: 9 INJECTION, SOLUTION INTRAVENOUS at 20:36

## 2018-07-26 RX ADMIN — PANTOPRAZOLE SODIUM SCH: 40 INJECTION, POWDER, FOR SOLUTION INTRAVENOUS at 05:38

## 2018-07-26 RX ADMIN — MEROPENEM SCH MLS/HR: 1 INJECTION, POWDER, FOR SOLUTION INTRAVENOUS at 07:15

## 2018-07-26 RX ADMIN — CEFAZOLIN SCH MLS/HR: 330 INJECTION, POWDER, FOR SOLUTION INTRAMUSCULAR; INTRAVENOUS at 14:29

## 2018-07-26 RX ADMIN — MEROPENEM SCH MLS/HR: 1 INJECTION, POWDER, FOR SOLUTION INTRAVENOUS at 17:35

## 2018-07-26 RX ADMIN — FLUCONAZOLE SCH MG: 100 TABLET ORAL at 07:16

## 2018-07-26 RX ADMIN — HEPARIN SODIUM SCH UNIT: 5000 INJECTION, SOLUTION INTRAVENOUS; SUBCUTANEOUS at 20:47

## 2018-07-26 RX ADMIN — HEPARIN SODIUM SCH: 5000 INJECTION, SOLUTION INTRAVENOUS; SUBCUTANEOUS at 05:38

## 2018-07-26 RX ADMIN — MORPHINE SULFATE PRN MG: 4 INJECTION, SOLUTION INTRAMUSCULAR; INTRAVENOUS at 09:15

## 2018-07-26 RX ADMIN — DOCUSATE SODIUM SCH MG: 100 CAPSULE, LIQUID FILLED ORAL at 07:16

## 2018-07-26 RX ADMIN — CEFAZOLIN SCH: 330 INJECTION, POWDER, FOR SOLUTION INTRAMUSCULAR; INTRAVENOUS at 17:36

## 2018-07-26 RX ADMIN — MEROPENEM SCH: 1 INJECTION, POWDER, FOR SOLUTION INTRAVENOUS at 05:39

## 2018-07-26 RX ADMIN — CEFAZOLIN SCH: 330 INJECTION, POWDER, FOR SOLUTION INTRAMUSCULAR; INTRAVENOUS at 05:39

## 2018-07-26 RX ADMIN — HEPARIN SODIUM SCH UNIT: 5000 INJECTION, SOLUTION INTRAVENOUS; SUBCUTANEOUS at 07:16

## 2018-07-26 NOTE — PN
PROGRESS NOTE



DATE OF SERVICE:

07/26/2018



INTERVAL HISTORY:

This 58-year-old gentleman admitted with change in mental status and diffuse 
aches and

pains and possible sepsis.  The patient also had a biliary drainage which 
appears to be

leaking and somewhat infected.  Patient also has non-Hodgkin's lymphoma.  The 
patient

remains confused.  Patient on IV broad-spectrum IV antibiotics.  Infectious 
Disease and

as well as Hematology-Oncology evaluation in progress.



PAST MEDICAL HISTORY:

Reviewed.



REVIEW OF SYSTEMS:

Could not be taken, the patient is confused.



CURRENT MEDICATIONS ARE:

Reviewed and include:

1. Tylenol 650 q.6 p.r.n.

2. Colace 100 mg.

3. Vitamin D 50,000 units.

4. Diflucan 100 mg daily.

5. Heparin 5000 units subcu b.i.d.

6. Motrin 400 mg.

7. Ultram 30 mg q.6 p.r.n.

8. Ativan 0.5 mg t.i.d. p.r.n.

9. Meropenem 2 g IV q.8h.

10.Narcan 0.2 q.2h p.r.n.

11.Zofran 4 mg.

12.Protonix 40 mg IV b.i.d.

13.IV fluids.



PHYSICAL EXAM:

The patient is conscious, confused.  Pulse is 101.  Blood pressure 92/60.  
Respirations

16, temperature 97.2, pulse ox 98% on room air.

HEENT:  Conjunctivae normal.  Oral mucosa moist.  Neck is no jugular venous 
distention.

No carotid bruit. No lymph node enlargement.

CARDIOVASCULAR:  S1, S2 muffled.

RESPIRATORY:  Breath sounds diminished in the bases.  A few scattered rhonchi 
and

crackles.

ABDOMEN:  Soft, status post biliary drainage.

CENTRAL NERVOUS SYSTEM: No focal deficits.  Mild diffuse weakness.



LAB STUDIES:

WBC 2.9, hemoglobin 7.7.  Platelets are normal.  Otherwise, albumin is 2.2, 
alkaline

phosphatase 155.  UA noted.  Drug screen is positive THC.



ASSESSMENT:

1. Change in mental status possible acute biliary sepsis with acute metabolic

    encephalopathy.

2. Non-Hodgkin's lymphoma, on Chemotherapy.

3. Possibly infected biliary drainage catheter.

4. Status post PICC line on the right arm.

5. Leukopenia.

6. Anemia secondary to chemotherapy.

8. History of partial gastrectomy, lysis of adhesions.

9. History of degenerative joint disease.

10.History of nicotine dependence.

11.FULL CODE.



RECOMMENDATIONS AND DISCUSSION:

Recommend to continue current medications, management and symptomatic treatment,

continue with broad-spectrum IV antibiotics.  Infectious Disease and as well as

Hematology-Oncology evaluation.  Otherwise I would also recommend follow the 
cultures.

Resume the medications. IV fluids have been changed and monitored.  Otherwise 
prognosis

guarded.  Discussed with Hematology/Oncology. We will contract with the surgeon 
in the

[place] who originally put the biliary tree. Next we will consult Interventional

Radiology for possible removal and reinsertion of a new biliary drainage 
catheter and

PICC line removal as well.  Prognosis guarded.  Further recommendations to 
follow.  CT

scan of the brain was done showed mild generalized atrophy.  No acute changes.





MMODL / IJN: [id number] / Job#: [id number]

MELIZA

## 2018-07-26 NOTE — P.CONS
History of Present Illness





- Reason for Consult


Consult date: 18


lymphoma history


Requesting physician: Isi Adams





- Chief Complaint


confusion, combativeness, intractable pain





- History of Present Illness





Mr. Park is a very pleasant  male pt of Dr. Pardo who presented in 

May 2017 with intermittent, mild abdominal pain and anemia, colonoscopy 17-

benign sigmoid polyp, CT CAP  irregular, thick fluid collection in upper 

abdomen suspicious for abscess, adenopathies noted.  17 had UGI and FL 

which revealed gastro-enteric fistula, 9/3 EGD and biopsies positive for acute 

inflammation.   had partial gastrectomy, path was benign.  He continued to 

have abdominal pain and anemia, repeat CT abd 17 revealed extensive 

adenopathies in the mesentery, encasing vasculature, measuring up to 

3.9x7.3x3.7cm, which progressed compared to prior CT scan. CT chest revealed 

non specific adenopathies.  Seen by Dr. Pardo for the 1st time 17 to 

discuss lymph node excision but he ended up being admitted to the hospital for 

pain and obstructio and transferred to [facility] for biliary drain 

tube placement.   biopsy of abdominal mass done, positive for high grade B 

cell lymphoma, RICO negative, BCL6 positive, non germinal center, FISH for MYC 

and BCL6 were negative but positive for BCL2, NM Bx and asp 12/1 negative.  Pt 

received first cycle of RCHOP at [facility] as inpatient, treatment f/u CT scan 

18 showed significant improvement in his lymphoma but, persistent fluid 

collection.  Completed 4 cycles of RCHOP 18.  Pt unfortunately had bowel 

perf and had prolonged hospitalization at [facility] and then rehab.  Pt on TPN.  He 

had f/u PET about 2 weeks ago with mixed finding, not suggestive of progression 

but possibly increased activity.  On 18 he came to the office with c/o, 

persistent stomach and back pain and nausea, persistent, he was in ER the night 

before for symptoms, he received pain meds and antiemetic with ok results but 

symptoms are back today, had no V, denied black or bloody stool, hematuria, 

wife noted bile drainage was darker, no change in odor, no fevers, pt was very 

irritable at that time.  We encouraged him to go to hospital but he refused so, 

he was hydrated and provided with antiemetic and antianxiety meds.  At home pt 

behavior worsened, he was "up and down", sleeping only a few hours at a time, 

wandering and trying to get into vehicle to drive.  His wife was concerned for 

his safety and hers, as she has not slept in several days. Pt seen today after 

MRI, which could not be completed as pt pulled biliary drain out.  Pt is 

lethargic, nearly obtunded, not answering questions.  





Review of Systems


ROS unobtainable: due to mental status





Past Medical History


Past Medical History: Cancer


Additional Past Medical History / Comment(s): 17 abdominal pain/anemia 

with surgical intervention, 10/2017 lymphoma diagnosed/abdominal masses at [facility] 

and pt started chemo there-1 session then 3 more sessions thru [facility]/Dr. Pardo, chemo caused severe abdominal pain and pt was diagnosed with perforated 

intestines d/t abdominal masses shrinkage/pulling on intestine-[facility] stated pt 

not surgical candidate and placed on antibiotics/long complicated 

hospitalization per spouse.


History of Any Multi-Drug Resistant Organisms: None Reported


Past Surgical History: Appendectomy, Orthopedic Surgery


Additional Past Surgical History / Comment(s): 17 laparoscopic partial 

gastrectomy with lysis of adhesions, reduction R inguinal hernia, resection 

gastroenteric fistula, gastrojujunostomy with reconstruction, EGDs/colonoscopy, 

ACL bilateral repairs, several bilateral knee arthroscopies, PICC line inserted 

17.


Past Anesthesia/Blood Transfusion Reactions: No Reported Reaction


Additional Past Anesthesia/Blood Transfusion Reaction / Comm: Pt has received 

blood without reaction.


Past Psychological History: No Psychological Hx Reported


Additional Psychological History / Comment(s):  lives with the wife.  

Has adult children.  Medically disabled.  No  experience.  No 

international travel.  No animals in the home.


Smoking Status: Former smoker


Past Alcohol Use History: None Reported


Additional Past Alcohol Use History / Comment(s): Pt states he was a light 

social smoker but has not smoked any cigarettes since 2017.  Pt states he has 

not had an alcoholic beverage in months, prior he drank more but never daily or 

abused.


Past Drug Use History: None Reported





- Past Family History


  ** Father


Family Medical History: Pneumonia


Additional Family Medical History / Comment(s): Father had anemia.  He  at 

the age of 79yrs from complications after a ingrown toenail was removed and 

then became infected and had to have amputation.





  ** Mother


Family Medical History: Dementia


Additional Family Medical History / Comment(s): Mother  of dementia at the 

age of 82 yrs.





Medications and Allergies


 Home Medications











 Medication  Instructions  Recorded  Confirmed  Type


 


Ergocalciferol [Vitamin D2 50,000 unit PO WE 01/15/18 07/25/18 History





(DRISDOL)]    


 


Insulin Lispro [humaLOG Kwikpen] See Protocol SQ ACHS 18 History


 


LORazepam [Ativan] 1 mg PO Q6H PRN 18 History


 


Omeprazole [PriLOSEC] 20 mg PO DAILY 18 History


 


Prochlorperazine [Compazine] 10 mg PO Q6H PRN 18 History


 


Sertraline [Zoloft] 100 mg PO DAILY 18 History


 


Morphine Sulfate Ir [MSIR] 15 mg PO Q4HR PRN #120 tablet 18 Rx


 


ALPRAZolam [Xanax] 0.25 mg PO DAILY PRN 18 History


 


Docusate [Colace] 100 mg PO DAILY 18 History


 


Dronabinol [Marinol] 2.5 mg PO AC-BID 18 History


 


Fluconazole [Diflucan] 100 mg PO DAILY 18 History


 


fentaNYL 100MCG/HR PATCH 1 patch TRANSDERM Q72H 18 History





[Duragesic 100MCG/HR]    











 Allergies











Allergy/AdvReac Type Severity Reaction Status Date / Time


 


medication for TB exposure Allergy  Unknown Uncoded 18 02:13














Physical Exam


Vitals: 


 Vital Signs











  Temp Pulse Pulse Resp BP BP Pulse Ox


 


 18 14:28  97.2 F L   101 H  16   92/60  98


 


 18 07:40    110 H  16   


 


 18 05:39    110 H  16   


 


 18 05:00  98.0 F   110 H  16   148/84  98


 


 18 21:13    112 H  16   


 


 18 21:10  97.0 F L   112 H  16   145/70  99


 


 18 18:21  98.9 F  94   17  152/81   98








 Intake and Output











 18





 06:59 14:59 22:59


 


Intake Total 590 360 


 


Output Total 200 2050 


 


Balance 390 -1690 


 


Intake:   


 


  Intake, IV Titration  200 





  Amount   


 


    Meropenem 2 gm In Sodium  200 





    Chloride 0.9% 100 ml @   





    200 mls/hr IVPB Q8H [facility]   





    Rx#:[id number]   


 


  Oral 590 160 


 


Output:   


 


  Gastric Drainage  300 


 


  Drainage  800 


 


    Lower Abdomen  800 


 


  Urine 200 950 


 


Other:   


 


  Voiding Method Urinal Urinal 


 


  Weight  66.678 kg 














- Constitutional





frail looking, muscle wasting, this is of sudden onset-last 2-3 weeks


General appearance: mild distress, thin





- EENT


Eyes: anicteric sclerae





- Neck


Neck: no lymphadenopathy





- Respiratory


Respiratory: bilateral: CTA (pt not following commmands to deep breathe)





- Cardiovascular


Rhythm: regular


Heart sounds: normal: S1, S2


  ** leg


Peripheral Edema: bilateral: None





- Gastrointestinal





epigastric biliary drain tube, copious cloudy, green fluid leaking around tube, 

foul odor, skin is not warm, abd is not rigid or bloated, deep palpation 

elicits wincing from pt


General gastrointestinal: no absent bowel sounds, no decreased bowel sounds, no 

distended, no hepatomegaly, no hyperactive bowel sounds, normal bowel sounds, 

no organomegaly, no rigid, no scaphoid, soft, no splenomegaly, tenderness, no 

umbilical hernia, no ventral hernia





- Integumentary


Integumentary: decreased turgor





- Musculoskeletal


Musculoskeletal: generalized weakness





- Psychiatric


Psychiatric: no A&O x's 3, no appropriate affect, no intact judgment & insight





Results


CBC & Chem 7: 


 18 06:38





 18 06:38


Labs: 


 Abnormal Lab Results - Last 24 Hours (Table)











  18 Range/Units





  06:38 06:38 


 


WBC  2.2 L   (3.8-10.6)  k/uL


 


RBC  2.63 L   (4.30-5.90)  m/uL


 


Hgb  7.7 L   (13.0-17.5)  gm/dL


 


Hct  23.7 L   (39.0-53.0)  %


 


RDW  15.7 H   (11.5-15.5)  %


 


Lymphocytes #  0.2 L   (1.0-4.8)  k/uL


 


Sodium   135 L  (137-145)  mmol/L


 


Creatinine   0.41 L  (0.66-1.25)  mg/dL


 


Alkaline Phosphatase   155 H  ()  U/L


 


Total Protein   5.6 L  (6.3-8.2)  g/dL


 


Albumin   3.2 L  (3.5-5.0)  g/dL








 Microbiology - Last 24 Hours (Table)











 18 12:10 Blood Culture Gram Stain - Preliminary





 Blood 


 


 18 12:10 Blood Culture - Final





 Blood 











Abdominal x-ray: report reviewed


CT Scan - head: report reviewed





Assessment and Plan


(1) Confusion


Narrative/Plan: 


Case discussed with IM.  Plan is to work up the biliary drain and treat for 

sepsis.  If no improvement in condition LP will be considered for evaluation of 

CFrule out CNS involvement.  


Pain medications have all been discontinued other then morphine IVP to decrease 

possible narcotic induced lethargy/confusion


ID, Surgery and IR consulted 





Current Visit: Yes   Status: Acute   Priority: High   Code(s): R41.0 - 

DISORIENTATION, UNSPECIFIED   SNOMED Code(s): 140897687


   





(2) B-cell lymphoma


Narrative/Plan: 


Pt recently had PET with mixed findings.  Plan was to follow pt and consider 

repeat scan or start treatment if pt was symptomatic.  Pt current condition has 

progressively worsened in the last 10-14 days. Cont current work up and 

treatment. Pt will be followed closely.   


Current Visit: No   Status: Chronic   Priority: High   Code(s): C85.10 - 

UNSPECIFIED B-CELL LYMPHOMA, UNSPECIFIED SITE   SNOMED Code(s): 391329407

## 2018-07-26 NOTE — P.CONS
History of Present Illness





- Reason for Consult


Consult date: 18





- Chief Complaint


weakness and fever





- History of Present Illness





58-year-old  male with a very extensive past medical history.  In 

2017 he was having abdominal pain and had extensive workup performed 

including a gastric resection with a Yisel-en-Y procedure.  Patient continue to 

have ongoing abdominal pain and follow-up imaging studies reveal evidence of 

extensive lymphadenopathy and when this was biopsy was found to be B-cell 

lymphoma.  The patient was initiated to 4 cycles of R CHOP chemotherapy.  First 

3 were well-tolerated however after the fourth cycle of chemotherapy he 

developed a bowel perforation and required an extensive course of treatment.  

The surgeons at the tertiary center were unable to perform a surgical 

intervention and percutaneous drainage was performed.  He does have a biliary 

drain remains in place at this point in time.  He has had significant weight 

loss and does maintain nutritional support via nighttime TPN.  The patient now 

has developed increasing abdominal pain.  Was seen in the oncology office 

without evidence of obstruction was given some hydration and sent home.  

However the pain increased greatlyand he was hospitalized pain medication was 

adjustedand 1 blood culture was positive for coagulase negative staph  No other 

positivecultures noted ecause of this the infectious disea The patient is 

arousable but obtuand the patient was discharged with   The patient now is 

admitted with altered mental status and concerns to further fever and sepsis.





Review of Systems


ROS unobtainable: due to mental status





Past Medical History


Past Medical History: Cancer


Additional Past Medical History / Comment(s): 17 abdominal pain/anemia 

with surgical intervention, 10/2017 lymphoma diagnosed/abdominal masses at [facility] 

and pt started chemo there-1 session then 3 more sessions thru [facility]/Dr. Pardo, chemo caused severe abdominal pain and pt was diagnosed with perforated 

intestines d/t abdominal masses shrinkage/pulling on intestine-[facility] stated pt 

not surgical candidate and placed on antibiotics/long complicated 

hospitalization per spouse.


History of Any Multi-Drug Resistant Organisms: None Reported


Past Surgical History: Appendectomy, Orthopedic Surgery


Additional Past Surgical History / Comment(s): 17 laparoscopic partial 

gastrectomy with lysis of adhesions, reduction R inguinal hernia, resection 

gastroenteric fistula, gastrojujunostomy with reconstruction, EGDs/colonoscopy, 

ACL bilateral repairs, several bilateral knee arthroscopies, PICC line inserted 

17.


Past Anesthesia/Blood Transfusion Reactions: No Reported Reaction


Additional Past Anesthesia/Blood Transfusion Reaction / Comm: Pt has received 

blood without reaction.


Past Psychological History: No Psychological Hx Reported


Additional Psychological History / Comment(s):  lives with the wife.  

Has adult children.  Medically disabled.  No  experience.  No 

international travel.  No animals in the home.


Smoking Status: Former smoker


Past Alcohol Use History: None Reported


Additional Past Alcohol Use History / Comment(s): Pt states he was a light 

social smoker but has not smoked any cigarettes since 2017.  Pt states he has 

not had an alcoholic beverage in months, prior he drank more but never daily or 

abused.


Past Drug Use History: None Reported





- Past Family History


  ** Father


Family Medical History: Pneumonia


Additional Family Medical History / Comment(s): Father had anemia.  He  at 

the age of 79yrs from complications after a ingrown toenail was removed and 

then became infected and had to have amputation.





  ** Mother


Family Medical History: Dementia


Additional Family Medical History / Comment(s): Mother  of dementia at the 

age of 82 yrs.





Medications and Allergies


Home Medications and Allergies Comment(s): 





 Current Medications





Acetaminophen (Tylenol Tab)  650 mg PO Q6HR PRN


   PRN Reason: Mild Pain or Fever > 100.5


Docusate Sodium (Colace)  100 mg PO DAILY On license of [facility]


   Last Admin: 18 07:16 Dose:  100 mg


Ergocalciferol (Vitamin D2)  50,000 unit PO WE On license of [facility]


   Last Admin: 18 05:38 Dose:  Not Given


Fluconazole (Diflucan)  100 mg PO DAILY On license of [facility]


   Last Admin: 18 07:16 Dose:  100 mg


Heparin Sodium (Porcine) (Heparin)  5,000 unit SQ Q12HR On license of [facility]


   Last Admin: 18 20:47 Dose:  5,000 unit


Sodium Chloride (Saline 0.9%)  1,000 mls @ 60 mls/hr IV .[id number] On license of [facility]


   Last Admin: 18 17:36 Dose:  Not Given


Meropenem 2 gm/ Sodium (Chloride)  100 mls @ 200 mls/hr IVPB Q8H On license of [facility]; Protocol


   Last Admin: 18 17:35 Dose:  200 mls/hr


Vancomycin HCl 1,250 mg/ (Sodium Chloride)  250 mls @ 125 mls/hr IVPB Q8HR@0400,

1200,2000 On license of [facility]


   Last Admin: 18 20:36 Dose:  125 mls/hr


Potassium Chloride 20 meq/Parenteral Vitamin Supplement 10 ml/ Thiamine HCl 100 

mg/Folic Acid 1 mg/ Sodium Chloride  1,021.2 mls @ 60 mls/hr IV .Q17H2M On license of [facility]


   Last Admin: 18 14:29 Dose:  60 mls/hr


Ibuprofen (Motrin)  400 mg PO Q6HR PRN


   PRN Reason: Mild Pain or Fever > 100.5


Ketorolac Tromethamine (Toradol)  30 mg IVP Q6HR PRN


   PRN Reason: Moderate Pain


   Stop: 18 15:57


Lorazepam (Ativan)  0.5 mg IV Q6HR PRN


   PRN Reason: Anxiety


   Last Admin: 18 18:24 Dose:  0.5 mg


Morphine Sulfate (Morphine Sulfate (Inj))  4 mg IV Q4HR PRN


   PRN Reason: Severe Pain


   Last Admin: 18 09:15 Dose:  4 mg


Naloxone HCl (Narcan)  0.2 mg IV Q2M PRN


   PRN Reason: Opioid Reversal


Ondansetron HCl (Zofran)  4 mg IVP Q8HR PRN


   PRN Reason: Nausea And Vomiting


   Last Admin: 18 09:14 Dose:  4 mg


Pantoprazole Sodium (Protonix)  40 mg IV BID On license of [facility]


   Last Admin: 18 20:46 Dose:  40 mg








 Home Medications











 Medication  Instructions  Recorded  Confirmed  Type


 


Ergocalciferol [Vitamin D2 50,000 unit PO WE 01/15/18 07/25/18 History





(DRISDOL)]    


 


Insulin Lispro [humaLOG Kwikpen] See Protocol SQ ACHS 18 History


 


LORazepam [Ativan] 1 mg PO Q6H PRN 18 History


 


Omeprazole [PriLOSEC] 20 mg PO DAILY 18 History


 


Prochlorperazine [Compazine] 10 mg PO Q6H PRN 18 History


 


Sertraline [Zoloft] 100 mg PO DAILY 18 History


 


Morphine Sulfate Ir [MSIR] 15 mg PO Q4HR PRN #120 tablet 18 Rx


 


ALPRAZolam [Xanax] 0.25 mg PO DAILY PRN 18 History


 


Docusate [Colace] 100 mg PO DAILY 18 History


 


Dronabinol [Marinol] 2.5 mg PO AC-BID 18 History


 


Fluconazole [Diflucan] 100 mg PO DAILY 18 History


 


fentaNYL 100MCG/HR PATCH 1 patch TRANSDERM Q72H 18 History





[Duragesic 100MCG/HR]    











 Allergies











Allergy/AdvReac Type Severity Reaction Status Date / Time


 


medication for TB exposure Allergy  Unknown Uncoded 18 02:13














Physical Exam


Vitals: 


 Vital Signs











  Temp Pulse Resp BP Pulse Ox


 


 18 20:30  99.3 F  110 H  18  110/71 


 


 18 17:20   101 H  16  


 


 18 14:28  97.2 F L  101 H  16  92/60  98


 


 18 07:40   110 H  16  


 


 18 05:39   110 H  16  


 


 18 05:00  98.0 F  110 H  16  148/84  98








 Intake and Output











 18





 14:59 22:59 06:59


 


Intake Total 360  


 


Output Total 2050 900 


 


Balance -1690 -900 


 


Intake:   


 


  Intake, IV Titration 200  





  Amount   


 


    Meropenem 2 gm In Sodium 200  





    Chloride 0.9% 100 ml @   





    200 mls/hr IVPB Q8H On license of [facility]   





    Rx#:[id number]   


 


  Oral 160  


 


Output:   


 


  Gastric Drainage 300  


 


  Drainage 800 900 


 


    Lower Abdomen 800 900 


 


  Urine 950  


 


Other:   


 


  Voiding Method Urinal Urinal 


 


  # Voids  1 


 


  # Bowel Movements  1 


 


  Weight 66.678 kg  














58-year-old  male who is underweight and chronically ill complains of 

ongoing abdominal pain


HEENT: Anicteric conjunctiva are pink and moist nasal mucosa grossly intact 

without significant lesions, there is no thrush.  Dentition is poor for age


Neck: The neck is supple without significant lymphadenopathy or thyromegaly.


Lungs: Good bilateral air entry without significant crackles or wheezing.  

There is no significant bronchial sounds.  There is no egophony or dullness.


Heart: Regular rate and rhythm with an audible S1-S2, no S3 no S4.  There is no 

significant murmur click or rub, PMI was nondisplaced.


Abdomen: Positive bowel sounds soft with some diffuse tenderness, the 

percutaneous drainage of biliary tract his ongoing drainage, splenomegaly is 

palpated, no other abdominal masses noted.  There was no guarding or rebound.  

The abdomen is not rigid


Extremities: The upper extremities have excellent pulses they are symmetric, no 

significant petechiae or telangiectasia.  No splinter hemorrhages were noted.  

The lower extremities are free from significant edema.  The peripheral pulses 

were 2+ and symmetric.  PICC line in place left upper extremity without 

difficulties


Neuro: patient is arousable but is non-conversational





Results


CBC & Chem 7: 


 18 06:38





 18 06:38


Labs: 


 Abnormal Lab Results - Last 24 Hours (Table)











  18 Range/Units





  06:38 06:38 


 


WBC  2.2 L   (3.8-10.6)  k/uL


 


RBC  2.63 L   (4.30-5.90)  m/uL


 


Hgb  7.7 L   (13.0-17.5)  gm/dL


 


Hct  23.7 L   (39.0-53.0)  %


 


RDW  15.7 H   (11.5-15.5)  %


 


Lymphocytes #  0.2 L   (1.0-4.8)  k/uL


 


Sodium   135 L  (137-145)  mmol/L


 


Creatinine   0.41 L  (0.66-1.25)  mg/dL


 


Alkaline Phosphatase   155 H  ()  U/L


 


Total Protein   5.6 L  (6.3-8.2)  g/dL


 


Albumin   3.2 L  (3.5-5.0)  g/dL








 Microbiology - Last 24 Hours (Table)











 18 12:10 Blood Culture Gram Stain - Preliminary





 Blood Blood Culture - Preliminary





    Coagulase Negative Staph


 


 18 12:45 Anaerobic Culture - Preliminary





 Abdomen 


 


 18 14:00 Catheter Tip Culture - Preliminary





 Picc Line 


 


 18 11:30 Wound Culture - Preliminary





 Abdomen 


 


 18 12:10 Blood Culture - Final





 Blood 








 Laboratory Results











WBC  2.2 k/uL (3.8-10.6)  L  18  06:38    


 


RBC  2.63 m/uL (4.30-5.90)  L  18  06:38    


 


Hgb  7.7 gm/dL (13.0-17.5)  L  18  06:38    


 


Hct  23.7 % (39.0-53.0)  L  18  06:38    


 


MCV  90.2 fL (80.0-100.0)   18  06:38    


 


MCH  29.2 pg (25.0-35.0)   18  06:38    


 


MCHC  32.4 g/dL (31.0-37.0)   18  06:38    


 


RDW  15.7 % (11.5-15.5)  H  18  06:38    


 


Plt Count  188 k/uL (150-450)   18  06:38    


 


Neutrophils %  82 %  18  06:38    


 


Lymphocytes %  8 %  18  06:38    


 


Monocytes %  7 %  18  06:38    


 


Eosinophils %  1 %  18  06:38    


 


Basophils %  0 %  18  06:38    


 


Neutrophils #  1.8 k/uL (1.3-7.7)   18  06:38    


 


Lymphocytes #  0.2 k/uL (1.0-4.8)  L  18  06:38    


 


Monocytes #  0.2 k/uL (0-1.0)   18  06:38    


 


Eosinophils #  0.0 k/uL (0-0.7)   18  06:38    


 


Basophils #  0.0 k/uL (0-0.2)   18  06:38    


 


Hypochromasia  Marked   18  06:38    


 


Poikilocytosis  Slight   18  06:38    


 


PT  10.5 sec (9.0-12.0)   18  12:10    


 


INR  1.1  (<1.2)   18  12:10    


 


APTT  29.2 sec (22.0-30.0)   18  12:10    


 


Sodium  135 mmol/L (137-145)  L  18  06:38    


 


Potassium  3.8 mmol/L (3.5-5.1)   18  06:38    


 


Chloride  100 mmol/L ()   18  06:38    


 


Carbon Dioxide  24 mmol/L (22-30)   18  06:38    


 


Anion Gap  11 mmol/L  18  06:38    


 


BUN  9 mg/dL (9-20)   18  06:38    


 


Creatinine  0.41 mg/dL (0.66-1.25)  L  18  06:38    


 


Est GFR (CKD-EPI)AfAm  >90  (>60 ml/min/1.73 sqM)   18  06:38    


 


Est GFR (CKD-EPI)NonAf  >90  (>60 ml/min/1.73 sqM)   18  06:38    


 


Glucose  90 mg/dL (74-99)   18  06:38    


 


Plasma Lactic Acid Len  1.1 mmol/L (0.7-2.0)   18  12:10    


 


Calcium  8.7 mg/dL (8.4-10.2)   18  06:38    


 


Total Bilirubin  1.0 mg/dL (0.2-1.3)   18  06:38    


 


AST  30 U/L (17-59)   18  06:38    


 


ALT  33 U/L (21-72)   18  06:38    


 


Alkaline Phosphatase  155 U/L ()  H  18  06:38    


 


Ammonia  <9 umol/L (<30)   18  12:10    


 


Total Protein  5.6 g/dL (6.3-8.2)  L  18  06:38    


 


Albumin  3.2 g/dL (3.5-5.0)  L  18  06:38    


 


Amylase  <30 U/L ()  L  18  12:10    


 


Lipase  15 U/L ()  L  18  12:10    


 


Urine Color  Yellow   18  12:10    


 


Urine Appearance  Clear  (Clear)   18  12:10    


 


Urine pH  7.0  (5.0-8.0)   18  12:10    


 


Ur Specific Gravity  1.015  (1.001-1.035)   18  12:10    


 


Urine Protein  Trace  (Negative)  H  18  12:10    


 


Urine Glucose (UA)  Negative  (Negative)   18  12:10    


 


Urine Ketones  Negative  (Negative)   18  12:10    


 


Urine Blood  Negative  (Negative)   18  12:10    


 


Urine Nitrite  Negative  (Negative)   18  12:10    


 


Urine Bilirubin  Negative  (Negative)   18  12:10    


 


Urine Urobilinogen  <2.0 mg/dL (<2.0)   18  12:10    


 


Ur Leukocyte Esterase  Negative  (Negative)   18  12:10    


 


Urine Opiates Screen  Detected  (NotDetected)  H  18  12:10    


 


Ur Oxycodone Screen  Not Detected  (NotDetected)   18  12:10    


 


Urine Methadone Screen  Not Detected  (NotDetected)   18  12:10    


 


Ur Propoxyphene Screen  Not Detected  (NotDetected)   18  12:10    


 


Ur Barbiturates Screen  Not Detected  (NotDetected)   18  12:10    


 


U Tricyclic Antidepress  Not Detected  (NotDetected)   18  12:10    


 


Ur Phencyclidine Scrn  Not Detected  (NotDetected)   18  12:10    


 


Ur Amphetamines Screen  Not Detected  (NotDetected)   18  12:10    


 


U Methamphetamines Scrn  Not Detected  (NotDetected)   18  12:10    


 


U Benzodiazepines Scrn  Detected  (NotDetected)  H  18  12:10    


 


Urine Cocaine Screen  Not Detected  (NotDetected)   18  12:10    


 


U Marijuana (THC) Screen  Detected  (NotDetected)  H  18  12:10    








 Microbiology





18 12:10   Blood   Blood Culture Gram Stain - Preliminary


18 12:10   Blood   Blood Culture - Preliminary


                            Coagulase Negative Staph


18 12:45   Abdomen   Anaerobic Culture - Preliminary


18 14:00   Picc Line   Catheter Tip Culture - Preliminary


18 11:30   Abdomen   Wound Culture - Preliminary


18 12:10   Blood   Blood Culture - Final











Assessment and Plan


(1) Altered mental status


Current Visit: Yes   Status: Acute   Code(s): R41.82 - ALTERED MENTAL STATUS, 

UNSPECIFIED   SNOMED Code(s): 640846260


   





(2) B-cell lymphoma


Current Visit: No   Status: Chronic   Priority: High   Code(s): C85.10 - 

UNSPECIFIED B-CELL LYMPHOMA, UNSPECIFIED SITE   SNOMED Code(s): 305401025


   





(3) Gram-positive cocci bacteremia


Narrative/Plan: 


58-year-old  male who has lymphoma has had recent hospitalization at 

which time there was evidence of gram-positive cocci in the blood cu  One 

culture had evidence of coagulase negative staph and was thought to be a 

contamination.  plans for outpatient exchange of the pICC line that is utilized 

for his TPN  this has now occurred and there is positive blood culture from the 

time of this occurrence.


Antibiotic therapy is with vancomycin at this point in time until final culture 

results.  Further cultures if febrile,  the patient does have new PICC left arm 

and will be monitored.Altered mental status is from  a combination of cancer, 

nutrition and infection. Prognosis is poor.


Current Visit: No   Status: Acute   Code(s): R78.81 - BACTEREMIA   SNOMED Code(s

): 944459619149

## 2018-07-26 NOTE — IR
PICC LINE PLACEMENT:

 

HISTORY:  Infection requiring long-term antibiotic therapy

 

PROCEDURE:  Ultrasound and fluoroscopic guidance of PICC line placement.

 

COMPLICATIONS:  None

 

ANESTHESIA:  1. 1% Lidocaine locally.

 

FINDINGS/TECHNIQUE:  The procedure was explained to the patient.  The risks, complications, benefits 
and alternatives were discussed and any questions were answered.  Informed consent was obtained.  The
 patient was placed supine on the fluoroscopic table and prepped and draped in the usual sterile [facility]
ion.  Utilizing a  21 gauge needle and sonographic and fluoroscopic guidance, access in the vein was 
achieved and there is placement of a 0.018 guidewire.  The vein is patent.  A 4-F sheath was placed o
harriett the guidewire.  The guidewire and dilator were removed and a 4-F. PICC line was placed through th
e sheath with the tip at the level of the SVC.  The sheath was removed, the catheter was flushed and 
sutured into position.  The patient was stable throughout the procedure and remained stable upon disc
harge from the Department of Radiology. 

 

The vein puncture was patent under ultrasound. A gray scale image was obtained to document patency of
 the vein punctured.

 

All elements of the maximal barrier technique were utilized.

 

FLUOROSCOPY TIME:  0.2 minutes, one image submitted

 

IMPRESSION: 

Successful PICC line placement under ultrasound and fluoroscopic guidance.

## 2018-07-27 LAB
ALBUMIN SERPL-MCNC: 3.2 G/DL (ref 3.5–5)
ALP SERPL-CCNC: 143 U/L (ref 38–126)
ALT SERPL-CCNC: 34 U/L (ref 21–72)
ANION GAP SERPL CALC-SCNC: 15 MMOL/L
AST SERPL-CCNC: 28 U/L (ref 17–59)
BASOPHILS # BLD AUTO: 0 K/UL (ref 0–0.2)
BASOPHILS NFR BLD AUTO: 1 %
BUN SERPL-SCNC: 19 MG/DL (ref 9–20)
CALCIUM SPEC-MCNC: 9.2 MG/DL (ref 8.4–10.2)
CHLORIDE SERPL-SCNC: 105 MMOL/L (ref 98–107)
CO2 SERPL-SCNC: 18 MMOL/L (ref 22–30)
EOSINOPHIL # BLD AUTO: 0.1 K/UL (ref 0–0.7)
EOSINOPHIL NFR BLD AUTO: 2 %
ERYTHROCYTE [DISTWIDTH] IN BLOOD BY AUTOMATED COUNT: 3.16 M/UL (ref 4.3–5.9)
ERYTHROCYTE [DISTWIDTH] IN BLOOD: 16.1 % (ref 11.5–15.5)
GLUCOSE BLD-MCNC: 102 MG/DL (ref 75–99)
GLUCOSE BLD-MCNC: 109 MG/DL (ref 75–99)
GLUCOSE BLD-MCNC: 73 MG/DL (ref 75–99)
GLUCOSE SERPL-MCNC: 65 MG/DL (ref 74–99)
HBA1C MFR BLD: 4.8 % (ref 4–6)
HCT VFR BLD AUTO: 29.2 % (ref 39–53)
HGB BLD-MCNC: 8.9 GM/DL (ref 13–17.5)
LYMPHOCYTES # SPEC AUTO: 0.3 K/UL (ref 1–4.8)
LYMPHOCYTES NFR SPEC AUTO: 8 %
MAGNESIUM SPEC-SCNC: 2 MG/DL (ref 1.6–2.3)
MCH RBC QN AUTO: 28.2 PG (ref 25–35)
MCHC RBC AUTO-ENTMCNC: 30.5 G/DL (ref 31–37)
MCV RBC AUTO: 92.3 FL (ref 80–100)
MONOCYTES # BLD AUTO: 0.4 K/UL (ref 0–1)
MONOCYTES NFR BLD AUTO: 9 %
NEUTROPHILS # BLD AUTO: 3.1 K/UL (ref 1.3–7.7)
NEUTROPHILS NFR BLD AUTO: 77 %
PLATELET # BLD AUTO: 261 K/UL (ref 150–450)
POTASSIUM SERPL-SCNC: 4.5 MMOL/L (ref 3.5–5.1)
PROT SERPL-MCNC: 5.6 G/DL (ref 6.3–8.2)
SODIUM SERPL-SCNC: 138 MMOL/L (ref 137–145)
TRIGL SERPL-MCNC: 170 MG/DL (ref ?–150)
WBC # BLD AUTO: 4.1 K/UL (ref 3.8–10.6)

## 2018-07-27 RX ADMIN — MEROPENEM SCH MLS/HR: 1 INJECTION, POWDER, FOR SOLUTION INTRAVENOUS at 09:00

## 2018-07-27 RX ADMIN — SODIUM CHLORIDE SCH: 9 INJECTION, SOLUTION INTRAVENOUS at 19:38

## 2018-07-27 RX ADMIN — MORPHINE SULFATE PRN MG: 4 INJECTION, SOLUTION INTRAMUSCULAR; INTRAVENOUS at 20:36

## 2018-07-27 RX ADMIN — SODIUM CHLORIDE SCH MLS/HR: 9 INJECTION, SOLUTION INTRAVENOUS at 19:28

## 2018-07-27 RX ADMIN — FLUCONAZOLE SCH MG: 100 TABLET ORAL at 08:26

## 2018-07-27 RX ADMIN — MORPHINE SULFATE PRN MG: 4 INJECTION, SOLUTION INTRAMUSCULAR; INTRAVENOUS at 16:04

## 2018-07-27 RX ADMIN — SODIUM CHLORIDE SCH MLS/HR: 9 INJECTION, SOLUTION INTRAVENOUS at 19:37

## 2018-07-27 RX ADMIN — HEPARIN SODIUM SCH: 5000 INJECTION, SOLUTION INTRAVENOUS; SUBCUTANEOUS at 08:25

## 2018-07-27 RX ADMIN — MORPHINE SULFATE PRN MG: 4 INJECTION, SOLUTION INTRAMUSCULAR; INTRAVENOUS at 23:53

## 2018-07-27 RX ADMIN — CEFAZOLIN SCH MLS/HR: 330 INJECTION, POWDER, FOR SOLUTION INTRAMUSCULAR; INTRAVENOUS at 11:52

## 2018-07-27 RX ADMIN — MEROPENEM SCH MLS/HR: 1 INJECTION, POWDER, FOR SOLUTION INTRAVENOUS at 00:58

## 2018-07-27 RX ADMIN — MORPHINE SULFATE PRN MG: 4 INJECTION, SOLUTION INTRAMUSCULAR; INTRAVENOUS at 11:08

## 2018-07-27 RX ADMIN — HEPARIN SODIUM SCH UNIT: 5000 INJECTION, SOLUTION INTRAVENOUS; SUBCUTANEOUS at 20:36

## 2018-07-27 RX ADMIN — CEFAZOLIN SCH: 330 INJECTION, POWDER, FOR SOLUTION INTRAMUSCULAR; INTRAVENOUS at 11:53

## 2018-07-27 RX ADMIN — DOCUSATE SODIUM SCH: 100 CAPSULE, LIQUID FILLED ORAL at 08:25

## 2018-07-27 RX ADMIN — MEROPENEM SCH MLS/HR: 1 INJECTION, POWDER, FOR SOLUTION INTRAVENOUS at 18:29

## 2018-07-27 RX ADMIN — PANTOPRAZOLE SODIUM SCH MG: 40 INJECTION, POWDER, FOR SOLUTION INTRAVENOUS at 08:26

## 2018-07-27 RX ADMIN — CEFAZOLIN SCH: 330 INJECTION, POWDER, FOR SOLUTION INTRAMUSCULAR; INTRAVENOUS at 05:14

## 2018-07-27 RX ADMIN — SOYBEAN OIL SCH MLS/HR: 20 INJECTION, SOLUTION INTRAVENOUS at 16:07

## 2018-07-27 RX ADMIN — MORPHINE SULFATE PRN MG: 4 INJECTION, SOLUTION INTRAMUSCULAR; INTRAVENOUS at 02:06

## 2018-07-27 RX ADMIN — SODIUM CHLORIDE SCH MLS/HR: 9 INJECTION, SOLUTION INTRAVENOUS at 04:09

## 2018-07-27 RX ADMIN — PANTOPRAZOLE SODIUM SCH MG: 40 INJECTION, POWDER, FOR SOLUTION INTRAVENOUS at 20:36

## 2018-07-27 NOTE — P.PN
Subjective


Progress Note Date: 07/27/18





58-year-old  male with a very extensive past medical history.  In 

September 2017 he was having abdominal pain and had extensive workup performed 

including a gastric resection with a Yisel-en-Y procedure.  Patient continue to 

have ongoing abdominal pain and follow-up imaging studies reveal evidence of 

extensive lymphadenopathy and when this was biopsy was found to be B-cell 

lymphoma.  The patient was initiated to 4 cycles of R CHOP chemotherapy.  First 

3 were well-tolerated however after the fourth cycle of chemotherapy he 

developed a bowel perforation and required an extensive course of treatment.  

The surgeons at the tertiary center were unable to perform a surgical 

intervention and percutaneous drainage was performed.  He does have a biliary 

drain remains in place at this point in time.  He has had significant weight 

loss and does maintain nutritional support via nighttime TPN.  The patient now 

has developed increasing abdominal pain.  Was seen in the oncology office 

without evidence of obstruction was given some hydration and sent home.  

However the pain increased greatlyand he was hospitalized pain medication was 

adjusted and 1 blood culture was positive for coagulase negative staph  No 

other positive cultures noted because of this the infectious disea The patient 

is arousable but obtunded the patient was discharged with   The patient now is 

admitted with altered mental status and concerns to further fever and sepsis.


07/27/2018 the patient is feeling somewhat better.  He is actually gone up in 

the hallway today because he is feeling much better his mentation has improved.

  The patient's wife is present and is asking what is his nutritional status 

such as he allowed to eat.





Objective





- Vital Signs


Vital signs: 


 Vital Signs











Temp  97.9 F   07/27/18 15:03


 


Pulse  91   07/27/18 16:20


 


Resp  16   07/27/18 16:20


 


BP  110/68   07/27/18 15:03


 


Pulse Ox  99   07/27/18 15:03








 Intake & Output











 07/27/18 07/27/18 07/28/18





 06:59 18:59 06:59


 


Intake Total 1170 0 


 


Output Total 550 350 


 


Balance 620 -350 


 


Weight  66.678 kg 


 


Intake:   


 


  Intake, IV Titration 1170  





  Amount   


 


    Meropenem 2 gm In Sodium 200  





    Chloride 0.9% 100 ml @   





    200 mls/hr IVPB Q8H CATAILNA   





    Rx#:[id number]   


 


    Sodium Chloride 0.9% 1, 720  





    000 ml @ 60 mls/hr IV .   





    Q17H2M [facility] with Potassium   





    Chloride 20 meq with Mvi   





    , Adult No.4 with Vit K   





    10 ml with Thiamine 100   





    mg with Folic Acid 1 mg   





    Rx#:[id number]   


 


    Vancomycin 1,250 mg In 250  





    Sodium Chloride 0.9% 250   





    ml @ 125 mls/hr IVPB Q8HR   





    @0400,1200,2000 CATAL[facility] Rx#:   





    [id number]   


 


  Oral  0 


 


Output:   


 


  Gastric Drainage  350 


 


  Drainage 550  


 


    Lower Abdomen 550  


 


Other:   


 


  Voiding Method Bedside Commode Toilet 





 Urinal  


 


  # Voids 1  


 


  # Bowel Movements 1  














- Exam


58-year-old  male who is underweight and chronically ill complains of 

ongoing abdominal pain


HEENT: Anicteric conjunctiva are pink and moist nasal mucosa grossly intact 

without significant lesions, there is no thrush.  Dentition is poor for age


Neck: The neck is supple without significant lymphadenopathy or thyromegaly.


Lungs: Good bilateral air entry without significant crackles or wheezing.  

There is no significant bronchial sounds.  There is no egophony or dullness.


Heart: Regular rate and rhythm with an audible S1-S2, no S3 no S4.  There is no 

significant murmur click or rub, PMI was nondisplaced.


Abdomen: Positive bowel sounds soft with some diffuse tenderness, the 

percutaneous drainage of biliary tract his ongoing drainage, splenomegaly is 

palpated, no other abdominal masses noted.  There was no guarding or rebound.  

The abdomen is not rigid but there is irritation to the skin around the tube 

that is having some significant drainage.


Extremities: The upper extremities have excellent pulses they are symmetric, no 

significant petechiae or telangiectasia.  No splinter hemorrhages were noted.  

The lower extremities are free from significant edema.  The peripheral pulses 

were 2+ and symmetric.  PICC line in place left upper extremity without 

difficulties


Neuro: patient is now awake alert interactive and is unable to ambulate





- Labs


CBC & Chem 7: 


 07/27/18 07:00





 07/27/18 07:00


Labs: 


 Abnormal Lab Results - Last 24 Hours (Table)











  07/27/18 07/27/18 07/27/18 Range/Units





  07:00 07:00 11:34 


 


RBC  3.16 L    (4.30-5.90)  m/uL


 


Hgb  8.9 L    (13.0-17.5)  gm/dL


 


Hct  29.2 L    (39.0-53.0)  %


 


MCHC  30.5 L    (31.0-37.0)  g/dL


 


RDW  16.1 H    (11.5-15.5)  %


 


Lymphocytes #  0.3 L    (1.0-4.8)  k/uL


 


Carbon Dioxide   18 L   (22-30)  mmol/L


 


Glucose   65 L   (74-99)  mg/dL


 


POC Glucose (mg/dL)    73 L  (75-99)  mg/dL


 


Phosphorus   5.3 H   (2.5-4.5)  mg/dL


 


Total Bilirubin   1.4 H   (0.2-1.3)  mg/dL


 


Alkaline Phosphatase   143 H   ()  U/L


 


Total Protein   5.6 L   (6.3-8.2)  g/dL


 


Albumin   3.2 L   (3.5-5.0)  g/dL


 


Triglycerides   170 H   (<150)  mg/dL














  07/27/18 07/27/18 Range/Units





  17:12 19:49 


 


RBC    (4.30-5.90)  m/uL


 


Hgb    (13.0-17.5)  gm/dL


 


Hct    (39.0-53.0)  %


 


MCHC    (31.0-37.0)  g/dL


 


RDW    (11.5-15.5)  %


 


Lymphocytes #    (1.0-4.8)  k/uL


 


Carbon Dioxide    (22-30)  mmol/L


 


Glucose    (74-99)  mg/dL


 


POC Glucose (mg/dL)  109 H  102 H  (75-99)  mg/dL


 


Phosphorus    (2.5-4.5)  mg/dL


 


Total Bilirubin    (0.2-1.3)  mg/dL


 


Alkaline Phosphatase    ()  U/L


 


Total Protein    (6.3-8.2)  g/dL


 


Albumin    (3.5-5.0)  g/dL


 


Triglycerides    (<150)  mg/dL








 Microbiology - Last 24 Hours (Table)











 07/26/18 14:00 Catheter Tip Culture - Preliminary





 Picc Line 


 


 07/26/18 11:30 Gram Stain - Preliminary





 Abdomen Wound Culture - Preliminary





    Gram Neg Bacilli


 


 07/25/18 12:10 Blood Culture Gram Stain - Preliminary





 Blood Blood Culture - Preliminary





    Coagulase Negative Staph


 


 07/26/18 12:45 Anaerobic Culture - Preliminary





 Abdomen 








 Laboratory Results











WBC  4.1 k/uL (3.8-10.6)   07/27/18  07:00    


 


RBC  3.16 m/uL (4.30-5.90)  L  07/27/18  07:00    


 


Hgb  8.9 gm/dL (13.0-17.5)  L  07/27/18  07:00    


 


Hct  29.2 % (39.0-53.0)  L  07/27/18  07:00    


 


MCV  92.3 fL (80.0-100.0)   07/27/18  07:00    


 


MCH  28.2 pg (25.0-35.0)   07/27/18  07:00    


 


MCHC  30.5 g/dL (31.0-37.0)  L  07/27/18  07:00    


 


RDW  16.1 % (11.5-15.5)  H  07/27/18  07:00    


 


Plt Count  261 k/uL (150-450)   07/27/18  07:00    


 


Neutrophils %  77 %  07/27/18  07:00    


 


Lymphocytes %  8 %  07/27/18  07:00    


 


Monocytes %  9 %  07/27/18  07:00    


 


Eosinophils %  2 %  07/27/18  07:00    


 


Basophils %  1 %  07/27/18  07:00    


 


Neutrophils #  3.1 k/uL (1.3-7.7)   07/27/18  07:00    


 


Lymphocytes #  0.3 k/uL (1.0-4.8)  L  07/27/18  07:00    


 


Monocytes #  0.4 k/uL (0-1.0)   07/27/18  07:00    


 


Eosinophils #  0.1 k/uL (0-0.7)   07/27/18  07:00    


 


Basophils #  0.0 k/uL (0-0.2)   07/27/18  07:00    


 


Hypochromasia  Marked   07/27/18  07:00    


 


Poikilocytosis  Slight   07/27/18  07:00    


 


Anisocytosis  Slight   07/27/18  07:00    


 


PT  10.5 sec (9.0-12.0)   07/25/18  12:10    


 


INR  1.1  (<1.2)   07/25/18  12:10    


 


APTT  29.2 sec (22.0-30.0)   07/25/18  12:10    


 


Sodium  138 mmol/L (137-145)   07/27/18  07:00    


 


Potassium  4.5 mmol/L (3.5-5.1)   07/27/18  07:00    


 


Chloride  105 mmol/L ()   07/27/18  07:00    


 


Carbon Dioxide  18 mmol/L (22-30)  L  07/27/18  07:00    


 


Anion Gap  15 mmol/L  07/27/18  07:00    


 


BUN  19 mg/dL (9-20)   07/27/18  07:00    


 


Creatinine  1.02 mg/dL (0.66-1.25)   07/27/18  07:00    


 


Est GFR (CKD-EPI)AfAm  >90  (>60 ml/min/1.73 sqM)   07/27/18  07:00    


 


Est GFR (CKD-EPI)NonAf  82  (>60 ml/min/1.73 sqM)   07/27/18  07:00    


 


Glucose  65 mg/dL (74-99)  L  07/27/18  07:00    


 


POC Glucose (mg/dL)  102 mg/dL (75-99)  H  07/27/18  19:49    


 


POC Glu Operater Li Yao   07/27/18  19:49    


 


Plasma Lactic Acid Len  1.1 mmol/L (0.7-2.0)   07/25/18  12:10    


 


Calcium  9.2 mg/dL (8.4-10.2)   07/27/18  07:00    


 


Phosphorus  5.3 mg/dL (2.5-4.5)  H  07/27/18  07:00    


 


Magnesium  2.0 mg/dL (1.6-2.3)   07/27/18  07:00    


 


Total Bilirubin  1.4 mg/dL (0.2-1.3)  H  07/27/18  07:00    


 


AST  28 U/L (17-59)   07/27/18  07:00    


 


ALT  34 U/L (21-72)   07/27/18  07:00    


 


Alkaline Phosphatase  143 U/L ()  H  07/27/18  07:00    


 


Ammonia  <9 umol/L (<30)   07/25/18  12:10    


 


Total Protein  5.6 g/dL (6.3-8.2)  L  07/27/18  07:00    


 


Albumin  3.2 g/dL (3.5-5.0)  L  07/27/18  07:00    


 


Triglycerides  170 mg/dL (<150)  H  07/27/18  07:00    


 


Amylase  <30 U/L ()  L  07/25/18  12:10    


 


Lipase  15 U/L ()  L  07/25/18  12:10    


 


Urine Color  Yellow   07/25/18  12:10    


 


Urine Appearance  Clear  (Clear)   07/25/18  12:10    


 


Urine pH  7.0  (5.0-8.0)   07/25/18  12:10    


 


Ur Specific Gravity  1.015  (1.001-1.035)   07/25/18  12:10    


 


Urine Protein  Trace  (Negative)  H  07/25/18  12:10    


 


Urine Glucose (UA)  Negative  (Negative)   07/25/18  12:10    


 


Urine Ketones  Negative  (Negative)   07/25/18  12:10    


 


Urine Blood  Negative  (Negative)   07/25/18  12:10    


 


Urine Nitrite  Negative  (Negative)   07/25/18  12:10    


 


Urine Bilirubin  Negative  (Negative)   07/25/18  12:10    


 


Urine Urobilinogen  <2.0 mg/dL (<2.0)   07/25/18  12:10    


 


Ur Leukocyte Esterase  Negative  (Negative)   07/25/18  12:10    


 


Urine Opiates Screen  Detected  (NotDetected)  H  07/25/18  12:10    


 


Ur Oxycodone Screen  Not Detected  (NotDetected)   07/25/18  12:10    


 


Urine Methadone Screen  Not Detected  (NotDetected)   07/25/18  12:10    


 


Ur Propoxyphene Screen  Not Detected  (NotDetected)   07/25/18  12:10    


 


Ur Barbiturates Screen  Not Detected  (NotDetected)   07/25/18  12:10    


 


U Tricyclic Antidepress  Not Detected  (NotDetected)   07/25/18  12:10    


 


Ur Phencyclidine Scrn  Not Detected  (NotDetected)   07/25/18  12:10    


 


Ur Amphetamines Screen  Not Detected  (NotDetected)   07/25/18  12:10    


 


U Methamphetamines Scrn  Not Detected  (NotDetected)   07/25/18  12:10    


 


U Benzodiazepines Scrn  Detected  (NotDetected)  H  07/25/18  12:10    


 


Urine Cocaine Screen  Not Detected  (NotDetected)   07/25/18  12:10    


 


U Marijuana (THC) Screen  Detected  (NotDetected)  H  07/25/18  12:10    








 Microbiology





07/26/18 14:00   Picc Line   Catheter Tip Culture - Preliminary


07/26/18 11:30   Abdomen   Gram Stain - Preliminary


07/26/18 11:30   Abdomen   Wound Culture - Preliminary


                            Gram Neg Bacilli


07/25/18 12:10   Blood   Blood Culture Gram Stain - Preliminary


07/25/18 12:10   Blood   Blood Culture - Preliminary


                            Coagulase Negative Staph


07/26/18 12:45   Abdomen   Anaerobic Culture - Preliminary


07/25/18 12:10   Blood   Blood Culture - Final











Assessment and Plan


(1) Altered mental status


Current Visit: Yes   Status: Acute   Code(s): R41.82 - ALTERED MENTAL STATUS, 

UNSPECIFIED   SNOMED Code(s): 924053095


   





(2) B-cell lymphoma


Current Visit: No   Status: Chronic   Priority: High   Code(s): C85.10 - 

UNSPECIFIED B-CELL LYMPHOMA, UNSPECIFIED SITE   SNOMED Code(s): 332026689


   





(3) Gram-positive cocci bacteremia


Narrative/Plan: 


58-year-old  male who has lymphoma has had recent hospitalization at 

which time there was evidence of gram-positive cocci in the blood cu  One 

culture had evidence of coagulase negative staph and was thought to be a 

contamination.  plans for outpatient exchange of the pICC line that is utilized 

for his TPN  this has now occurred and there is positive blood culture from the 

time of this occurrence.


Antibiotic therapy is with vancomycin at this point in time until final culture 

results.  Further cultures if febrile,  the patient does have new PICC left arm 

and will be monitored.Altered mental status is from  a combination of cancer, 

nutrition and infection. Prognosis is poor.


07/27/2018 the patient is improved since coming to Hospital, receiving fluids, 

antibiotic therapy, starting of his TPN and exchange of his PICC line.  

Antibiotic therapy is currently with Merrem and vancomycin antifungal therapy 

with fluconazole while cultures are pending.


Query to the surgeon is whether not the patient is allowed to ingest any food 

or fluids.


Current Visit: No   Status: Acute   Code(s): R78.81 - BACTEREMIA   SNOMED Code(s

): 796086291831

## 2018-07-27 NOTE — P.GSCN
History of Present Illness


Consult date: 18


Reason for Consult: 





Drainage tube leaking bile


History of present illness: 





This a 58-year-old male who is known to Dr. Baker.  Patient was admitted to 

the hospital for lymphoma.  Patient has.  History of gastrectomy with Yisel-en-Y 

reconstruction.  He has a radiologically placed drain into the defunctionalized 

stomach which is draining bile.  Patient has evidence of some bile leakage 

around the tube causing dermatitis.





Patient is unable to give any significant medical history.





Past Medical History


Past Medical History: Cancer


Additional Past Medical History / Comment(s): 17 abdominal pain/anemia 

with surgical intervention, 10/2017 lymphoma diagnosed/abdominal masses at [facility] 

and pt started chemo there-1 session then 3 more sessions thru [facility]/Dr. Pardo, chemo caused severe abdominal pain and pt was diagnosed with perforated 

intestines d/t abdominal masses shrinkage/pulling on intestine-[facility] stated pt 

not surgical candidate and placed on antibiotics/long complicated 

hospitalization per spouse.


History of Any Multi-Drug Resistant Organisms: None Reported


Past Surgical History: Appendectomy, Orthopedic Surgery


Additional Past Surgical History / Comment(s): 17 laparoscopic partial 

gastrectomy with lysis of adhesions, reduction R inguinal hernia, resection 

gastroenteric fistula, gastrojujunostomy with reconstruction, EGDs/colonoscopy, 

ACL bilateral repairs, several bilateral knee arthroscopies, PICC line inserted 

17.


Past Anesthesia/Blood Transfusion Reactions: No Reported Reaction


Additional Past Anesthesia/Blood Transfusion Reaction / Comm: Pt has received 

blood without reaction.


Past Psychological History: No Psychological Hx Reported


Additional Psychological History / Comment(s):  lives with the wife.  

Has adult children.  Medically disabled.  No  experience.  No 

international travel.  No animals in the home.


Smoking Status: Former smoker


Past Alcohol Use History: None Reported


Additional Past Alcohol Use History / Comment(s): Pt states he was a light 

social smoker but has not smoked any cigarettes since 2017.  Pt states he has 

not had an alcoholic beverage in months, prior he drank more but never daily or 

abused.


Past Drug Use History: None Reported





- Past Family History


  ** Father


Family Medical History: Pneumonia


Additional Family Medical History / Comment(s): Father had anemia.  He  at 

the age of 79yrs from complications after a ingrown toenail was removed and 

then became infected and had to have amputation.





  ** Mother


Family Medical History: Dementia


Additional Family Medical History / Comment(s): Mother  of dementia at the 

age of 82 yrs.





Medications and Allergies


 Home Medications











 Medication  Instructions  Recorded  Confirmed  Type


 


Ergocalciferol [Vitamin D2 50,000 unit PO WE 01/15/18 07/25/18 History





(DRISDOL)]    


 


Insulin Lispro [humaLOG Kwikpen] See Protocol SQ ACHS 18 History


 


LORazepam [Ativan] 1 mg PO Q6H PRN 18 History


 


Omeprazole [PriLOSEC] 20 mg PO DAILY 18 History


 


Prochlorperazine [Compazine] 10 mg PO Q6H PRN 18 History


 


Sertraline [Zoloft] 100 mg PO DAILY 18 History


 


Morphine Sulfate Ir [MSIR] 15 mg PO Q4HR PRN #120 tablet 18 Rx


 


ALPRAZolam [Xanax] 0.25 mg PO DAILY PRN 18 History


 


Docusate [Colace] 100 mg PO DAILY 18 History


 


Dronabinol [Marinol] 2.5 mg PO AC-BID 18 History


 


Fluconazole [Diflucan] 100 mg PO DAILY 18 History


 


fentaNYL 100MCG/HR PATCH 1 patch TRANSDERM Q72H 18 History





[Duragesic 100MCG/HR]    











 Allergies











Allergy/AdvReac Type Severity Reaction Status Date / Time


 


medication for TB exposure Allergy  Unknown Uncoded 18 02:13














Surgical - Exam


 Vital Signs











Temp Pulse Resp BP Pulse Ox


 


 97.6 F   105 H  20   129/80   98 


 


 18 11:25  18 11:25  18 11:25  18 11:25  18 11:25














- General


no distress





- Abdomen





Patient has a radiated logic drain in the epigastric area.  It is connected to 

a bag with significant upward of bile.  There is some contact dermatitis of the 

skin due to bile leaking around the tube.


Abdomen: soft





Results





- Labs





 18 07:00





 18 07:00


 Abnormal Lab Results - Last 24 Hours (Table)











  18 Range/Units





  07:00 07:00 11:34 


 


RBC  3.16 L    (4.30-5.90)  m/uL


 


Hgb  8.9 L    (13.0-17.5)  gm/dL


 


Hct  29.2 L    (39.0-53.0)  %


 


MCHC  30.5 L    (31.0-37.0)  g/dL


 


RDW  16.1 H    (11.5-15.5)  %


 


Lymphocytes #  0.3 L    (1.0-4.8)  k/uL


 


Carbon Dioxide   18 L   (22-30)  mmol/L


 


Glucose   65 L   (74-99)  mg/dL


 


POC Glucose (mg/dL)    73 L  (75-99)  mg/dL


 


Phosphorus   5.3 H   (2.5-4.5)  mg/dL


 


Total Bilirubin   1.4 H   (0.2-1.3)  mg/dL


 


Alkaline Phosphatase   143 H   ()  U/L


 


Total Protein   5.6 L   (6.3-8.2)  g/dL


 


Albumin   3.2 L   (3.5-5.0)  g/dL


 


Triglycerides   170 H   (<150)  mg/dL














  18 Range/Units





  17:12 


 


RBC   (4.30-5.90)  m/uL


 


Hgb   (13.0-17.5)  gm/dL


 


Hct   (39.0-53.0)  %


 


MCHC   (31.0-37.0)  g/dL


 


RDW   (11.5-15.5)  %


 


Lymphocytes #   (1.0-4.8)  k/uL


 


Carbon Dioxide   (22-30)  mmol/L


 


Glucose   (74-99)  mg/dL


 


POC Glucose (mg/dL)  109 H  (75-99)  mg/dL


 


Phosphorus   (2.5-4.5)  mg/dL


 


Total Bilirubin   (0.2-1.3)  mg/dL


 


Alkaline Phosphatase   ()  U/L


 


Total Protein   (6.3-8.2)  g/dL


 


Albumin   (3.5-5.0)  g/dL


 


Triglycerides   (<150)  mg/dL








 Microbiology - Last 24 Hours (Table)











 18 14:00 Catheter Tip Culture - Preliminary





 Picc Line 


 


 18 11:30 Gram Stain - Preliminary





 Abdomen Wound Culture - Preliminary





    Gram Neg Bacilli


 


 18 12:10 Blood Culture Gram Stain - Preliminary





 Blood Blood Culture - Preliminary





    Coagulase Negative Staph


 


 18 12:45 Anaerobic Culture - Preliminary





 Abdomen 








 Diabetes panel











  18 Range/Units





  07:00 


 


Sodium  138  (137-145)  mmol/L


 


Potassium  4.5  (3.5-5.1)  mmol/L


 


Chloride  105  ()  mmol/L


 


Carbon Dioxide  18 L  (22-30)  mmol/L


 


BUN  19  (9-20)  mg/dL


 


Creatinine  1.02  (0.66-1.25)  mg/dL


 


Glucose  65 L  (74-99)  mg/dL


 


Calcium  9.2  (8.4-10.2)  mg/dL


 


AST  28  (17-59)  U/L


 


ALT  34  (21-72)  U/L


 


Alkaline Phosphatase  143 H  ()  U/L


 


Total Protein  5.6 L  (6.3-8.2)  g/dL


 


Albumin  3.2 L  (3.5-5.0)  g/dL


 


Triglycerides  170 H  (<150)  mg/dL








 Calcium panel











  18 Range/Units





  07:00 


 


Calcium  9.2  (8.4-10.2)  mg/dL


 


Phosphorus  5.3 H  (2.5-4.5)  mg/dL


 


Albumin  3.2 L  (3.5-5.0)  g/dL








 Pituitary panel











  18 Range/Units





  07:00 


 


Sodium  138  (137-145)  mmol/L


 


Potassium  4.5  (3.5-5.1)  mmol/L


 


Chloride  105  ()  mmol/L


 


Carbon Dioxide  18 L  (22-30)  mmol/L


 


BUN  19  (9-20)  mg/dL


 


Creatinine  1.02  (0.66-1.25)  mg/dL


 


Glucose  65 L  (74-99)  mg/dL


 


Calcium  9.2  (8.4-10.2)  mg/dL








 Adrenal panel











  18 Range/Units





  07:00 


 


Sodium  138  (137-145)  mmol/L


 


Potassium  4.5  (3.5-5.1)  mmol/L


 


Chloride  105  ()  mmol/L


 


Carbon Dioxide  18 L  (22-30)  mmol/L


 


BUN  19  (9-20)  mg/dL


 


Creatinine  1.02  (0.66-1.25)  mg/dL


 


Glucose  65 L  (74-99)  mg/dL


 


Calcium  9.2  (8.4-10.2)  mg/dL


 


Total Bilirubin  1.4 H  (0.2-1.3)  mg/dL


 


AST  28  (17-59)  U/L


 


ALT  34  (21-72)  U/L


 


Alkaline Phosphatase  143 H  ()  U/L


 


Total Protein  5.6 L  (6.3-8.2)  g/dL


 


Albumin  3.2 L  (3.5-5.0)  g/dL














Assessment and Plan


Assessment: 





Dermatitis related to bile irritation.  Would recommend barrier cream to the 

skin near the radiologic drain.  No surgical intervention is planned.

## 2018-07-27 NOTE — P.PN
Subjective


Progress Note Date: 07/27/18


Principal diagnosis: 





Acute Mental Status Changes





This a very nice patient who presented in May,2017 with abdominal pain and 

anemia,it was mild abdominal pain,off/on,he had colonoscopy on 08/16/2017 which 

revealed benign sigmoid poly.


He had a CT scan of abdomen/pelvis on 08/30/2017 which reported irregular thick 

fluid collection in upper abdomen suspicious for abscess (upon futher review,

there was mensterci adenopathies noted).


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


On 09/03/2017,he had an EGD ,biopsies taken revealed acute inflammation.


On 09/22/2017,he underwent partial gastrectomy,pathology from gastric specimen 

was benign.


He continued to have abdominal pain and anemia,had repeat CT scan of abdomen on 

11/17/2017 which revealed extensive adenopathies in the mesentery,encasing 

vasculature,measuring up to 3.9x7.3x3.7cm,which progressed compared to prior CT 

scan.


On 11/25/2017,CT scan of chest revealed non specific adenopathies.





Shortly after I saw him for the first time on 11/21/2017,I did discuss his care 

with Dr Carlton but she opted not to proceed with left inguinal node biopsy,

that weekend,he ended up in ER with worsening abdominal pain and obstruction,he 

was transferred to [facility] had biliary drain tube placement.


On 11/27/2017,he had a biopsy of abdominal mass which was positive for high 

grade B cell lymphoma,RICO negative,BCL6 positive,Non germinal center,FISH for 

MYC and BCL6 were negative but positive for BCL2.


Bone marrow biopsy done on 12/01/2017 was negative and he received first cycle 

of RCHOP at [facility] as inpatient.


Repeat CT scan on 01/26/2018 ,revealed significant improvement in his lymphoma,

persistent fluid collection.


He had total of 4 cycles of RCHOP,last one on 02/02/2018


He ended up re admitted to [facility] after cycle#4 with bowel perforation,infection,

had along hospital stay for about 6 weeks and discharged for rehab.





Rock was seen in office of Friday with increasing pain, nausea and vomiting. 

He was unable to eat because he continued to feel nauseated. He did state he 

was having bowel movements at that time. He was clinically appearing 

dehydrated. IV Hydration and antiemetics were given and abdominal flat plate 

ordered. Flat plate failed to show concern for obstruction. He was feeling 

better and discharged home from the office. 





7/9/18 - He presented to [facility] with complaints of the 

pain in his abdomen persisting and worsening. CT scan of Abdomen and Pelvis was 

compared to previous diagnostic imaging at [facility]. This may present recurrence, 

difficult to tell secondary to patients multiple surgeries and complications in 

this area. He is not a surgical candidate through [facility]. It was found he was 

quickly weaning off his chronic narcotic pain management for two weeks prior to 

this 7/9/18 admission, which may have contributed to his progressive symptoms 

of nausea and uncontrolled pain. Therefore, he was slowly weaned back up on the 

fentayl patch dose he was on 2 weeks prior and IR morphine. His pain continued 

and he continued to complain of little relief from nausea and debilitating pain 

in abdomen. His Decompression tube was draining sufficiently, Surgical consult 

followed and reviewed case, he was being followed closely as outpatient. He was 

set up for an outpatient MRI of the brain for the persistent complaints. This 

was scheduled on 7/27/18. 





However on 7/24/18 he presented to our office with acute changes in his mental 

status. c/o, persistent stomach and back pain and nausea, persistent, he was in 

ER the night before for symptoms, he received pain meds and antiemetic with ok 

results but symptoms returned.  We encouraged him to go to hospital but he 

refused so, he was hydrated and provided with antiemetic and antianxiety meds.  

At home pt behavior worsened, he was "up and down", sleeping only a few hours 

at a time, wandering and trying to get into vehicle to drive.  His wife was 

concerned for his safety and hers, as she has not slept in several days. Pt was 

scheduled for MRI yesterday, which could not be completed as pt pulled biliary 

drain out.  Assessment of mental status on 7/26/18 was the patient was lethargic

, nearly obtunded, not answering questions. 





7/27/18 -  Decompression drain positive Cultures. He had recently received a 

new picc line for the length of old was due to be changed. Infectious disease 

is following and he has been started on IV antibiotics. His mental status has 

mildly improved and more alert today, although inconsistently following 

commands. He was unable to have MRI of the brain after two attempts as he is 

too restless and clastrophobic. 





Objective





- Vital Signs


Vital signs: 


 Vital Signs











Temp  97.9 F   07/27/18 15:03


 


Pulse  91   07/27/18 15:03


 


Resp  16   07/27/18 15:03


 


BP  110/68   07/27/18 15:03


 


Pulse Ox  93 L  07/27/18 15:03








 Intake & Output











 07/26/18 07/27/18 07/27/18





 18:59 06:59 18:59


 


Intake Total 360 1170 0


 


Output Total 2950 550 200


 


Balance -2590 620 -200


 


Weight 66.678 kg  66.678 kg


 


Intake:   


 


  Intake, IV Titration 200 1170 





  Amount   


 


    Meropenem 2 gm In Sodium 200 200 





    Chloride 0.9% 100 ml @   





    200 mls/hr IVPB Q8H [facility]   





    Rx#:[id number]   


 


    Sodium Chloride 0.9% 1,  720 





    000 ml @ 60 mls/hr IV .   





    Q17H2M [facility] with Potassium   





    Chloride 20 meq with Mvi   





    , Adult No.4 with Vit K   





    10 ml with Thiamine 100   





    mg with Folic Acid 1 mg   





    Rx#:[id number]   


 


    Vancomycin 1,250 mg In  250 





    Sodium Chloride 0.9% 250   





    ml @ 125 mls/hr IVPB Q8HR   





    @0400,1200,2000 [facility] Rx#:   





    [id number]   


 


  Oral 160  0


 


Output:   


 


  Gastric Drainage 300  200


 


  Drainage 1700 550 


 


    Lower Abdomen 1700 550 


 


  Urine 950  


 


Other:   


 


  Voiding Method Urinal Bedside Commode Toilet





  Urinal 


 


  # Voids  1 


 


  # Bowel Movements  1 














- Constitutional


General appearance: Present: no acute distress, thin





- EENT


Eyes: Present: edentulous, normal appearance


ENT: Present: NA/AT, thrush





- Neck


Details: 





Supple, Trachea Midline


Neck: Present: normal ROM





- Respiratory


Respiratory: bilateral: CTA





- Cardiovascular


Rhythm: regular


Heart sounds: normal: S1, S2





- Gastrointestinal


Gastrointestinal Comment(s): 





Drain tube in place although not draining. 


General gastrointestinal: Present: soft, tenderness





- Integumentary


Integumentary Comment(s): 





Purulent drainage on bandage at decompression tube site





- Musculoskeletal


Musculoskeletal: Present: generalized weakness, strength equal bilaterally





- Psychiatric


Psychiatric Comment(s): 





Inappropriately answers questions at times, alert, still lethargic





- Labs


CBC & Chem 7: 


 07/27/18 07:00





 07/27/18 07:00


Labs: 


 Abnormal Lab Results - Last 24 Hours (Table)











  07/27/18 07/27/18 07/27/18 Range/Units





  07:00 07:00 11:34 


 


RBC  3.16 L    (4.30-5.90)  m/uL


 


Hgb  8.9 L    (13.0-17.5)  gm/dL


 


Hct  29.2 L    (39.0-53.0)  %


 


MCHC  30.5 L    (31.0-37.0)  g/dL


 


RDW  16.1 H    (11.5-15.5)  %


 


Lymphocytes #  0.3 L    (1.0-4.8)  k/uL


 


Carbon Dioxide   18 L   (22-30)  mmol/L


 


Glucose   65 L   (74-99)  mg/dL


 


POC Glucose (mg/dL)    73 L  (75-99)  mg/dL


 


Phosphorus   5.3 H   (2.5-4.5)  mg/dL


 


Total Bilirubin   1.4 H   (0.2-1.3)  mg/dL


 


Alkaline Phosphatase   143 H   ()  U/L


 


Total Protein   5.6 L   (6.3-8.2)  g/dL


 


Albumin   3.2 L   (3.5-5.0)  g/dL


 


Triglycerides   170 H   (<150)  mg/dL








 Microbiology - Last 24 Hours (Table)











 07/26/18 14:00 Catheter Tip Culture - Preliminary





 Picc Line 


 


 07/26/18 11:30 Gram Stain - Preliminary





 Abdomen Wound Culture - Preliminary





    Gram Neg Bacilli


 


 07/25/18 12:10 Blood Culture Gram Stain - Preliminary





 Blood Blood Culture - Preliminary





    Coagulase Negative Staph


 


 07/26/18 12:45 Anaerobic Culture - Preliminary





 Abdomen 














Assessment and Plan


Plan: 





Assessment and Plan


(1) Confusion


Narrative/Plan: 


Case discussed with IM.  Plan is to work up the biliary drain and treat for 

sepsis.  If no improvement in condition LP will be considered for evaluation of 

cerebral spinal fluid to rule out CNS involvement.  


Pain medications have all been discontinued other then morphine IVP to decrease 

possible narcotic induced lethargy/confusion


ID, Surgery and IR consulted 


 - Mild improvement in mental status, although still not baseline with the 

acute change


 - Unable to obtain MRI brain for agitation and claustrophobia, Recommend MRI 

of the brain to be done under anesthesia if possible. 





Current Visit: Yes   Status: Acute   Priority: High   Code(s): R41.0 - 

DISORIENTATION, UNSPECIFIED   SNOMED Code(s): 721175635


   





(2) B-cell lymphoma


Narrative/Plan: 


Pt recently had PET with mixed findings.  Plan was to follow pt and consider 

repeat scan or start treatment if pt was symptomatic.  Pt current condition has 

progressively worsened in the last 10-14 days. Cont current work up and 

treatment. Pt will be followed closely.   





Current Visit: No   Status: Chronic   Priority: High   Code(s): C85.10 - 

UNSPECIFIED B-CELL LYMPHOMA, UNSPECIFIED SITE   SNOMED Code(s): 362684243

## 2018-07-27 NOTE — PN
PROGRESS NOTE



DATE OF SERVICE:

07/27/2018.



INTERVAL HISTORY:

This 58-year-old gentleman who was admitted with change in mental status, probably had

biliary sepsis.  The patient also had acute metabolic encephalopathy with antibiotics.

Patient improved significantly.  The patient also had a drain on the biliary stump of

the Yisel-en-Y.  Patient being closely monitored.



PAST MEDICAL HISTORY:

Reviewed.



REVIEW OF SYSTEMS:

Cardiovascular:  No angina or palpitations. Respirations: As  mentioned earlier.  GI:

As mentioned earlier. :  No dysuria or hematuria. Central nervous system: No

numbness, weakness.



CURRENT MEDICATIONS ARE:

Reviewed and include:

1. Tylenol 650 q.6 p.r.n.

2. Colace 100 mg p.o. daily.

3. Vitamin D2 50,000 daily.

4. Diflucan 100 mg.

5. Heparin 5000 subcu b.i.d.

6. Motrin 400 mg p.r.n.

7. Ativan.

8. Meropenem 2 g IV q.8h.

9. Vancomycin.

10.Narcan.

11.Zofran.

12.Protonix.

13.P.r.n. medications.



PHYSICAL EXAM:

Patient is alert, oriented x2.  Pulse is 91, blood pressure, 120/60.  Respirations 16,

temperature 97.9, pulse ox 99% on room air.  HEENT: Conjunctivae normal. Oral mucosa

moist.  Neck is no jugular venous distention.  No carotid bruit. No lymph node

enlargement.  Cardiovascular systems: S1, S2 muffled.  Respirations: Breath sounds

diminished at the bases.  A few scattered rhonchi and crackles.

ABDOMEN:  Soft, nontender.  No mass palpable.  Legs:  No edema and no swelling.

NERVOUS SYSTEM: Diffusely weak.



LABS:

WBC 4.2, hemoglobin is 8.9.  Glucose 73, 109, alkaline phosphatase 143, albumin is 1.4,

triglycerides 170.  UA noted.



ASSESSMENT:

1. Change in mental status, possible sepsis or metabolic encephalopathy.  Rule out

    biliary sepsis.

2. Non-Hodgkin lymphoma on chemotherapy.

3. Decompressed drainage catheter from the biliary stump of the Yisel-en-Y.

4. Status post PICC line on the right arm, which is removed inserted in the left arm.

5. Leukopenia.

6. Anemia secondary to chemotherapy.

7. History of partial gastrectomy, lysis of adhesions, previously.

8. History of degenerative joint disease.

9. History of nicotine dependence.

10.FULL CODE.



RECOMMENDATIONS AND DISCUSSION:

Recommend to continue current medications, management and symptomatic treatment.

Otherwise we will monitor the patient closely.  Continue the IV antibiotics.  Blood

cultures showed only coag-negative Staph but wound cultures are gram-negative bacilli.

Otherwise, closely follow with infectious Disease.  Continue the rest of the

medications.  Fluid and electrolytes balance closely.  Guarded prognosis because of

multiple complex medical issues. Further recommendations to follow.





MMODL / IJN: [id number] / Job#: [id number]

## 2018-07-28 LAB
ALBUMIN SERPL-MCNC: 3.7 G/DL (ref 3.5–5)
ALP SERPL-CCNC: 145 U/L (ref 38–126)
ALT SERPL-CCNC: 35 U/L (ref 21–72)
ANION GAP SERPL CALC-SCNC: 9 MMOL/L
AST SERPL-CCNC: 31 U/L (ref 17–59)
BASOPHILS # BLD AUTO: 0 K/UL (ref 0–0.2)
BASOPHILS NFR BLD AUTO: 0 %
BUN SERPL-SCNC: 23 MG/DL (ref 9–20)
CALCIUM SPEC-MCNC: 9.3 MG/DL (ref 8.4–10.2)
CHLORIDE SERPL-SCNC: 103 MMOL/L (ref 98–107)
CO2 SERPL-SCNC: 24 MMOL/L (ref 22–30)
EOSINOPHIL # BLD AUTO: 0.1 K/UL (ref 0–0.7)
EOSINOPHIL NFR BLD AUTO: 3 %
ERYTHROCYTE [DISTWIDTH] IN BLOOD BY AUTOMATED COUNT: 3.21 M/UL (ref 4.3–5.9)
ERYTHROCYTE [DISTWIDTH] IN BLOOD: 16 % (ref 11.5–15.5)
GLUCOSE BLD-MCNC: 114 MG/DL (ref 75–99)
GLUCOSE BLD-MCNC: 117 MG/DL (ref 75–99)
GLUCOSE BLD-MCNC: 118 MG/DL (ref 75–99)
GLUCOSE BLD-MCNC: 126 MG/DL (ref 75–99)
GLUCOSE BLD-MCNC: 137 MG/DL (ref 75–99)
GLUCOSE SERPL-MCNC: 131 MG/DL (ref 74–99)
HCT VFR BLD AUTO: 29 % (ref 39–53)
HGB BLD-MCNC: 8.5 GM/DL (ref 13–17.5)
LYMPHOCYTES # SPEC AUTO: 0.2 K/UL (ref 1–4.8)
LYMPHOCYTES NFR SPEC AUTO: 7 %
MAGNESIUM SPEC-SCNC: 2 MG/DL (ref 1.6–2.3)
MCH RBC QN AUTO: 26.4 PG (ref 25–35)
MCHC RBC AUTO-ENTMCNC: 29.2 G/DL (ref 31–37)
MCV RBC AUTO: 90.4 FL (ref 80–100)
MONOCYTES # BLD AUTO: 0.3 K/UL (ref 0–1)
MONOCYTES NFR BLD AUTO: 7 %
NEUTROPHILS # BLD AUTO: 2.8 K/UL (ref 1.3–7.7)
NEUTROPHILS NFR BLD AUTO: 80 %
PLATELET # BLD AUTO: 292 K/UL (ref 150–450)
POTASSIUM SERPL-SCNC: 4.2 MMOL/L (ref 3.5–5.1)
PROT SERPL-MCNC: 6.3 G/DL (ref 6.3–8.2)
SODIUM SERPL-SCNC: 136 MMOL/L (ref 137–145)
WBC # BLD AUTO: 3.5 K/UL (ref 3.8–10.6)

## 2018-07-28 RX ADMIN — MEROPENEM SCH MLS/HR: 1 INJECTION, POWDER, FOR SOLUTION INTRAVENOUS at 10:24

## 2018-07-28 RX ADMIN — SODIUM PHOSPHATE, MONOBASIC, MONOHYDRATE SCH MLS/HR: 276; 142 INJECTION, SOLUTION INTRAVENOUS at 16:53

## 2018-07-28 RX ADMIN — MORPHINE SULFATE PRN MG: 4 INJECTION, SOLUTION INTRAMUSCULAR; INTRAVENOUS at 07:52

## 2018-07-28 RX ADMIN — CEFAZOLIN SCH: 330 INJECTION, POWDER, FOR SOLUTION INTRAMUSCULAR; INTRAVENOUS at 17:08

## 2018-07-28 RX ADMIN — MEROPENEM SCH MLS/HR: 1 INJECTION, POWDER, FOR SOLUTION INTRAVENOUS at 00:54

## 2018-07-28 RX ADMIN — ONDANSETRON PRN MG: 2 INJECTION INTRAMUSCULAR; INTRAVENOUS at 00:20

## 2018-07-28 RX ADMIN — PANTOPRAZOLE SODIUM SCH MG: 40 INJECTION, POWDER, FOR SOLUTION INTRAVENOUS at 20:18

## 2018-07-28 RX ADMIN — SODIUM CHLORIDE SCH MLS/HR: 9 INJECTION, SOLUTION INTRAVENOUS at 05:38

## 2018-07-28 RX ADMIN — SOYBEAN OIL SCH MLS/HR: 20 INJECTION, SOLUTION INTRAVENOUS at 13:45

## 2018-07-28 RX ADMIN — ONDANSETRON PRN MG: 2 INJECTION INTRAMUSCULAR; INTRAVENOUS at 15:47

## 2018-07-28 RX ADMIN — PANTOPRAZOLE SODIUM SCH MG: 40 INJECTION, POWDER, FOR SOLUTION INTRAVENOUS at 07:50

## 2018-07-28 RX ADMIN — MEROPENEM SCH MLS/HR: 1 INJECTION, POWDER, FOR SOLUTION INTRAVENOUS at 16:26

## 2018-07-28 RX ADMIN — HEPARIN SODIUM SCH UNIT: 5000 INJECTION, SOLUTION INTRAVENOUS; SUBCUTANEOUS at 20:18

## 2018-07-28 RX ADMIN — MORPHINE SULFATE PRN MG: 4 INJECTION, SOLUTION INTRAMUSCULAR; INTRAVENOUS at 22:45

## 2018-07-28 RX ADMIN — HEPARIN SODIUM SCH UNIT: 5000 INJECTION, SOLUTION INTRAVENOUS; SUBCUTANEOUS at 07:50

## 2018-07-28 RX ADMIN — DOCUSATE SODIUM SCH MG: 100 CAPSULE, LIQUID FILLED ORAL at 07:50

## 2018-07-28 RX ADMIN — FLUCONAZOLE SCH MG: 100 TABLET ORAL at 07:50

## 2018-07-28 RX ADMIN — MORPHINE SULFATE PRN MG: 4 INJECTION, SOLUTION INTRAMUSCULAR; INTRAVENOUS at 03:39

## 2018-07-28 RX ADMIN — SODIUM CHLORIDE SCH MLS/HR: 9 INJECTION, SOLUTION INTRAVENOUS at 18:09

## 2018-07-28 RX ADMIN — MORPHINE SULFATE PRN MG: 4 INJECTION, SOLUTION INTRAMUSCULAR; INTRAVENOUS at 16:42

## 2018-07-28 RX ADMIN — MORPHINE SULFATE PRN MG: 4 INJECTION, SOLUTION INTRAMUSCULAR; INTRAVENOUS at 19:21

## 2018-07-28 RX ADMIN — MORPHINE SULFATE PRN MG: 4 INJECTION, SOLUTION INTRAMUSCULAR; INTRAVENOUS at 12:29

## 2018-07-28 RX ADMIN — CEFAZOLIN SCH MLS/HR: 330 INJECTION, POWDER, FOR SOLUTION INTRAMUSCULAR; INTRAVENOUS at 16:22

## 2018-07-28 NOTE — P.CRDCN
History of Present Illness


Consult date: 18


Requesting physician: Brett Perez


Reason for Consult (text): 


chest pain





History of present illness: 


This is a pleasant 58-year-old gentleman who initially presented for confusion, 

combativeness and intractable pain.  He has a history of lymphoma as well as 

history of partial gastrectomy he underwent chemotherapy which follow-up 

computed tomography scan showed improvement but persistent fluid collection.  

He apparently developed a bowel perforation and had a prolonged hospitalization 

at [facility] he has been on TPN.  He has been complaining of 

persistent stomach and back pain, nausea.  We were asked to see the patient in 

consultation due to complaints of chest discomfort at rest.  Patient vague in 

description of discomfort.  Unable to tell if pain worsens with palpation or 

deep inspiration.  The patient denies history of hypertension, hyperlipidemia, 

CAD, or family history significant for premature CAD.  EKG showed sinus rhythm 

with no evidence of ischemia.  Cardiac enzymes have been drawn.  Upon 

examination, patient is sitting up in a chair.  He verbalizes his pain has 

improved but is not gone.  He seems upset that his pain medications have been 

decreased as he is having pain in his abdomen as well.








Past Medical History


Past Medical History: Cancer


Additional Past Medical History / Comment(s): 17 abdominal pain/anemia 

with surgical intervention, 10/2017 lymphoma diagnosed/abdominal masses at [facility] 

and pt started chemo there-1 session then 3 more sessions thru [facility]/Dr. Pardo, chemo caused severe abdominal pain and pt was diagnosed with perforated 

intestines d/t abdominal masses shrinkage/pulling on intestine-[facility] stated pt 

not surgical candidate and placed on antibiotics/long complicated 

hospitalization per spouse.


History of Any Multi-Drug Resistant Organisms: None Reported


Past Surgical History: Appendectomy, Orthopedic Surgery


Additional Past Surgical History / Comment(s): 17 laparoscopic partial 

gastrectomy with lysis of adhesions, reduction R inguinal hernia, resection 

gastroenteric fistula, gastrojujunostomy with reconstruction, EGDs/colonoscopy, 

ACL bilateral repairs, several bilateral knee arthroscopies, PICC line inserted 

17.


Past Anesthesia/Blood Transfusion Reactions: No Reported Reaction


Additional Past Anesthesia/Blood Transfusion Reaction / Comment(s): Pt has 

received blood without reaction.


Past Psychological History: No Psychological Hx Reported


Additional Psychological History / Comment(s):  lives with the wife.  

Has adult children.  Medically disabled.  No  experience.  No 

international travel.  No animals in the home.


Smoking Status: Former smoker


Past Alcohol Use History: None Reported


Additional Past Alcohol Use History / Comment(s): Pt states he was a light 

social smoker but has not smoked any cigarettes since 2017.  Pt states he has 

not had an alcoholic beverage in months, prior he drank more but never daily or 

abused.


Past Drug Use History: None Reported





- Past Family History


  ** Father


Family Medical History: Pneumonia


Additional Family Medical History / Comment(s): Father had anemia.  He  at 

the age of 79yrs from complications after a ingrown toenail was removed and 

then became infected and had to have amputation.





  ** Mother


Family Medical History: Dementia


Additional Family Medical History / Comment(s): Mother  of dementia at the 

age of 82 yrs.





Medications and Allergies


 Home Medications











 Medication  Instructions  Recorded  Confirmed  Type


 


Ergocalciferol [Vitamin D2 50,000 unit PO WE 01/15/18 07/25/18 History





(DRISDOL)]    


 


Insulin Lispro [humaLOG Kwikpen] See Protocol SQ ACHS 18 History


 


LORazepam [Ativan] 1 mg PO Q6H PRN 18 History


 


Omeprazole [PriLOSEC] 20 mg PO DAILY 18 History


 


Prochlorperazine [Compazine] 10 mg PO Q6H PRN 18 History


 


Sertraline [Zoloft] 100 mg PO DAILY 18 History


 


Morphine Sulfate Ir [MSIR] 15 mg PO Q4HR PRN #120 tablet 18 Rx


 


ALPRAZolam [Xanax] 0.25 mg PO DAILY PRN 18 History


 


Docusate [Colace] 100 mg PO DAILY 18 History


 


Dronabinol [Marinol] 2.5 mg PO AC-BID 18 History


 


Fluconazole [Diflucan] 100 mg PO DAILY 18 History


 


fentaNYL 100MCG/HR PATCH 1 patch TRANSDERM Q72H 18 History





[Duragesic 100MCG/HR]    











 Allergies











Allergy/AdvReac Type Severity Reaction Status Date / Time


 


medication for TB exposure Allergy  Unknown Uncoded 18 02:13














Physical Exam


Vitals: 


 Vital Signs











  Temp Pulse Resp BP Pulse Ox


 


 18 08:42  97.7 F  92  18  138/79  100


 


 18 08:00   92  18  


 


 18 23:00  98 F  93  16  105/66  100


 


 18 16:20   91  16  


 


 18 15:03  97.9 F  91  16  110/68  99








 Intake and Output











 18





 22:59 06:59 14:59


 


Intake Total 450 660 


 


Output Total 300  125


 


Balance 150 660 -125


 


Intake:   


 


  Intake, IV Titration 250 660 





  Amount   


 


    Fat Emulsion 20% 250 ml @  250 





    20.833 mls/hr IV DAILY@   





    1300 [facility] Rx#:[id number]   


 


    Mvi, Adult No.4 with Vit  160 





    K 10 ml Trace (Conc-1Ml/   





    Dose) 1 ml Parenteral   





    Electrolytes 20 ml In   





    Amino Acid 5%-D15w 1,000   





    ml @ 30 mls/hr IV .Q24H   





    [facility] Rx#:[id number]   


 


    Vancomycin 1,250 mg In 250 250 





    Sodium Chloride 0.9% 250   





    ml @ 125 mls/hr IVPB Q12H   





    [facility] Rx#:[id number]   


 


  Oral 200  


 


Output:   


 


  Gastric Drainage 150  


 


  Drainage 150  


 


    Lower Abdomen 150  


 


  Urine   125


 


Other:   


 


  Voiding Method Toilet Toilet Toilet


 


  # Voids 2  











PHYSICAL EXAMINATION: 





HEENT: Head is atraumatic, normocephalic.  Pupils equal, round.  Neck is 

supple.  There is no elevated jugular venous pressure.





HEART EXAMINATION: Heart sounds regular, S1 and S2 normal.  No murmur or gallop 

heard.





CHEST EXAMINATION: Lungs are clear to auscultation and precussion. No chest 

wall tenderness is noted on palpation or with deep breathing.





ABDOMEN:  Soft, nontender.  Biliary drain noted.


 


EXTREMITIES: 2+ peripheral pulses with no evidence of peripheral edema and no 

calf tenderness noted.





NEUROLOGIC patient is awake, alert and oriented x2-3.


 


.


 











Results





 18 05:28





 18 05:28


 Cardiac Enzymes











  18 Range/Units





  05:28 


 


AST  31  (17-59)  U/L








 CBC











  18 Range/Units





  05:28 


 


WBC  3.5 L  (3.8-10.6)  k/uL


 


RBC  3.21 L  (4.30-5.90)  m/uL


 


Hgb  8.5 L  (13.0-17.5)  gm/dL


 


Hct  29.0 L  (39.0-53.0)  %


 


Plt Count  292  (150-450)  k/uL








 Comprehensive Metabolic Panel











  18 Range/Units





  05:28 


 


Sodium  136 L  (137-145)  mmol/L


 


Potassium  4.2  (3.5-5.1)  mmol/L


 


Chloride  103  ()  mmol/L


 


Carbon Dioxide  24  (22-30)  mmol/L


 


BUN  23 H  (9-20)  mg/dL


 


Creatinine  0.80  (0.66-1.25)  mg/dL


 


Glucose  131 H  (74-99)  mg/dL


 


Calcium  9.3  (8.4-10.2)  mg/dL


 


AST  31  (17-59)  U/L


 


ALT  35  (21-72)  U/L


 


Alkaline Phosphatase  145 H  ()  U/L


 


Total Protein  6.3  (6.3-8.2)  g/dL


 


Albumin  3.7  (3.5-5.0)  g/dL








 Current Medications











Generic Name Dose Route Start Last Admin





  Trade Name Freq  PRN Reason Stop Dose Admin


 


Acetaminophen  650 mg  18 15:56  





  Tylenol Tab  PO   





  Q6HR PRN   





  Mild Pain or Fever > 100.5   





     





     





     


 


Docusate Sodium  100 mg  18 09:00  18 07:50





  Colace  PO   100 mg





  DAILY [facility]   Administration





     





     





     





     


 


Ergocalciferol  50,000 unit  18 17:45  18 05:38





  Vitamin D2  PO   Not Given





  [facility]   





     





     





     





     


 


Fluconazole  100 mg  18 09:00  18 07:50





  Diflucan  PO   100 mg





  DAILY [facility]   Administration





     





     





     





     


 


Heparin Sodium (Porcine)  5,000 unit  18 21:00  18 07:50





  Heparin  SQ   5,000 unit





  Q12HR [facility]   Administration





     





     





     





     


 


Meropenem 2 gm/ Sodium  100 mls @ 200 mls/hr  18 01:00  18 10:24





  Chloride  IVPB   200 mls/hr





  Q8H [facility]   Administration





     





     





  Protocol   





     


 


Potassium Chloride 20 meq/  1,021.2 mls @ 60 mls/hr  18 13:00  18 11

:52





Parenteral Vitamin Supplement  IV   60 mls/hr





10 ml/ Thiamine HCl 100 mg/  .Q17H2M [facility]   Administration





Folic Acid 1 mg/ Sodium     





Chloride     





     





     


 


Vancomycin HCl 1,250 mg/  250 mls @ 125 mls/hr  18 18:00  18 05:38





  Sodium Chloride  IVPB   125 mls/hr





  Q12H [facility]   Administration





     





     





     





     


 


Parenteral Vitamin Supplement  1,031 mls @ 95 mls/hr  18 13:00  18 

13:44





10 ml/ Chromium/Copper/  IV   95 mls/hr





Manganese/Seleni/Zn 1 ml/  .BY DURATION [facility]   Administration





Parenteral Electrolytes 20 ml/     





Amino Acids/Dextrose     





     





     


 


Parenteral Electrolytes 20 ml/  1,020 mls @ 95 mls/hr  18 13:00  





  Amino Acids/Dextrose  IV   





  .BY DURATION [facility]   





     





     





     





     


 


Fat Emulsion Intravenous  250 mls @ 20.833 mls/hr  18 13:00  18 13:

45





  Lipids 20%  IV   20.833 mls/hr





  DAILY@1300 [facility]   Administration





     





     





     





     


 


Ketorolac Tromethamine  30 mg  18 15:56  18 05:39





  Toradol  IVP  18 15:57  30 mg





  Q6HR PRN   Administration





  Moderate Pain   





     





     





     


 


Lorazepam  0.5 mg  18 15:56  18 06:58





  Ativan  IV   0.5 mg





  Q6HR PRN   Administration





  Anxiety   





     





     





     


 


Morphine Sulfate  4 mg  18 09:19  18 12:29





  Morphine Sulfate (Inj)  IV   4 mg





  Q3HR PRN   Administration





  Severe Pain   





     





     





     


 


Naloxone HCl  0.2 mg  18 15:56  





  Narcan  IV   





  Q2M PRN   





  Opioid Reversal   





     





     





     


 


Ondansetron HCl  4 mg  18 15:56  18 00:20





  Zofran  IVP   4 mg





  Q8HR PRN   Administration





  Nausea And Vomiting   





     





     





     


 


Pantoprazole Sodium  40 mg  18 21:00  18 07:50





  Protonix  IV   40 mg





  BID [facility]   Administration





     





     





     





     








 Intake and Output











 18





 22:59 06:59 14:59


 


Intake Total 450 660 


 


Output Total 300  125


 


Balance 150 660 -125


 


Intake:   


 


  Intake, IV Titration 250 660 





  Amount   


 


    Fat Emulsion 20% 250 ml @  250 





    20.833 mls/hr IV DAILY@   





    1300 [facility] Rx#:[id number]   


 


    Mvi, Adult No.4 with Vit  160 





    K 10 ml Trace (Conc-1Ml/   





    Dose) 1 ml Parenteral   





    Electrolytes 20 ml In   





    Amino Acid 5%-D15w 1,000   





    ml @ 30 mls/hr IV .Q24H   





    [facility] Rx#:[id number]   


 


    Vancomycin 1,250 mg In 250 250 





    Sodium Chloride 0.9% 250   





    ml @ 125 mls/hr IVPB Q12H   





    [facility] Rx#:[id number]   


 


  Oral 200  


 


Output:   


 


  Gastric Drainage 150  


 


  Drainage 150  


 


    Lower Abdomen 150  


 


  Urine   125


 


Other:   


 


  Voiding Method Toilet Toilet Toilet


 


  # Voids 2  








 





 18 05:28 





 18 05:28 











Assessment and Plan


Assessment: 


#1 altered mental status


#2 abdominal pain


#3 B-cell lymphoma


#4 gram-positive cocci bacteremia


#5 symptoms of chest pain, seems atypical





Plan: 


From cardiology perspective, we will obtain a 2-D echo with Doppler.  Further 

recommendations to follow.





NP note has been reviewed, I agree with a documented findings and plan of care.

  Patient was seen and examined.

## 2018-07-28 NOTE — P.PN
Subjective


Progress Note Date: 07/28/18





  The patient is doing better overall since yesterday.  He has been able to 

ambulate.  Mentally he is much clearer and appears to be now oriented 3 with 

appropriate responses.  He still complaining of pain, stating that his morphine 

affect is not lasting long enough.  He wants to know if he can start by mouth.  

He denies any nausea or vomiting.  No obvious bleeding noted.





Objective





- Vital Signs


Vital signs: 


 Vital Signs











Temp  97.7 F   07/28/18 08:42


 


Pulse  92   07/28/18 08:42


 


Resp  18   07/28/18 08:42


 


BP  138/79   07/28/18 08:42


 


Pulse Ox  100   07/28/18 08:42








 Intake & Output











 07/27/18 07/28/18 07/28/18





 18:59 06:59 18:59


 


Intake Total 0 1110 


 


Output Total 350 150 125


 


Balance -350 960 -125


 


Weight 66.678 kg  


 


Intake:   


 


  Intake, IV Titration  910 





  Amount   


 


    Fat Emulsion 20% 250 ml @  250 





    20.833 mls/hr IV DAILY@   





    [facility] Rx#:[id number]   


 


    Mvi, Adult No.4 with Vit  160 





    K 10 ml Trace (Conc-1Ml/   





    Dose) 1 ml Parenteral   





    Electrolytes 20 ml In   





    Amino Acid 5%-D15w 1,000   





    ml @ 30 mls/hr IV .Q24H   





    [facility] Rx#:[id number]   


 


    Vancomycin 1,250 mg In  500 





    Sodium Chloride 0.9% 250   





    ml @ 125 mls/hr IVPB Q12H   





    [facility] Rx#:[id number]   


 


  Oral 0 200 


 


Output:   


 


  Gastric Drainage 350  


 


  Drainage  150 


 


    Lower Abdomen  150 


 


  Urine   125


 


Other:   


 


  Voiding Method Toilet Toilet 


 


  # Voids  2 














- Constitutional


General appearance: Present: no acute distress





- EENT


Eyes: Present: EOMI


ENT: Present: hearing grossly normal, normal oropharynx





- Respiratory


Respiratory: bilateral: CTA





- Cardiovascular


Rhythm: regular


Heart sounds: normal: S1, S2





- Gastrointestinal


Gastrointestinal Comment(s): 





tube insertion site clean


General gastrointestinal: Present: decreased bowel sounds, soft





- Integumentary


Integumentary: Present: normal





- Neurologic


Neurologic: Present: CNII-XII intact





- Musculoskeletal


Musculoskeletal: Present: generalized weakness, strength equal bilaterally





- Psychiatric


Psychiatric: Present: A&O x's 3, appropriate affect





- Labs


CBC & Chem 7: 


 07/28/18 05:28





 07/28/18 05:28


Labs: 


 Abnormal Lab Results - Last 24 Hours (Table)











  07/27/18 07/27/18 07/27/18 Range/Units





  07:00 11:34 17:12 


 


WBC     (3.8-10.6)  k/uL


 


RBC     (4.30-5.90)  m/uL


 


Hgb     (13.0-17.5)  gm/dL


 


Hct     (39.0-53.0)  %


 


MCHC     (31.0-37.0)  g/dL


 


RDW     (11.5-15.5)  %


 


Lymphocytes #     (1.0-4.8)  k/uL


 


Sodium     (137-145)  mmol/L


 


BUN     (9-20)  mg/dL


 


Glucose     (74-99)  mg/dL


 


POC Glucose (mg/dL)   73 L  109 H  (75-99)  mg/dL


 


Phosphorus  5.3 H    (2.5-4.5)  mg/dL


 


Alkaline Phosphatase     ()  U/L


 


Triglycerides  170 H    (<150)  mg/dL














  07/27/18 07/28/18 07/28/18 Range/Units





  19:49 00:20 05:28 


 


WBC    3.5 L  (3.8-10.6)  k/uL


 


RBC    3.21 L  (4.30-5.90)  m/uL


 


Hgb    8.5 L  (13.0-17.5)  gm/dL


 


Hct    29.0 L  (39.0-53.0)  %


 


MCHC    29.2 L  (31.0-37.0)  g/dL


 


RDW    16.0 H  (11.5-15.5)  %


 


Lymphocytes #    0.2 L  (1.0-4.8)  k/uL


 


Sodium     (137-145)  mmol/L


 


BUN     (9-20)  mg/dL


 


Glucose     (74-99)  mg/dL


 


POC Glucose (mg/dL)  102 H  114 H   (75-99)  mg/dL


 


Phosphorus     (2.5-4.5)  mg/dL


 


Alkaline Phosphatase     ()  U/L


 


Triglycerides     (<150)  mg/dL














  07/28/18 07/28/18 Range/Units





  05:28 06:31 


 


WBC    (3.8-10.6)  k/uL


 


RBC    (4.30-5.90)  m/uL


 


Hgb    (13.0-17.5)  gm/dL


 


Hct    (39.0-53.0)  %


 


MCHC    (31.0-37.0)  g/dL


 


RDW    (11.5-15.5)  %


 


Lymphocytes #    (1.0-4.8)  k/uL


 


Sodium  136 L   (137-145)  mmol/L


 


BUN  23 H   (9-20)  mg/dL


 


Glucose  131 H   (74-99)  mg/dL


 


POC Glucose (mg/dL)   126 H  (75-99)  mg/dL


 


Phosphorus    (2.5-4.5)  mg/dL


 


Alkaline Phosphatase  145 H   ()  U/L


 


Triglycerides    (<150)  mg/dL








 Microbiology - Last 24 Hours (Table)











 07/25/18 12:10 Blood Culture Gram Stain - Final





 Blood Blood Culture - Final





    Coagulase Negative Staph


 


 07/26/18 14:00 Catheter Tip Culture - Preliminary





 Picc Line 


 


 07/26/18 11:30 Gram Stain - Preliminary





 Abdomen Wound Culture - Preliminary





    Gram Neg Bacilli














Assessment and Plan


(1) Altered mental status


Narrative/Plan: 


   The etiology of that is not clear.  There are multiple possible factors, 

including medication effect and possible infection.  There was also concern for 

CNS involvement with his malignancy.  The patient has been started on 

antibiotics.  He was unable to have his MRI, despite sedative medications.  

However since yesterday evening, and today, the patient though somewhat 

lethargic, is awake, oriented 3, and appropriate in his responses.


   Based on the above, at this time medication effect appears more likely.  

Continue to monitor.  If symptoms recur, we will reattempt brain imaging, and 

also consider LP.


Current Visit: Yes   Status: Acute   Code(s): R41.82 - ALTERED MENTAL STATUS, 

UNSPECIFIED   SNOMED Code(s): 859363385


   





(2) Abdominal pain


Narrative/Plan: 


  This is felt to be related to his malignancy, and possibly prior surgeries.  

The pain has been difficult to manage, with the patient developing significant 

side effects especially mental status changes, with increase in medications, 

and complaining of poor pain control with decreased.  At this time he is on 

morphine alone to try to estimate total pain medication requirement, prior to 

starting a long-acting formulation.  He is complaining that the medication 

effect is not lasting long enough.  Case was discussed extensively with the 

patient, and his wife who is at the bedside.. They expressed understanding of 

the challenging situation, and the fact that pain medications will need to be 

adjusted very cautiously and incrementally to prevent side effects while trying 

to achieve reasonable pain control.  For now I will change the dosing interval 

of morphine to every 3 hours.  Consult will also be placed to the pain service 

for consideration of a celiac plexus block.  Given the location of his tumor 

and pain, this has a potential to be helpful


Current Visit: No   Status: Acute   Code(s): R10.9 - UNSPECIFIED ABDOMINAL PAIN

   SNOMED Code(s): 10648560

## 2018-07-28 NOTE — P.PN
Subjective


Progress Note Date: 07/28/18


Principal diagnosis: 





Abdominal pain





Patient doing better today.  Was having pain around his bili is drain site.  He 

is thirsty.  Seems to be responding well to antibiotic therapy for suspected 

PICC line infection.





Objective





- Vital Signs


Vital signs: 


 Vital Signs











Temp  97.7 F   07/28/18 08:42


 


Pulse  92   07/28/18 08:42


 


Resp  18   07/28/18 08:42


 


BP  138/79   07/28/18 08:42


 


Pulse Ox  100   07/28/18 08:42








 Intake & Output











 07/27/18 07/28/18 07/28/18





 18:59 06:59 18:59


 


Intake Total 0 1110 


 


Output Total 350 150 125


 


Balance -350 960 -125


 


Weight 66.678 kg  


 


Intake:   


 


  Intake, IV Titration  910 





  Amount   


 


    Fat Emulsion 20% 250 ml @  250 





    20.833 mls/hr IV DAILY@   





    [facility] Rx#:[id number]   


 


    Mvi, Adult No.4 with Vit  160 





    K 10 ml Trace (Conc-1Ml/   





    Dose) 1 ml Parenteral   





    Electrolytes 20 ml In   





    Amino Acid 5%-D15w 1,000   





    ml @ 30 mls/hr IV .Q24H   





    [facility] Rx#:[id number]   


 


    Vancomycin 1,250 mg In  500 





    Sodium Chloride 0.9% 250   





    ml @ 125 mls/hr IVPB Q12H   





    [facility] Rx#:[id number]   


 


  Oral 0 200 


 


Output:   


 


  Gastric Drainage 350  


 


  Drainage  150 


 


    Lower Abdomen  150 


 


  Urine   125


 


Other:   


 


  Voiding Method Toilet Toilet 


 


  # Voids  2 














- Exam





Abdomen: Soft, nondistended, mild tenderness upper abdomen, drain noted with 

minimal skin irritation





- Labs


CBC & Chem 7: 


 07/28/18 05:28





 07/28/18 05:28


Labs: 


 Abnormal Lab Results - Last 24 Hours (Table)











  07/27/18 07/27/18 07/27/18 Range/Units





  11:34 17:12 19:49 


 


WBC     (3.8-10.6)  k/uL


 


RBC     (4.30-5.90)  m/uL


 


Hgb     (13.0-17.5)  gm/dL


 


Hct     (39.0-53.0)  %


 


MCHC     (31.0-37.0)  g/dL


 


RDW     (11.5-15.5)  %


 


Lymphocytes #     (1.0-4.8)  k/uL


 


Sodium     (137-145)  mmol/L


 


BUN     (9-20)  mg/dL


 


Glucose     (74-99)  mg/dL


 


POC Glucose (mg/dL)  73 L  109 H  102 H  (75-99)  mg/dL


 


Alkaline Phosphatase     ()  U/L














  07/28/18 07/28/18 07/28/18 Range/Units





  00:20 05:28 05:28 


 


WBC   3.5 L   (3.8-10.6)  k/uL


 


RBC   3.21 L   (4.30-5.90)  m/uL


 


Hgb   8.5 L   (13.0-17.5)  gm/dL


 


Hct   29.0 L   (39.0-53.0)  %


 


MCHC   29.2 L   (31.0-37.0)  g/dL


 


RDW   16.0 H   (11.5-15.5)  %


 


Lymphocytes #   0.2 L   (1.0-4.8)  k/uL


 


Sodium    136 L  (137-145)  mmol/L


 


BUN    23 H  (9-20)  mg/dL


 


Glucose    131 H  (74-99)  mg/dL


 


POC Glucose (mg/dL)  114 H    (75-99)  mg/dL


 


Alkaline Phosphatase    145 H  ()  U/L














  07/28/18 Range/Units





  06:31 


 


WBC   (3.8-10.6)  k/uL


 


RBC   (4.30-5.90)  m/uL


 


Hgb   (13.0-17.5)  gm/dL


 


Hct   (39.0-53.0)  %


 


MCHC   (31.0-37.0)  g/dL


 


RDW   (11.5-15.5)  %


 


Lymphocytes #   (1.0-4.8)  k/uL


 


Sodium   (137-145)  mmol/L


 


BUN   (9-20)  mg/dL


 


Glucose   (74-99)  mg/dL


 


POC Glucose (mg/dL)  126 H  (75-99)  mg/dL


 


Alkaline Phosphatase   ()  U/L








 Microbiology - Last 24 Hours (Table)











 07/25/18 12:10 Blood Culture Gram Stain - Final





 Blood Blood Culture - Final





    Coagulase Negative Staph


 


 07/26/18 14:00 Catheter Tip Culture - Preliminary





 Picc Line 


 


 07/26/18 11:30 Gram Stain - Preliminary





 Abdomen Wound Culture - Preliminary





    Gram Neg Bacilli














Assessment and Plan


(1) Abdominal pain


Narrative/Plan: 


Continue antibiotics per infectious disease.  Advance diet to liquids.  We'll 

sign off.  Please contact if needed.


Current Visit: No   Status: Acute   Code(s): R10.9 - UNSPECIFIED ABDOMINAL PAIN

   OMED Code(s): 47761375

## 2018-07-28 NOTE — PN
PROGRESS NOTE



DATE OF SERVICE:

07/28/2018.



PRESENTING COMPLAINT:

Abdominal pain.



INTERVAL HISTORY:

This is a patient with advanced abdominal lymphoma, recently discharged from the

hospital, presented with worsening abdominal pain.  Pain is still somewhat

uncontrolled.  Because patient had become rather encephalopathic, pain medication had

been cut back.  Today patient is more awake, asking for more pain medications, has a

biliary drain in place.  The patient has been tolerating some diet.  Some nausea is

present.



REVIEW OF SYSTEM:

Done for constitutional, cardiovascular, GI, pulmonary; relevant findings as above.



CURRENT MEDICATIONS:

Reviewed that include:

1. IV meropenem.

2. Vancomycin.

3. TPN lipids.



EXAMINATION:

Temperature 98.2, pulse 99, respirations 16, blood pressure 112/76, pulse ox 99% on

room air.

GENERAL APPEARANCE:  Lying in bed, tired-appearing, awake.

EYES:  Pupil equal.  Conjunctiva pale.

HEENT:  External appearance of nose and ears normal.  Oral cavity dry.

NECK:  JVD not raised.  Mass not palpable.

RESPIRATORY:  Effort normal.

LUNGS:  Diminished breath sounds.

CARDIOVASCULAR:  First and second sounds normal.  No edema.

ABDOMEN:  Soft.  Biliary drain in place.  Tenderness present.  No guarding or rigidity.

Liver and spleen not palpable.

PSYCHIATRY:  Alert and oriented x3.  Mood and affect slightly anxious-appearing.



INVESTIGATIONS:

Accu-Cheks are noted.  Troponin x2 negative.  White count 3.5, hemoglobin 8.5.

Potassium 4.2.



ASSESSMENT:

1. Non-Hodgkin lymphoma, stage IV, status post 4 cycles of R-CHOP causing increasing

    abdominal pain, persistent.

2. Acute on chronic abdominal pain from above, not controlled on the current regime.

3. Normocytic anemia from underlying lymphoma.

4. Mild protein-calorie malnutrition.

5. Various levels of encephalopathy from pain medications.



PLAN:

Earlier Dr. Robison had increased the patient's pain medications and has consulted the pain

management team for possible plexus block.  Overall prognosis is guarded.  The

patient's daughter was at the bedside.  I did talk to her.  No surgical intervention at

this point.  Prognosis remains guarded.  The patient does have a PICC line in the left

arm.  The patient's blood cultures did show coagulase-negative Staph, felt to be a

contaminant.  Antibiotics to continue per Dr. Louise.





MMODL / IJN: [id number] / Job#: [id number]

## 2018-07-29 LAB
ALBUMIN SERPL-MCNC: 3.3 G/DL (ref 3.5–5)
ALP SERPL-CCNC: 120 U/L (ref 38–126)
ALT SERPL-CCNC: 35 U/L (ref 21–72)
ANION GAP SERPL CALC-SCNC: 7 MMOL/L
AST SERPL-CCNC: 28 U/L (ref 17–59)
BASOPHILS # BLD AUTO: 0 K/UL (ref 0–0.2)
BASOPHILS NFR BLD AUTO: 0 %
BUN SERPL-SCNC: 21 MG/DL (ref 9–20)
CALCIUM SPEC-MCNC: 8.8 MG/DL (ref 8.4–10.2)
CHLORIDE SERPL-SCNC: 103 MMOL/L (ref 98–107)
CO2 SERPL-SCNC: 25 MMOL/L (ref 22–30)
EOSINOPHIL # BLD AUTO: 0.1 K/UL (ref 0–0.7)
EOSINOPHIL NFR BLD AUTO: 5 %
ERYTHROCYTE [DISTWIDTH] IN BLOOD BY AUTOMATED COUNT: 2.81 M/UL (ref 4.3–5.9)
ERYTHROCYTE [DISTWIDTH] IN BLOOD: 16 % (ref 11.5–15.5)
GLUCOSE BLD-MCNC: 116 MG/DL (ref 75–99)
GLUCOSE BLD-MCNC: 117 MG/DL (ref 75–99)
GLUCOSE BLD-MCNC: 120 MG/DL (ref 75–99)
GLUCOSE BLD-MCNC: 130 MG/DL (ref 75–99)
GLUCOSE BLD-MCNC: 138 MG/DL (ref 75–99)
GLUCOSE SERPL-MCNC: 130 MG/DL (ref 74–99)
HCT VFR BLD AUTO: 24.9 % (ref 39–53)
HGB BLD-MCNC: 7.7 GM/DL (ref 13–17.5)
LYMPHOCYTES # SPEC AUTO: 0.2 K/UL (ref 1–4.8)
LYMPHOCYTES NFR SPEC AUTO: 9 %
MAGNESIUM SPEC-SCNC: 1.9 MG/DL (ref 1.6–2.3)
MCH RBC QN AUTO: 27.4 PG (ref 25–35)
MCHC RBC AUTO-ENTMCNC: 30.9 G/DL (ref 31–37)
MCV RBC AUTO: 88.7 FL (ref 80–100)
MONOCYTES # BLD AUTO: 0.2 K/UL (ref 0–1)
MONOCYTES NFR BLD AUTO: 9 %
NEUTROPHILS # BLD AUTO: 1.6 K/UL (ref 1.3–7.7)
NEUTROPHILS NFR BLD AUTO: 74 %
PLATELET # BLD AUTO: 242 K/UL (ref 150–450)
POTASSIUM SERPL-SCNC: 3.9 MMOL/L (ref 3.5–5.1)
PROT SERPL-MCNC: 5.5 G/DL (ref 6.3–8.2)
SODIUM SERPL-SCNC: 135 MMOL/L (ref 137–145)
WBC # BLD AUTO: 2.2 K/UL (ref 3.8–10.6)

## 2018-07-29 RX ADMIN — HEPARIN SODIUM SCH UNIT: 5000 INJECTION, SOLUTION INTRAVENOUS; SUBCUTANEOUS at 21:46

## 2018-07-29 RX ADMIN — HEPARIN SODIUM SCH UNIT: 5000 INJECTION, SOLUTION INTRAVENOUS; SUBCUTANEOUS at 08:34

## 2018-07-29 RX ADMIN — MORPHINE SULFATE PRN MG: 4 INJECTION, SOLUTION INTRAMUSCULAR; INTRAVENOUS at 07:33

## 2018-07-29 RX ADMIN — MORPHINE SULFATE PRN MG: 4 INJECTION, SOLUTION INTRAMUSCULAR; INTRAVENOUS at 15:21

## 2018-07-29 RX ADMIN — MAGNESIUM SULFATE IN DEXTROSE SCH MLS/HR: 10 INJECTION, SOLUTION INTRAVENOUS at 15:24

## 2018-07-29 RX ADMIN — MORPHINE SULFATE PRN MG: 4 INJECTION, SOLUTION INTRAMUSCULAR; INTRAVENOUS at 11:20

## 2018-07-29 RX ADMIN — ONDANSETRON PRN MG: 2 INJECTION INTRAMUSCULAR; INTRAVENOUS at 08:31

## 2018-07-29 RX ADMIN — MEROPENEM SCH MLS/HR: 1 INJECTION, POWDER, FOR SOLUTION INTRAVENOUS at 09:54

## 2018-07-29 RX ADMIN — SODIUM PHOSPHATE, MONOBASIC, MONOHYDRATE SCH MLS/HR: 276; 142 INJECTION, SOLUTION INTRAVENOUS at 04:16

## 2018-07-29 RX ADMIN — MEROPENEM SCH MLS/HR: 1 INJECTION, POWDER, FOR SOLUTION INTRAVENOUS at 00:38

## 2018-07-29 RX ADMIN — MORPHINE SULFATE PRN MG: 4 INJECTION, SOLUTION INTRAMUSCULAR; INTRAVENOUS at 01:38

## 2018-07-29 RX ADMIN — MORPHINE SULFATE PRN MG: 4 INJECTION, SOLUTION INTRAMUSCULAR; INTRAVENOUS at 04:17

## 2018-07-29 RX ADMIN — SOYBEAN OIL SCH MLS/HR: 20 INJECTION, SOLUTION INTRAVENOUS at 15:50

## 2018-07-29 RX ADMIN — SODIUM CHLORIDE SCH MLS/HR: 9 INJECTION, SOLUTION INTRAVENOUS at 06:10

## 2018-07-29 RX ADMIN — FLUCONAZOLE SCH MG: 100 TABLET ORAL at 08:34

## 2018-07-29 RX ADMIN — ONDANSETRON PRN MG: 2 INJECTION INTRAMUSCULAR; INTRAVENOUS at 00:37

## 2018-07-29 RX ADMIN — MORPHINE SULFATE PRN MG: 4 INJECTION, SOLUTION INTRAMUSCULAR; INTRAVENOUS at 19:28

## 2018-07-29 RX ADMIN — SODIUM CHLORIDE SCH MLS/HR: 9 INJECTION, SOLUTION INTRAVENOUS at 20:05

## 2018-07-29 RX ADMIN — SODIUM PHOSPHATE, MONOBASIC, MONOHYDRATE SCH MLS/HR: 276; 142 INJECTION, SOLUTION INTRAVENOUS at 04:27

## 2018-07-29 RX ADMIN — MEROPENEM SCH MLS/HR: 1 INJECTION, POWDER, FOR SOLUTION INTRAVENOUS at 19:06

## 2018-07-29 RX ADMIN — MAGNESIUM SULFATE IN DEXTROSE SCH MLS/HR: 10 INJECTION, SOLUTION INTRAVENOUS at 13:59

## 2018-07-29 RX ADMIN — DOCUSATE SODIUM SCH MG: 100 CAPSULE, LIQUID FILLED ORAL at 08:34

## 2018-07-29 RX ADMIN — MORPHINE SULFATE PRN MG: 4 INJECTION, SOLUTION INTRAMUSCULAR; INTRAVENOUS at 22:40

## 2018-07-29 RX ADMIN — PANTOPRAZOLE SODIUM SCH MG: 40 INJECTION, POWDER, FOR SOLUTION INTRAVENOUS at 21:45

## 2018-07-29 RX ADMIN — PANTOPRAZOLE SODIUM SCH MG: 40 INJECTION, POWDER, FOR SOLUTION INTRAVENOUS at 08:34

## 2018-07-29 NOTE — PN
PROGRESS NOTE



DATE OF SERVICE:

July 29, 2018.



PRESENTING COMPLAINT:

Abdominal pain.



INTERVAL HISTORY:

This is a patient with advanced abdominal lymphoma.  Has an abdominal drain in place.

Continues to have chronic abdominal pain.  In and out of encephalopathy when pain

medications increased.  Consultation to Pain Management has been done for possible

celiac plexus block.  The patient does tolerate some diet.  Wife is present.  The

patient is sitting up on a chair.  Some nausea is present.  The patient is on a full

liquid diet.



REVIEW OF SYSTEMS:

Done for constitutional, cardiovascular, GI, pulmonary; relevant findings as above.



CURRENT MEDICATIONS:

Reviewed that include IV meropenem, TPN, lipids and vancomycin.



PHYSICAL EXAMINATION:

VITAL SIGNS: Temperature 97.3, pulse 104, respiratory 15, blood pressure 123/85, pulse

ox 100% on room air.

GENERAL APPEARANCE:  Sitting up on a chair somewhat uncomfortable.

EYES:  Pupils equal. Conjunctivae pale.

HEENT:  External appearance of nose and ears normal.  Oral cavity normal.

NECK:  JVD not raised.  Mass not palpable.

RESPIRATORY:  Effort lungs decreased breath sounds. CARDIOVASCULAR:  1st and 2nd sounds

normal.  No edema. ABDOMEN:  Tender.  Biliary drain in place.  No guarding or rigidity.

Liver and spleen not palpable.

PSYCHIATRY:  Alert and oriented x3.  Mood and affect anxious-appearing.



INVESTIGATIONS:

White count 2.2, hemoglobin 7.7, potassium 3.9, BUN 21, creatinine 0.70.



ASSESSMENT:

1. Non-Hodgkin's lymphoma, stage IV, advanced, status post 4 cycles of R-CHOP with

    increasing abdominal pain, persistent.

2. Acute on chronic abdominal pain from above, not controlled on the current regime.

    The problem is every time increases pain medication, he becomes encephalopathic

    pending pain management input.

3. Normocytic anemia from underlying lymphoma.

4. Mild protein-calorie malnutrition.

5. Metabolic encephalopathy from pain medications, fluctuating.

6. The patient's initial PICC line was removed and a new PICC line was placed.

7. Anterior chest wall pain for which Cardiology saw the patient, felt to be

    noncardiac at this point, pending 2D echocardiogram.



PLAN:

Talked to the wife.  Continue current medication and treatment plan.





MMODL / IJN: [id number] / Job#: [id number]

## 2018-07-29 NOTE — P.PN
Subjective





This is a pleasant 58-year-old gentleman who initially presented for confusion, 

combativeness and intractable pain.  He has a history of lymphoma as well as 

history of partial gastrectomy he underwent chemotherapy which follow-up 

computed tomography scan showed improvement but persistent fluid collection.  

He apparently developed a bowel perforation and had a prolonged hospitalization 

at [facility] he has been on TPN.  He has been complaining of 

persistent stomach and back pain, nausea.  We were asked to see the patient in 

consultation due to complaints of chest discomfort at rest.  Patient vague in 

description of discomfort.  Unable to tell if pain worsens with palpation or 

deep inspiration.  The patient denies history of hypertension, hyperlipidemia, 

CAD, or family history significant for premature CAD.  EKG showed sinus rhythm 

with no evidence of ischemia.  Cardiac enzymes have been drawn.  Upon 

examination, patient is sitting up in a chair.  He verbalizes his pain has 

improved but is not gone.  He seems upset that his pain medications have been 

decreased as he is having pain in his abdomen as well.





7/29/2018


Patient is seen and examined resting comfortably in bed in no acute distress.  

He continues to complain of ongoing intractable pain of his entire thoracic 

region.  He is much more alert today.  An acute coronary event has been ruled 

out.  Sodium 135, potassium 3.9, creatinine 0.7, cardiac enzymes negative 2, 

hemoglobin 7.7 platelets 242.  Blood pressure 125/77 heart rate 108 afebrile 

maintaining oxygen saturation on room air.





Objective





- Vital Signs


Vital signs: 


 Vital Signs











Temp  98.6 F   07/29/18 05:35


 


Pulse  108 H  07/29/18 08:35


 


Resp  18   07/29/18 08:35


 


BP  125/77   07/29/18 05:35


 


Pulse Ox  94 L  07/29/18 05:35








 Intake & Output











 07/28/18 07/29/18 07/29/18





 18:59 06:59 18:59


 


Intake Total 1410 3600 


 


Output Total 250 1700 100


 


Balance 1160 1900 -100


 


Weight  65.678 kg 


 


Intake:   


 


  Intake, IV Titration 930 2860 





  Amount   


 


    Fat Emulsion 20% 250 ml @ 200 240 





    20.833 mls/hr IV DAILY@   





    [facility] Rx#:[id number]   


 


    Meropenem 2 gm In Sodium 100 200 





    Chloride 0.9% 100 ml @   





    200 mls/hr IVPB Q8H [facility]   





    Rx#:[id number]   


 


    Mvi, Adult No.4 with Vit  1150 





    K 10 ml Trace (Conc-1Ml/   





    Dose) 1 ml Parenteral   





    Electrolytes 20 ml In   





    Amino Acid 5%-D15w 1,000   





    ml @ 95 mls/hr IV .BY   





    DUR[facility] Rx#:   





    [id number]   


 


    Parenteral Electrolytes 380 1020 





    20 ml In Amino Acid 5%-   





    D15w 1,000 ml @ 95 mls/hr   





    IV .BY DURATION [facility] Rx#:   





    [id number]   


 


    Vancomycin 1,250 mg In 250 250 





    Sodium Chloride 0.9% 250   





    ml @ 125 mls/hr IVPB Q12H   





    [facility] Rx#:[id number]   


 


  Oral 480 740 


 


Output:   


 


  Gastric Drainage   100


 


  Drainage  350 


 


    Lower Abdomen  350 


 


  Urine 250 1350 


 


Other:   


 


  Voiding Method Toilet Toilet Toilet


 


  # Voids 3 1 














- Exam





NECK: Supple without JVD or thyromegaly.


LUNGS: Breath sounds clear to auscultation bilaterally. Respiration equal and 

unlabored.  No wheezes, rales or rhonchi.  Diminished bilaterally.


HEART: Regular rate and rhythm without murmurs, rubs or gallops. S1 and S2 

heard.


EXTREMITIES: Normal range of motion, no edema.  No clubbing or cyanosis. 

Peripheral pulses intact.





- Labs


CBC & Chem 7: 


 07/29/18 02:00





 07/29/18 02:00


Labs: 


 Abnormal Lab Results - Last 24 Hours (Table)











  07/28/18 07/28/18 07/29/18 Range/Units





  17:10 23:58 02:00 


 


WBC    2.2 L  (3.8-10.6)  k/uL


 


RBC    2.81 L  (4.30-5.90)  m/uL


 


Hgb    7.7 L  (13.0-17.5)  gm/dL


 


Hct    24.9 L  (39.0-53.0)  %


 


MCHC    30.9 L  (31.0-37.0)  g/dL


 


RDW    16.0 H  (11.5-15.5)  %


 


Lymphocytes #    0.2 L  (1.0-4.8)  k/uL


 


Sodium     (137-145)  mmol/L


 


BUN     (9-20)  mg/dL


 


Glucose     (74-99)  mg/dL


 


POC Glucose (mg/dL)  117 H  137 H   (75-99)  mg/dL


 


Phosphorus     (2.5-4.5)  mg/dL


 


Total Protein     (6.3-8.2)  g/dL


 


Albumin     (3.5-5.0)  g/dL














  07/29/18 07/29/18 07/29/18 Range/Units





  02:00 05:55 11:19 


 


WBC     (3.8-10.6)  k/uL


 


RBC     (4.30-5.90)  m/uL


 


Hgb     (13.0-17.5)  gm/dL


 


Hct     (39.0-53.0)  %


 


MCHC     (31.0-37.0)  g/dL


 


RDW     (11.5-15.5)  %


 


Lymphocytes #     (1.0-4.8)  k/uL


 


Sodium  135 L    (137-145)  mmol/L


 


BUN  21 H    (9-20)  mg/dL


 


Glucose  130 H    (74-99)  mg/dL


 


POC Glucose (mg/dL)   138 H  130 H  (75-99)  mg/dL


 


Phosphorus  2.2 L    (2.5-4.5)  mg/dL


 


Total Protein  5.5 L    (6.3-8.2)  g/dL


 


Albumin  3.3 L    (3.5-5.0)  g/dL








 Microbiology - Last 24 Hours (Table)











 07/26/18 14:00 Catheter Tip Culture - Final





 Picc Line 


 


 07/26/18 11:30 Gram Stain - Final





 Abdomen Wound Culture - Final





    Klebsiella pneumoniae


 


 07/26/18 12:45 Anaerobic Culture - Preliminary





 Abdomen 














Assessment and Plan


Assessment: 





ASSESSMENT


Altered mental status, improved


Diffuse abdominal, back and chest pain.  Atypical for an acute coronary event.  

An acute coronary event has been ruled out.


B-cell lymphoma


Bacteremia





PLAN


An acute coronary event has been ruled out.


We will obtain 2-D echocardiogram and Doppler study to assess cardiac structure 

and function.


If echocardiogram is normal he is stable from a cardiac perspective.


Ongoing medical management per oncology and primary medical team.


Please feel free to call with further questions or concerns.





Nurse Practitioner note has been reviewed, I agree with a documented findings 

and plan of care.  Patient was seen and examined.

## 2018-07-30 LAB
ALBUMIN SERPL-MCNC: 3.5 G/DL (ref 3.5–5)
ALP SERPL-CCNC: 135 U/L (ref 38–126)
ALT SERPL-CCNC: 36 U/L (ref 21–72)
ANION GAP SERPL CALC-SCNC: 8 MMOL/L
AST SERPL-CCNC: 34 U/L (ref 17–59)
BASOPHILS # BLD AUTO: 0 K/UL (ref 0–0.2)
BASOPHILS NFR BLD AUTO: 0 %
BUN SERPL-SCNC: 24 MG/DL (ref 9–20)
CALCIUM SPEC-MCNC: 8.9 MG/DL (ref 8.4–10.2)
CHLORIDE SERPL-SCNC: 99 MMOL/L (ref 98–107)
CO2 SERPL-SCNC: 28 MMOL/L (ref 22–30)
EOSINOPHIL # BLD AUTO: 0.1 K/UL (ref 0–0.7)
EOSINOPHIL NFR BLD AUTO: 3 %
ERYTHROCYTE [DISTWIDTH] IN BLOOD BY AUTOMATED COUNT: 3.1 M/UL (ref 4.3–5.9)
ERYTHROCYTE [DISTWIDTH] IN BLOOD: 16.1 % (ref 11.5–15.5)
GLUCOSE BLD-MCNC: 108 MG/DL (ref 75–99)
GLUCOSE BLD-MCNC: 109 MG/DL (ref 75–99)
GLUCOSE BLD-MCNC: 125 MG/DL (ref 75–99)
GLUCOSE BLD-MCNC: 94 MG/DL (ref 75–99)
GLUCOSE SERPL-MCNC: 114 MG/DL (ref 74–99)
HCT VFR BLD AUTO: 27.7 % (ref 39–53)
HGB BLD-MCNC: 8.4 GM/DL (ref 13–17.5)
LYMPHOCYTES # SPEC AUTO: 0.2 K/UL (ref 1–4.8)
LYMPHOCYTES NFR SPEC AUTO: 7 %
MAGNESIUM SPEC-SCNC: 2.1 MG/DL (ref 1.6–2.3)
MCH RBC QN AUTO: 27.3 PG (ref 25–35)
MCHC RBC AUTO-ENTMCNC: 30.5 G/DL (ref 31–37)
MCV RBC AUTO: 89.4 FL (ref 80–100)
MONOCYTES # BLD AUTO: 0.2 K/UL (ref 0–1)
MONOCYTES NFR BLD AUTO: 9 %
NEUTROPHILS # BLD AUTO: 2.1 K/UL (ref 1.3–7.7)
NEUTROPHILS NFR BLD AUTO: 78 %
PLATELET # BLD AUTO: 251 K/UL (ref 150–450)
POTASSIUM SERPL-SCNC: 4.5 MMOL/L (ref 3.5–5.1)
PROT SERPL-MCNC: 5.9 G/DL (ref 6.3–8.2)
SODIUM SERPL-SCNC: 135 MMOL/L (ref 137–145)
WBC # BLD AUTO: 2.7 K/UL (ref 3.8–10.6)

## 2018-07-30 RX ADMIN — MORPHINE SULFATE PRN MG: 4 INJECTION, SOLUTION INTRAMUSCULAR; INTRAVENOUS at 05:18

## 2018-07-30 RX ADMIN — HEPARIN SODIUM SCH UNIT: 5000 INJECTION, SOLUTION INTRAVENOUS; SUBCUTANEOUS at 20:54

## 2018-07-30 RX ADMIN — SODIUM CHLORIDE SCH MLS/HR: 9 INJECTION, SOLUTION INTRAVENOUS at 18:09

## 2018-07-30 RX ADMIN — SODIUM PHOSPHATE, MONOBASIC, MONOHYDRATE SCH MLS/HR: 276; 142 INJECTION, SOLUTION INTRAVENOUS at 23:49

## 2018-07-30 RX ADMIN — MORPHINE SULFATE PRN MG: 4 INJECTION, SOLUTION INTRAMUSCULAR; INTRAVENOUS at 17:25

## 2018-07-30 RX ADMIN — SODIUM PHOSPHATE, MONOBASIC, MONOHYDRATE SCH MLS/HR: 276; 142 INJECTION, SOLUTION INTRAVENOUS at 12:22

## 2018-07-30 RX ADMIN — MEROPENEM SCH MLS/HR: 1 INJECTION, POWDER, FOR SOLUTION INTRAVENOUS at 01:19

## 2018-07-30 RX ADMIN — PANTOPRAZOLE SODIUM SCH MG: 40 INJECTION, POWDER, FOR SOLUTION INTRAVENOUS at 08:30

## 2018-07-30 RX ADMIN — SODIUM CHLORIDE SCH MLS/HR: 9 INJECTION, SOLUTION INTRAVENOUS at 05:55

## 2018-07-30 RX ADMIN — MEROPENEM SCH MLS/HR: 1 INJECTION, POWDER, FOR SOLUTION INTRAVENOUS at 17:26

## 2018-07-30 RX ADMIN — HEPARIN SODIUM SCH UNIT: 5000 INJECTION, SOLUTION INTRAVENOUS; SUBCUTANEOUS at 08:30

## 2018-07-30 RX ADMIN — MORPHINE SULFATE PRN MG: 4 INJECTION, SOLUTION INTRAMUSCULAR; INTRAVENOUS at 14:33

## 2018-07-30 RX ADMIN — MORPHINE SULFATE PRN MG: 4 INJECTION, SOLUTION INTRAMUSCULAR; INTRAVENOUS at 11:31

## 2018-07-30 RX ADMIN — ONDANSETRON PRN MG: 2 INJECTION INTRAMUSCULAR; INTRAVENOUS at 19:20

## 2018-07-30 RX ADMIN — MEROPENEM SCH MLS/HR: 1 INJECTION, POWDER, FOR SOLUTION INTRAVENOUS at 10:36

## 2018-07-30 RX ADMIN — FLUCONAZOLE SCH MG: 100 TABLET ORAL at 08:30

## 2018-07-30 RX ADMIN — DOCUSATE SODIUM SCH MG: 100 CAPSULE, LIQUID FILLED ORAL at 08:30

## 2018-07-30 RX ADMIN — MORPHINE SULFATE PRN MG: 4 INJECTION, SOLUTION INTRAMUSCULAR; INTRAVENOUS at 08:27

## 2018-07-30 RX ADMIN — PANTOPRAZOLE SODIUM SCH MG: 40 INJECTION, POWDER, FOR SOLUTION INTRAVENOUS at 20:54

## 2018-07-30 RX ADMIN — MORPHINE SULFATE PRN MG: 4 INJECTION, SOLUTION INTRAMUSCULAR; INTRAVENOUS at 20:54

## 2018-07-30 RX ADMIN — MORPHINE SULFATE PRN MG: 4 INJECTION, SOLUTION INTRAMUSCULAR; INTRAVENOUS at 01:41

## 2018-07-30 RX ADMIN — SOYBEAN OIL SCH MLS/HR: 20 INJECTION, SOLUTION INTRAVENOUS at 12:26

## 2018-07-30 RX ADMIN — SODIUM PHOSPHATE, MONOBASIC, MONOHYDRATE SCH MLS/HR: 276; 142 INJECTION, SOLUTION INTRAVENOUS at 02:05

## 2018-07-30 NOTE — PN
PROGRESS NOTE



DATE OF SERVICE:

07/30/2018



PRESENTING COMPLAINT:

Abdominal pain.



INTERVAL HISTORY:

This is a patient with advanced abdominal lymphoma, has a biliary drain in place.

Continues to have abdominal pain.  In and out of encephalopathy, though more awake.  I

saw this patient earlier today.  Patient has been on a full liquid diet.  Also getting

TPN lipids.



REVIEW OF SYSTEMS:

Done for constitutional, cardiovascular, GI, pulmonary and relevant findings as above.

The patient has not had a bowel movement.



CURRENT MEDICATIONS:

Include IV meropenem, TPN, lipids, and vancomycin.



PHYSICAL EXAMINATION:

VITAL SIGNS: Temperature 97.7 pulse 103, respiration 17, blood pressure 123/88, pulse

ox 100% on room.

GENERAL APPEARANCE:  Sitting up. Tired-appearing.

EYES:  Pupils equal. Conjunctivae pale.

HEENT:  External appearance of nose and ears normal.  Oral cavity normal.

NECK:  JVD not raised.  Mass not palpable.

RESPIRATORY effort normal.

LUNGS decreased breath sounds.

CARDIOVASCULAR:  1st and second sounds normal.  No edema.

ABDOMEN:  Tender.  Biliary drain in place.  No guarding or rigidity.

PSYCHIATRY:  Awake, answering questions. Anxious appearing.



INVESTIGATIONS:

White count 2.7, hemoglobin 8.4, potassium 4.5, BUN 24, creatinine 0.65.



ASSESSMENT:

1. Non-Hodgkin's lymphoma, stage IV, advanced, status post 4 cycles of R-CHOP with

    increasing persistent abdominal pain, uncontrolled.

2. Acute on chronic abdominal pain from above, not controlled.  Problems with becoming

    encephalopathic.

3. Pain Management was consulted.

4. Normocytic anemia from underlying lymphoma.

5. Mild protein-calorie malnutrition.

6. Metabolic encephalopathy from pain medication fluctuating, though better now.

7. Infection disease workup as per Dr. Louise in place.



PLAN:

Earlier in the day had spoken to the patient yet again of the fact that pain medication

is making him very drowsy if he goes higher.  The patient's sister and parents were

also present.  Late in the afternoon,  I did speak with the patient's wife and also

spoke this morning with Dr. Robison to speak to Dr. Pardo about further course of action.

Did tell the patient earlier today that if things do not work out good and we may be

leaning towards hospice.  Spoke to Dr. Pardo in the evening who did inform me that he

has spoken to the patient's wife.  At this point, decided to proceed with hospice.

Total time spent today was about 1 hour with over 35-40 minutes in discussion.





MMODL / IJN: [id number] / Job#: [id number]

## 2018-07-30 NOTE — ECHOF
Referral Reason:cp



MEASUREMENTS

--------

HEIGHT: 175.3 cm

WEIGHT: 65.3 kg

BP: 153/88

IVSd:   0.9 cm     (0.6 - 1.1)

LVIDd:   4.1 cm     (3.9 - 5.3)

LVPWd:   0.7 cm     (0.6 - 1.1)

IVSs:   1.4 cm

LVIDs:   2.6 cm

LVPWs:   1.1 cm

Ao Diam:   3.0 cm     (2.0 - 3.7)

AV Cusp:   2.2 cm     (1.5 - 2.6)

LA Diam:   3.5 cm     (2.7 - 3.8)

MV EXCURSION:   19.783 mm     (> 18.000)

MV EF SLOPE:   58 mm/s     (70 - 150)

EPSS:   1.1 cm

MV E Zane:   0.60 m/s

MV DecT:   287 ms

MV A Zane:   0.72 m/s

MV E/A Ratio:   0.84 

RAP:   5.00 mmHg

RVSP:   13.09 mmHg







FINDINGS

--------

Sinus rhythm.

This was a technically good study.

LV size, wall thickness and systolic function are normal, with an EF greater than 55%.   The left chichi
tricular size is normal.

The right ventricle is normal in size.

The left atrial size is normal.

The right atrial size is normal.

The aortic valve is trileaflet, and appears structurally normal. No aortic stenosis or regurgitation.


Mild mitral annular calcification present.   Mild mitral regurgitation is present.

Mild tricuspid regurgitation present.   There is no evidence of pulmonary hypertension.   The right v
entricular systolic pressure, as measured by Doppler, is 13.09mmHg.

There is no pulmonic regurgitation present.

The aortic root size is normal.

There is no pericardial effusion.



CONCLUSIONS

--------

1. LV size, wall thickness and systolic function are normal, with an EF greater than 55%.

2. The left ventricular size is normal.

3. The right ventricle is normal in size.

4. The left atrial size is normal.

5. The right atrial size is normal.

6. The aortic valve is trileaflet, and appears structurally normal. No aortic stenosis or regurgitati
on.

7. Mild mitral annular calcification present.

8. Mild mitral regurgitation is present.

9. Mild tricuspid regurgitation present.

10. There is no evidence of pulmonary hypertension.

11. The right ventricular systolic pressure, as measured by Doppler, is 13.09mmHg.

12. There is no pulmonic regurgitation present.

13. The aortic root size is normal.

14. There is no pericardial effusion.





SONOGRAPHER: Billie Holt RDCS

## 2018-07-30 NOTE — P.PN
Subjective


Progress Note Date: 07/30/18





58-year-old  male with a very extensive past medical history.  In 

September 2017 he was having abdominal pain and had extensive workup performed 

including a gastric resection with a Yisel-en-Y procedure.  Patient continue to 

have ongoing abdominal pain and follow-up imaging studies reveal evidence of 

extensive lymphadenopathy and when this was biopsy was found to be B-cell 

lymphoma.  The patient was initiated to 4 cycles of R CHOP chemotherapy.  First 

3 were well-tolerated however after the fourth cycle of chemotherapy he 

developed a bowel perforation and required an extensive course of treatment.  

The surgeons at the tertiary center were unable to perform a surgical 

intervention and percutaneous drainage was performed.  He does have a biliary 

drain remains in place at this point in time.  He has had significant weight 

loss and does maintain nutritional support via nighttime TPN.  The patient now 

has developed increasing abdominal pain.  Was seen in the oncology office 

without evidence of obstruction was given some hydration and sent home.  

However the pain increased greatlyand he was hospitalized pain medication was 

adjusted and 1 blood culture was positive for coagulase negative staph  No 

other positive cultures noted because of this the infectious disea The patient 

is arousable but obtunded the patient was discharged with   The patient now is 

admitted with altered mental status and concerns to further fever and sepsis.


07/27/2018 the patient is feeling somewhat better.  He is actually gone up in 

the hallway today because he is feeling much better his mentation has improved.

  The patient's wife is present and is asking what is his nutritional status 

such as he allowed to eat.


07/30/2018 the patient is comfortable today.  He has lying in bed with his head 

at the foot of the bed to be more comfortable.  Does not have further 

complaints at this time.





Objective





- Vital Signs


Vital signs: 


 Vital Signs











Temp  97.4 F L  07/30/18 15:01


 


Pulse  102 H  07/30/18 15:39


 


Resp  15   07/30/18 15:39


 


BP  135/75   07/30/18 15:01


 


Pulse Ox  100   07/30/18 15:01








 Intake & Output











 07/30/18 07/30/18 07/31/18





 06:59 18:59 06:59


 


Intake Total 600 1441 0


 


Output Total 1025 700 


 


Balance -425 741 0


 


Weight  65.678 kg 


 


Intake:   


 


  Intake, IV Titration 600 1441 





  Amount   


 


    Fat Emulsion 20% 250 ml @  60 





    20.833 mls/hr IV DAILY@   





    1300 [facility] Rx#:[id number]   


 


    Meropenem 2 gm In Sodium 100 100 





    Chloride 0.9% 100 ml @   





    200 mls/hr IVPB Q8H [facility]   





    Rx#:[id number]   


 


    Mvi, Adult No.4 with Vit  1031 





    K 10 ml Trace (Conc-1Ml/   





    Dose) 1 ml Parenteral   





    Electrolytes 20 ml In   





    Amino Acid 5%-D15w 1,000   





    ml @ 95 mls/hr IV .BY   





    DURATION [facility] Rx#:   





    [id number]   


 


    Vancomycin 1,250 mg In 500 250 





    Sodium Chloride 0.9% 250   





    ml @ 125 mls/hr IVPB Q12H   





    [facility] Rx#:[id number]   


 


  Oral 0  0


 


Output:   


 


  Drainage 425  


 


    Lower Abdomen 425  


 


  Urine 600 700 


 


Other:   


 


  Voiding Method Toilet Toilet 





 Urinal Urinal 


 


  # Voids 1 1 














- Exam


58-year-old  male who is underweight and chronically ill complains of 

ongoing abdominal pain


HEENT: Anicteric conjunctiva are pink and moist nasal mucosa grossly intact 

without significant lesions, there is no thrush.  Dentition is poor for age


Neck: The neck is supple without significant lymphadenopathy or thyromegaly.


Lungs: Good bilateral air entry without significant crackles or wheezing.  

There is no significant bronchial sounds.  There is no egophony or dullness.


Heart: Regular rate and rhythm with an audible S1-S2, no S3 no S4.  There is no 

significant murmur click or rub, PMI was nondisplaced.


Abdomen: Positive bowel sounds soft with some diffuse tenderness, the 

percutaneous drainage of biliary tract his ongoing drainage, splenomegaly is 

palpated, no other abdominal masses noted.  There was no guarding or rebound.  

The abdomen is not rigid but there is irritation to the skin around the tube 

that is having some significant drainage.


Extremities: The upper extremities have excellent pulses they are symmetric, no 

significant petechiae or telangiectasia.  No splinter hemorrhages were noted.  

The lower extremities are free from significant edema.  The peripheral pulses 

were 2+ and symmetric.  PICC line in place left upper extremity without 

difficulties


Neuro: patient is now awake alert interactive and is able to ambulate





- Labs


CBC & Chem 7: 


 07/30/18 05:22





 07/30/18 05:22


Labs: 


 Abnormal Lab Results - Last 24 Hours (Table)











  07/29/18 07/30/18 07/30/18 Range/Units





  23:44 05:22 05:22 


 


WBC   2.7 L   (3.8-10.6)  k/uL


 


RBC   3.10 L   (4.30-5.90)  m/uL


 


Hgb   8.4 L   (13.0-17.5)  gm/dL


 


Hct   27.7 L   (39.0-53.0)  %


 


MCHC   30.5 L   (31.0-37.0)  g/dL


 


RDW   16.1 H   (11.5-15.5)  %


 


Lymphocytes #   0.2 L   (1.0-4.8)  k/uL


 


Sodium    135 L  (137-145)  mmol/L


 


BUN    24 H  (9-20)  mg/dL


 


Creatinine    0.65 L  (0.66-1.25)  mg/dL


 


Glucose    114 H  (74-99)  mg/dL


 


POC Glucose (mg/dL)  117 H    (75-99)  mg/dL


 


Phosphorus    2.2 L  (2.5-4.5)  mg/dL


 


Alkaline Phosphatase    135 H  ()  U/L


 


Total Protein    5.9 L  (6.3-8.2)  g/dL














  07/30/18 07/30/18 07/30/18 Range/Units





  06:08 11:22 18:04 


 


WBC     (3.8-10.6)  k/uL


 


RBC     (4.30-5.90)  m/uL


 


Hgb     (13.0-17.5)  gm/dL


 


Hct     (39.0-53.0)  %


 


MCHC     (31.0-37.0)  g/dL


 


RDW     (11.5-15.5)  %


 


Lymphocytes #     (1.0-4.8)  k/uL


 


Sodium     (137-145)  mmol/L


 


BUN     (9-20)  mg/dL


 


Creatinine     (0.66-1.25)  mg/dL


 


Glucose     (74-99)  mg/dL


 


POC Glucose (mg/dL)  125 H  108 H  109 H  (75-99)  mg/dL


 


Phosphorus     (2.5-4.5)  mg/dL


 


Alkaline Phosphatase     ()  U/L


 


Total Protein     (6.3-8.2)  g/dL








 Microbiology - Last 24 Hours (Table)











 07/26/18 12:45 Anaerobic Culture - Final





 Abdomen 








 Laboratory Results











WBC  2.7 k/uL (3.8-10.6)  L  07/30/18  05:22    


 


RBC  3.10 m/uL (4.30-5.90)  L  07/30/18  05:22    


 


Hgb  8.4 gm/dL (13.0-17.5)  L  07/30/18  05:22    


 


Hct  27.7 % (39.0-53.0)  L  07/30/18  05:22    


 


MCV  89.4 fL (80.0-100.0)   07/30/18  05:22    


 


MCH  27.3 pg (25.0-35.0)   07/30/18  05:22    


 


MCHC  30.5 g/dL (31.0-37.0)  L  07/30/18  05:22    


 


RDW  16.1 % (11.5-15.5)  H  07/30/18  05:22    


 


Plt Count  251 k/uL (150-450)   07/30/18  05:22    


 


Neutrophils %  78 %  07/30/18  05:22    


 


Lymphocytes %  7 %  07/30/18  05:22    


 


Monocytes %  9 %  07/30/18  05:22    


 


Eosinophils %  3 %  07/30/18  05:22    


 


Basophils %  0 %  07/30/18  05:22    


 


Neutrophils #  2.1 k/uL (1.3-7.7)   07/30/18  05:22    


 


Lymphocytes #  0.2 k/uL (1.0-4.8)  L  07/30/18  05:22    


 


Monocytes #  0.2 k/uL (0-1.0)   07/30/18  05:22    


 


Eosinophils #  0.1 k/uL (0-0.7)   07/30/18  05:22    


 


Basophils #  0.0 k/uL (0-0.2)   07/30/18  05:22    


 


Hypochromasia  Marked   07/30/18  05:22    


 


Poikilocytosis  Slight   07/30/18  05:22    


 


Anisocytosis  Slight   07/30/18  05:22    


 


PT  10.5 sec (9.0-12.0)   07/25/18  12:10    


 


INR  1.1  (<1.2)   07/25/18  12:10    


 


APTT  29.2 sec (22.0-30.0)   07/25/18  12:10    


 


Sodium  135 mmol/L (137-145)  L  07/30/18  05:22    


 


Potassium  4.5 mmol/L (3.5-5.1)   07/30/18  05:22    


 


Chloride  99 mmol/L ()   07/30/18  05:22    


 


Carbon Dioxide  28 mmol/L (22-30)   07/30/18  05:22    


 


Anion Gap  8 mmol/L  07/30/18  05:22    


 


BUN  24 mg/dL (9-20)  H  07/30/18  05:22    


 


Creatinine  0.65 mg/dL (0.66-1.25)  L  07/30/18  05:22    


 


Est GFR (CKD-EPI)AfAm  >90  (>60 ml/min/1.73 sqM)   07/30/18  05:22    


 


Est GFR (CKD-EPI)NonAf  >90  (>60 ml/min/1.73 sqM)   07/30/18  05:22    


 


Glucose  114 mg/dL (74-99)  H  07/30/18  05:22    


 


POC Glucose (mg/dL)  109 mg/dL (75-99)  H  07/30/18  18:04    


 


POC Glu Operater ID  Charlie Mckeon   07/30/18  18:04    


 


Estimated Ave Glu mg/dL  91   07/27/18  07:00    


 


Hemoglobin A1c  4.8 % (4.0-6.0)   07/27/18  07:00    


 


Plasma Lactic Acid Len  1.1 mmol/L (0.7-2.0)   07/25/18  12:10    


 


Calcium  8.9 mg/dL (8.4-10.2)   07/30/18  05:22    


 


Phosphorus  2.2 mg/dL (2.5-4.5)  L  07/30/18  05:22    


 


Magnesium  2.1 mg/dL (1.6-2.3)   07/30/18  05:22    


 


Total Bilirubin  0.8 mg/dL (0.2-1.3)   07/30/18  05:22    


 


AST  34 U/L (17-59)   07/30/18  05:22    


 


ALT  36 U/L (21-72)   07/30/18  05:22    


 


Alkaline Phosphatase  135 U/L ()  H  07/30/18  05:22    


 


Ammonia  <9 umol/L (<30)   07/25/18  12:10    


 


Troponin I  <0.012 ng/mL (0.000-0.034)   07/29/18  02:00    


 


Total Protein  5.9 g/dL (6.3-8.2)  L  07/30/18  05:22    


 


Albumin  3.5 g/dL (3.5-5.0)   07/30/18  05:22    


 


Triglycerides  170 mg/dL (<150)  H  07/27/18  07:00    


 


Amylase  <30 U/L ()  L  07/25/18  12:10    


 


Lipase  15 U/L ()  L  07/25/18  12:10    


 


Urine Color  Yellow   07/25/18  12:10    


 


Urine Appearance  Clear  (Clear)   07/25/18  12:10    


 


Urine pH  7.0  (5.0-8.0)   07/25/18  12:10    


 


Ur Specific Gravity  1.015  (1.001-1.035)   07/25/18  12:10    


 


Urine Protein  Trace  (Negative)  H  07/25/18  12:10    


 


Urine Glucose (UA)  Negative  (Negative)   07/25/18  12:10    


 


Urine Ketones  Negative  (Negative)   07/25/18  12:10    


 


Urine Blood  Negative  (Negative)   07/25/18  12:10    


 


Urine Nitrite  Negative  (Negative)   07/25/18  12:10    


 


Urine Bilirubin  Negative  (Negative)   07/25/18  12:10    


 


Urine Urobilinogen  <2.0 mg/dL (<2.0)   07/25/18  12:10    


 


Ur Leukocyte Esterase  Negative  (Negative)   07/25/18  12:10    


 


Vancomycin Trough  19.2 ug/mL  07/30/18  05:22    


 


Urine Opiates Screen  Detected  (NotDetected)  H  07/25/18  12:10    


 


Ur Oxycodone Screen  Not Detected  (NotDetected)   07/25/18  12:10    


 


Urine Methadone Screen  Not Detected  (NotDetected)   07/25/18  12:10    


 


Ur Propoxyphene Screen  Not Detected  (NotDetected)   07/25/18  12:10    


 


Ur Barbiturates Screen  Not Detected  (NotDetected)   07/25/18  12:10    


 


U Tricyclic Antidepress  Not Detected  (NotDetected)   07/25/18  12:10    


 


Ur Phencyclidine Scrn  Not Detected  (NotDetected)   07/25/18  12:10    


 


Ur Amphetamines Screen  Not Detected  (NotDetected)   07/25/18  12:10    


 


U Methamphetamines Scrn  Not Detected  (NotDetected)   07/25/18  12:10    


 


U Benzodiazepines Scrn  Detected  (NotDetected)  H  07/25/18  12:10    


 


Urine Cocaine Screen  Not Detected  (NotDetected)   07/25/18  12:10    


 


U Marijuana (THC) Screen  Detected  (NotDetected)  H  07/25/18  12:10    








 Microbiology





07/26/18 12:45   Abdomen   Anaerobic Culture - Final


07/26/18 14:00   Picc Line   Catheter Tip Culture - Final


07/26/18 11:30   Abdomen   Gram Stain - Final


07/26/18 11:30   Abdomen   Wound Culture - Final


                            Klebsiella pneumoniae


07/25/18 12:10   Blood   Blood Culture Gram Stain - Final


07/25/18 12:10   Blood   Blood Culture - Final


                            Coagulase Negative Staph


07/25/18 12:10   Blood   Blood Culture - Final











Assessment and Plan


(1) Altered mental status


Current Visit: Yes   Status: Acute   Code(s): R41.82 - ALTERED MENTAL STATUS, 

UNSPECIFIED   SNOMED Code(s): 568496987


   





(2) B-cell lymphoma


Current Visit: No   Status: Chronic   Priority: High   Code(s): C85.10 - 

UNSPECIFIED B-CELL LYMPHOMA, UNSPECIFIED SITE   SNOMED Code(s): 477027438


   





(3) Gram-positive cocci bacteremia


Narrative/Plan: 


58-year-old  male who has lymphoma has had recent hospitalization at 

which time there was evidence of gram-positive cocci in the blood cu  One 

culture had evidence of coagulase negative staph and was thought to be a 

contamination.  plans for outpatient exchange of the pICC line that is utilized 

for his TPN  this has now occurred and there is positive blood culture from the 

time of this occurrence.


Antibiotic therapy is with vancomycin at this point in time until final culture 

results.  Further cultures if febrile,  the patient does have new PICC left arm 

and will be monitored.Altered mental status is from  a combination of cancer, 

nutrition and infection. Prognosis is poor.


07/27/2018 the patient is improved since coming to Hospital, receiving fluids, 

antibiotic therapy, starting of his TPN and exchange of his PICC line.  

Antibiotic therapy is currently with Merrem and vancomycin antifungal therapy 

with fluconazole while cultures are pending.


Query to the surgeon is whether not the patient is allowed to ingest any food 

or fluids.


07/30/2018 the patient has had some improvement.  He is doing well at this 

point in time with current therapy that includes meropenem, vancomycin and 

fluconazole.  Cultures have been finalized, and patient is clinically improved.

  With exchange of the PICC line he has had significant improvement.  At this 

time planning a 7 day course of treatment with the antibiotic and antifungal 

therapy.  Surgery is allowing him to have a liquid diet he seems to be 

tolerating relatively well


Current Visit: No   Status: Acute   Code(s): R78.81 - BACTEREMIA   SNOMED Code(s

): 569580025680

## 2018-07-31 LAB
ALBUMIN SERPL-MCNC: 3.2 G/DL (ref 3.5–5)
ALP SERPL-CCNC: 144 U/L (ref 38–126)
ALT SERPL-CCNC: 38 U/L (ref 21–72)
ANION GAP SERPL CALC-SCNC: 5 MMOL/L
AST SERPL-CCNC: 41 U/L (ref 17–59)
BASOPHILS # BLD AUTO: 0 K/UL (ref 0–0.2)
BASOPHILS NFR BLD AUTO: 0 %
BUN SERPL-SCNC: 28 MG/DL (ref 9–20)
CALCIUM SPEC-MCNC: 8.9 MG/DL (ref 8.4–10.2)
CHLORIDE SERPL-SCNC: 98 MMOL/L (ref 98–107)
CO2 SERPL-SCNC: 30 MMOL/L (ref 22–30)
EOSINOPHIL # BLD AUTO: 0.1 K/UL (ref 0–0.7)
EOSINOPHIL NFR BLD AUTO: 4 %
ERYTHROCYTE [DISTWIDTH] IN BLOOD BY AUTOMATED COUNT: 2.99 M/UL (ref 4.3–5.9)
ERYTHROCYTE [DISTWIDTH] IN BLOOD: 16 % (ref 11.5–15.5)
GLUCOSE BLD-MCNC: 112 MG/DL (ref 75–99)
GLUCOSE BLD-MCNC: 113 MG/DL (ref 75–99)
GLUCOSE BLD-MCNC: 118 MG/DL (ref 75–99)
GLUCOSE BLD-MCNC: 124 MG/DL (ref 75–99)
GLUCOSE SERPL-MCNC: 104 MG/DL (ref 74–99)
HCT VFR BLD AUTO: 26.5 % (ref 39–53)
HGB BLD-MCNC: 8 GM/DL (ref 13–17.5)
LYMPHOCYTES # SPEC AUTO: 0.3 K/UL (ref 1–4.8)
LYMPHOCYTES NFR SPEC AUTO: 10 %
MAGNESIUM SPEC-SCNC: 2.2 MG/DL (ref 1.6–2.3)
MCH RBC QN AUTO: 26.9 PG (ref 25–35)
MCHC RBC AUTO-ENTMCNC: 30.2 G/DL (ref 31–37)
MCV RBC AUTO: 88.9 FL (ref 80–100)
MONOCYTES # BLD AUTO: 0.3 K/UL (ref 0–1)
MONOCYTES NFR BLD AUTO: 10 %
NEUTROPHILS # BLD AUTO: 2.3 K/UL (ref 1.3–7.7)
NEUTROPHILS NFR BLD AUTO: 73 %
PLATELET # BLD AUTO: 252 K/UL (ref 150–450)
POTASSIUM SERPL-SCNC: 4.5 MMOL/L (ref 3.5–5.1)
PROT SERPL-MCNC: 5.5 G/DL (ref 6.3–8.2)
SODIUM SERPL-SCNC: 133 MMOL/L (ref 137–145)
WBC # BLD AUTO: 3.2 K/UL (ref 3.8–10.6)

## 2018-07-31 RX ADMIN — PANTOPRAZOLE SODIUM SCH MG: 40 INJECTION, POWDER, FOR SOLUTION INTRAVENOUS at 20:21

## 2018-07-31 RX ADMIN — PANTOPRAZOLE SODIUM SCH MG: 40 INJECTION, POWDER, FOR SOLUTION INTRAVENOUS at 08:40

## 2018-07-31 RX ADMIN — SOYBEAN OIL SCH MLS/HR: 20 INJECTION, SOLUTION INTRAVENOUS at 15:02

## 2018-07-31 RX ADMIN — MORPHINE SULFATE PRN MG: 4 INJECTION, SOLUTION INTRAMUSCULAR; INTRAVENOUS at 19:36

## 2018-07-31 RX ADMIN — MORPHINE SULFATE PRN MG: 4 INJECTION, SOLUTION INTRAMUSCULAR; INTRAVENOUS at 00:01

## 2018-07-31 RX ADMIN — MORPHINE SULFATE PRN MG: 4 INJECTION, SOLUTION INTRAMUSCULAR; INTRAVENOUS at 16:29

## 2018-07-31 RX ADMIN — MORPHINE SULFATE PRN MG: 4 INJECTION, SOLUTION INTRAMUSCULAR; INTRAVENOUS at 13:35

## 2018-07-31 RX ADMIN — MEROPENEM SCH MLS/HR: 1 INJECTION, POWDER, FOR SOLUTION INTRAVENOUS at 00:54

## 2018-07-31 RX ADMIN — MEROPENEM SCH MLS/HR: 1 INJECTION, POWDER, FOR SOLUTION INTRAVENOUS at 08:41

## 2018-07-31 RX ADMIN — MORPHINE SULFATE PRN MG: 4 INJECTION, SOLUTION INTRAMUSCULAR; INTRAVENOUS at 06:57

## 2018-07-31 RX ADMIN — SODIUM PHOSPHATE, MONOBASIC, MONOHYDRATE SCH MLS/HR: 276; 142 INJECTION, SOLUTION INTRAVENOUS at 21:23

## 2018-07-31 RX ADMIN — ONDANSETRON PRN MG: 2 INJECTION INTRAMUSCULAR; INTRAVENOUS at 08:40

## 2018-07-31 RX ADMIN — SODIUM PHOSPHATE, MONOBASIC, MONOHYDRATE SCH MLS/HR: 276; 142 INJECTION, SOLUTION INTRAVENOUS at 10:17

## 2018-07-31 RX ADMIN — SODIUM CHLORIDE SCH MLS/HR: 9 INJECTION, SOLUTION INTRAVENOUS at 17:57

## 2018-07-31 RX ADMIN — MEROPENEM SCH MLS/HR: 1 INJECTION, POWDER, FOR SOLUTION INTRAVENOUS at 16:30

## 2018-07-31 RX ADMIN — SODIUM CHLORIDE SCH MLS/HR: 9 INJECTION, SOLUTION INTRAVENOUS at 06:24

## 2018-07-31 RX ADMIN — FLUCONAZOLE SCH MG: 100 TABLET ORAL at 08:41

## 2018-07-31 RX ADMIN — MORPHINE SULFATE PRN MG: 4 INJECTION, SOLUTION INTRAMUSCULAR; INTRAVENOUS at 03:00

## 2018-07-31 RX ADMIN — HEPARIN SODIUM SCH UNIT: 5000 INJECTION, SOLUTION INTRAVENOUS; SUBCUTANEOUS at 08:41

## 2018-07-31 RX ADMIN — MORPHINE SULFATE PRN MG: 4 INJECTION, SOLUTION INTRAMUSCULAR; INTRAVENOUS at 10:11

## 2018-07-31 RX ADMIN — HEPARIN SODIUM SCH UNIT: 5000 INJECTION, SOLUTION INTRAVENOUS; SUBCUTANEOUS at 20:21

## 2018-07-31 RX ADMIN — DOCUSATE SODIUM SCH MG: 100 CAPSULE, LIQUID FILLED ORAL at 08:41

## 2018-07-31 RX ADMIN — MORPHINE SULFATE PRN MG: 4 INJECTION, SOLUTION INTRAMUSCULAR; INTRAVENOUS at 22:49

## 2018-07-31 NOTE — P.PN
Subjective


Progress Note Date: 07/31/18


Principal diagnosis: 





Acute Mental Status Changes





No acute events overnight, patient is resting, no family at bedside during 

assessment





Objective





- Vital Signs


Vital signs: 


 Vital Signs











Temp  97.5 F L  07/31/18 14:08


 


Pulse  97   07/31/18 14:08


 


Resp  18   07/31/18 14:08


 


BP  111/71   07/31/18 14:08


 


Pulse Ox  100   07/31/18 14:08








 Intake & Output











 07/30/18 07/31/18 07/31/18





 18:59 06:59 18:59


 


Intake Total 1441 1510 


 


Output Total 700 250 350


 


Balance 741 1260 -350


 


Weight 65.678 kg  


 


Intake:   


 


  Intake, IV Titration 1441 1270 





  Amount   


 


    Fat Emulsion 20% 250 ml @ 60  





    20.833 mls/hr IV DAILY@   





    1300 [facility] Rx#:[id number]   


 


    Meropenem 2 gm In Sodium 100  





    Chloride 0.9% 100 ml @   





    200 mls/hr IVPB Q8H [facility]   





    Rx#:[id number]   


 


    Mvi, Adult No.4 with Vit 1031  





    K 10 ml Trace (Conc-1Ml/   





    Dose) 1 ml Parenteral   





    Electrolytes 20 ml In   





    Amino Acid 5%-D15w 1,000   





    ml @ 95 mls/hr IV .BY   





    DURATION [facility] Rx#:   





    [id number]   


 


    Parenteral Electrolytes  1020 





    20 ml In Amino Acid 5%-   





    D15w 1,000 ml @ 95 mls/hr   





    IV .BY DURATION [facility] Rx#:   





    [id number]   


 


    Vancomycin 1,250 mg In 250 250 





    Sodium Chloride 0.9% 250   





    ml @ 125 mls/hr IVPB Q12H   





    [facility] Rx#:[id number]   


 


  Oral  240 


 


Output:   


 


  Drainage  250 


 


    Lower Abdomen  250 


 


  Urine 700  350


 


  Stool   0


 


Other:   


 


  Voiding Method Toilet Toilet Toilet





 Urinal Urinal Urinal


 


  # Voids 1 200 














- Constitutional


General appearance: Present: cooperative, no acute distress





- EENT


Eyes: Present: EOMI, dentition normal


ENT: Present: NA/AT, normal oropharynx, thrush





- Neck


Details: 





supple, Trachea Midline


Neck: Present: normal ROM





- Respiratory


Respiratory: bilateral: CTA





- Cardiovascular


Rhythm: regular





- Gastrointestinal


General gastrointestinal: Present: distended, soft





- Integumentary


Integumentary: Present: normal





- Neurologic


Neurologic: Present: CNII-XII intact





- Musculoskeletal


Musculoskeletal: Present: generalized weakness, strength equal bilaterally





- Psychiatric


Psychiatric Comment(s): 





inapprpriately answering questions at time. 


Psychiatric: Present: A&O x's 3





- Labs


CBC & Chem 7: 


 07/31/18 07:12





 07/31/18 07:12


Labs: 


 Abnormal Lab Results - Last 24 Hours (Table)











  07/30/18 07/31/18 07/31/18 Range/Units





  18:04 06:44 07:12 


 


WBC    3.2 L  (3.8-10.6)  k/uL


 


RBC    2.99 L  (4.30-5.90)  m/uL


 


Hgb    8.0 L  (13.0-17.5)  gm/dL


 


Hct    26.5 L  (39.0-53.0)  %


 


MCHC    30.2 L  (31.0-37.0)  g/dL


 


RDW    16.0 H  (11.5-15.5)  %


 


Lymphocytes #    0.3 L  (1.0-4.8)  k/uL


 


Sodium     (137-145)  mmol/L


 


BUN     (9-20)  mg/dL


 


Glucose     (74-99)  mg/dL


 


POC Glucose (mg/dL)  109 H  113 H   (75-99)  mg/dL


 


Phosphorus     (2.5-4.5)  mg/dL


 


Alkaline Phosphatase     ()  U/L


 


Total Protein     (6.3-8.2)  g/dL


 


Albumin     (3.5-5.0)  g/dL














  07/31/18 07/31/18 07/31/18 Range/Units





  07:12 11:48 17:12 


 


WBC     (3.8-10.6)  k/uL


 


RBC     (4.30-5.90)  m/uL


 


Hgb     (13.0-17.5)  gm/dL


 


Hct     (39.0-53.0)  %


 


MCHC     (31.0-37.0)  g/dL


 


RDW     (11.5-15.5)  %


 


Lymphocytes #     (1.0-4.8)  k/uL


 


Sodium  133 L    (137-145)  mmol/L


 


BUN  28 H    (9-20)  mg/dL


 


Glucose  104 H    (74-99)  mg/dL


 


POC Glucose (mg/dL)   112 H  118 H  (75-99)  mg/dL


 


Phosphorus  2.4 L    (2.5-4.5)  mg/dL


 


Alkaline Phosphatase  144 H    ()  U/L


 


Total Protein  5.5 L    (6.3-8.2)  g/dL


 


Albumin  3.2 L    (3.5-5.0)  g/dL








 Microbiology - Last 24 Hours (Table)











 07/26/18 12:45 Anaerobic Culture - Final





 Abdomen 














Assessment and Plan


Plan: 





Assessment and Plan


(1) Confusion


Narrative/Plan: 


Case discussed with IM.  Plan is to work up the biliary drain and treat for 

sepsis.  If no improvement in condition LP will be considered for evaluation of 

cerebral spinal fluid to rule out CNS involvement.  


Pain medications have all been discontinued other then morphine IVP to decrease 

possible narcotic induced lethargy/confusion


ID, Surgery and IR consulted 


 - Mild improvement in mental status, although still not baseline with the 

acute change


 - Unable to obtain MRI brain for agitation and claustrophobia,


 - Patient and wife discussed plan with Dr. Pardo on Phone last night. The plan 

the patient and wife and Dr. Pardo have agreed apon is hospice. Hospice will be 

consulted today for an informational meeting and plan will be home hospice at 

discharge.  





Current Visit: Yes   Status: Acute   Priority: High   Code(s): R41.0 - 

DISORIENTATION, UNSPECIFIED   SNOMED Code(s): 933979976


   





(2) B-cell lymphoma


Narrative/Plan: 


Pt recently had PET with mixed findings.  Plan was to follow pt and consider 

repeat scan or start treatment if pt was symptomatic.  Pt current condition has 

progressively worsened in the last 10-14 days. Not a candidate for further 

chemotherapy at this time.  





Current Visit: No   Status: Chronic   Priority: High   Code(s): C85.10 - 

UNSPECIFIED B-CELL LYMPHOMA, UNSPECIFIED SITE   SNOMED Code(s): 088357686

## 2018-07-31 NOTE — P.PN
Subjective


Progress Note Date: 07/31/18





58-year-old  male with a very extensive past medical history.  In 

September 2017 he was having abdominal pain and had extensive workup performed 

including a gastric resection with a Yisel-en-Y procedure.  Patient continue to 

have ongoing abdominal pain and follow-up imaging studies reveal evidence of 

extensive lymphadenopathy and when this was biopsy was found to be B-cell 

lymphoma.  The patient was initiated to 4 cycles of R CHOP chemotherapy.  First 

3 were well-tolerated however after the fourth cycle of chemotherapy he 

developed a bowel perforation and required an extensive course of treatment.  

The surgeons at the tertiary center were unable to perform a surgical 

intervention and percutaneous drainage was performed.  He does have a biliary 

drain remains in place at this point in time.  He has had significant weight 

loss and does maintain nutritional support via nighttime TPN.  The patient now 

has developed increasing abdominal pain.  Was seen in the oncology office 

without evidence of obstruction was given some hydration and sent home.  

However the pain increased greatlyand he was hospitalized pain medication was 

adjusted and 1 blood culture was positive for coagulase negative staph  No 

other positive cultures noted because of this the infectious disea The patient 

is arousable but obtunded the patient was discharged with   The patient now is 

admitted with altered mental status and concerns to further fever and sepsis.


07/27/2018 the patient is feeling somewhat better.  He is actually gone up in 

the hallway today because he is feeling much better his mentation has improved.

  The patient's wife is present and is asking what is his nutritional status 

such as he allowed to eat.


07/30/2018 the patient is comfortable today.  He has lying in bed with his head 

at the foot of the bed to be more comfortable.  Does not have further 

complaints at this time.


07/31/2018 the patient is comfortable and sedated.  No other new acute 

complaints





Objective





- Vital Signs


Vital signs: 


 Vital Signs











Temp  98.6 F   07/31/18 21:13


 


Pulse  98   07/31/18 21:13


 


Resp  18   07/31/18 21:13


 


BP  112/68   07/31/18 21:13


 


Pulse Ox  100   07/31/18 21:13








 Intake & Output











 07/31/18 07/31/18 08/01/18





 06:59 18:59 06:59


 


Intake Total 1510 1031 


 


Output Total 250 350 


 


Balance 1260 681 


 


Intake:   


 


  Intake, IV Titration 1270 1031 





  Amount   


 


    Mvi, Adult No.4 with Vit  1031 





    K 10 ml Trace (Conc-1Ml/   





    Dose) 1 ml Parenteral   





    Electrolytes 20 ml In   





    Amino Acid 5%-D15w 1,000   





    ml @ 95 mls/hr IV .BY   





    DURATION [facility] Rx#:   





    [id number]   


 


    Parenteral Electrolytes 1020  





    20 ml In Amino Acid 5%-   





    D15w 1,000 ml @ 95 mls/hr   





    IV .BY DURATION [facility] Rx#:   





    [id number]   


 


    Vancomycin 1,250 mg In 250  





    Sodium Chloride 0.9% 250   





    ml @ 125 mls/hr IVPB Q12H   





    [facility] Rx#:[id number]   


 


  Oral 240  


 


Output:   


 


  Drainage 250  


 


    Lower Abdomen 250  


 


  Urine  350 


 


  Stool  0 


 


Other:   


 


  Voiding Method Toilet Toilet 





 Urinal Urinal 


 


  # Voids 200  1














- Exam


58-year-old  male who is underweight and chronically ill complains of 

ongoing abdominal pain


HEENT: Anicteric conjunctiva are pink and moist nasal mucosa grossly intact 

without significant lesions, there is no thrush.  Dentition is poor for age


Neck: The neck is supple without significant lymphadenopathy or thyromegaly.


Lungs: Good bilateral air entry without significant crackles or wheezing.  

There is no significant bronchial sounds.  There is no egophony or dullness.


Heart: Regular rate and rhythm with an audible S1-S2, no S3 no S4.  There is no 

significant murmur click or rub, PMI was nondisplaced.


Abdomen: Positive bowel sounds soft with some diffuse tenderness, the 

percutaneous drainage of biliary tract his ongoing drainage, splenomegaly is 

palpated, no other abdominal masses noted.  There was no guarding or rebound.  

The abdomen is not rigid but there is irritation to the skin around the tube 

that is having some significant drainage.


Extremities: The upper extremities have excellent pulses they are symmetric, no 

significant petechiae or telangiectasia.  No splinter hemorrhages were noted.  

The lower extremities are free from significant edema.  The peripheral pulses 

were 2+ and symmetric.  PICC line in place left upper extremity without 

difficulties


Neuro: patient is quite sedated today





- Labs


CBC & Chem 7: 


 07/31/18 07:12





 07/31/18 07:12


Labs: 


 Abnormal Lab Results - Last 24 Hours (Table)











  07/31/18 07/31/18 07/31/18 Range/Units





  06:44 07:12 07:12 


 


WBC   3.2 L   (3.8-10.6)  k/uL


 


RBC   2.99 L   (4.30-5.90)  m/uL


 


Hgb   8.0 L   (13.0-17.5)  gm/dL


 


Hct   26.5 L   (39.0-53.0)  %


 


MCHC   30.2 L   (31.0-37.0)  g/dL


 


RDW   16.0 H   (11.5-15.5)  %


 


Lymphocytes #   0.3 L   (1.0-4.8)  k/uL


 


Sodium    133 L  (137-145)  mmol/L


 


BUN    28 H  (9-20)  mg/dL


 


Glucose    104 H  (74-99)  mg/dL


 


POC Glucose (mg/dL)  113 H    (75-99)  mg/dL


 


Phosphorus    2.4 L  (2.5-4.5)  mg/dL


 


Alkaline Phosphatase    144 H  ()  U/L


 


Total Protein    5.5 L  (6.3-8.2)  g/dL


 


Albumin    3.2 L  (3.5-5.0)  g/dL














  07/31/18 07/31/18 Range/Units





  11:48 17:12 


 


WBC    (3.8-10.6)  k/uL


 


RBC    (4.30-5.90)  m/uL


 


Hgb    (13.0-17.5)  gm/dL


 


Hct    (39.0-53.0)  %


 


MCHC    (31.0-37.0)  g/dL


 


RDW    (11.5-15.5)  %


 


Lymphocytes #    (1.0-4.8)  k/uL


 


Sodium    (137-145)  mmol/L


 


BUN    (9-20)  mg/dL


 


Glucose    (74-99)  mg/dL


 


POC Glucose (mg/dL)  112 H  118 H  (75-99)  mg/dL


 


Phosphorus    (2.5-4.5)  mg/dL





 


Alkaline Phosphatase    ()  U/L


 


Total Protein    (6.3-8.2)  g/dL


 


Albumin    (3.5-5.0)  g/dL








 Microbiology





07/26/18 12:45   Abdomen   Anaerobic Culture - Final


07/26/18 14:00   Picc Line   Catheter Tip Culture - Final


07/26/18 11:30   Abdomen   Gram Stain - Final


07/26/18 11:30   Abdomen   Wound Culture - Final


                            Klebsiella pneumoniae


07/25/18 12:10   Blood   Blood Culture Gram Stain - Final


07/25/18 12:10   Blood   Blood Culture - Final


                            Coagulase Negative Staph


07/25/18 12:10   Blood   Blood Culture - Final











Assessment and Plan


(1) Altered mental status


Current Visit: Yes   Status: Acute   Code(s): R41.82 - ALTERED MENTAL STATUS, 

UNSPECIFIED   SNOMED Code(s): 091294346


   





(2) B-cell lymphoma


Current Visit: No   Status: Chronic   Priority: High   Code(s): C85.10 - 

UNSPECIFIED B-CELL LYMPHOMA, UNSPECIFIED SITE   SNOMED Code(s): 295541308


   





(3) Gram-positive cocci bacteremia


Narrative/Plan: 


58-year-old  male who has lymphoma has had recent hospitalization at 

which time there was evidence of gram-positive cocci in the blood cu  One 

culture had evidence of coagulase negative staph and was thought to be a 

contamination.  plans for outpatient exchange of the pICC line that is utilized 

for his TPN  this has now occurred and there is positive blood culture from the 

time of this occurrence.


Antibiotic therapy is with vancomycin at this point in time until final culture 

results.  Further cultures if febrile,  the patient does have new PICC left arm 

and will be monitored.Altered mental status is from  a combination of cancer, 

nutrition and infection. Prognosis is poor.


07/27/2018 the patient is improved since coming to Hospital, receiving fluids, 

antibiotic therapy, starting of his TPN and exchange of his PICC line.  

Antibiotic therapy is currently with Merrem and vancomycin antifungal therapy 

with fluconazole while cultures are pending.


Query to the surgeon is whether not the patient is allowed to ingest any food 

or fluids.


07/30/2018 the patient has had some improvement.  He is doing well at this 

point in time with current therapy that includes meropenem, vancomycin and 

fluconazole.  Cultures have been finalized, and patient is clinically improved.

  With exchange of the PICC line he has had significant improvement.  At this 

time planning a 7 day course of treatment with the antibiotic and antifungal 

therapy.  Surgery is allowing him to have a liquid diet he seems to be 

tolerating relatively well


07/31/2018 the patient seems comfortable somewhat sedated.  Is currently 

receiving multiple antimicrobial therapy.  The current data is reviewed and is 

likely to   be transitioned to hospice.  CAN follow as needed.


Current Visit: No   Status: Acute   Code(s): R78.81 - BACTEREMIA   SNOMED Code(s

): 818892467851

## 2018-07-31 NOTE — P.PAINCN
History of Present Illness





- Reason for Consult


Consult date: 18


Intractable pain





- Chief Complaint


Intractable pain





- History of Present Illness





This is a 58 showed gentleman with a history of lymphoma who presents to the 

hospital while being treated with opiates with significant confusion.  His 

opiates were stopped.  The cause of his confusion has been determined to be an 

infection and he is currently being treated with this.  He now has significant 

pain after his 100 g an hour fentanyl patch was discontinued.  He is tired and 

wants to go home





Past Medical History


Past Medical History: Cancer


Additional Past Medical History / Comment(s): 17 abdominal pain/anemia 

with surgical intervention, 10/2017 lymphoma diagnosed/abdominal masses at [facility] 

and pt started chemo there-1 session then 3 more sessions thru [facility]/Dr. Pardo, chemo caused severe abdominal pain and pt was diagnosed with perforated 

intestines d/t abdominal masses shrinkage/pulling on intestine-[facility] stated pt 

not surgical candidate and placed on antibiotics/long complicated 

hospitalization per spouse.


History of Any Multi-Drug Resistant Organisms: None Reported


Past Surgical History: Appendectomy, Orthopedic Surgery


Additional Past Surgical History / Comment(s): 17 laparoscopic partial 

gastrectomy with lysis of adhesions, reduction R inguinal hernia, resection 

gastroenteric fistula, gastrojujunostomy with reconstruction, EGDs/colonoscopy, 

ACL bilateral repairs, several bilateral knee arthroscopies, PICC line inserted 

17.


Past Anesthesia/Blood Transfusion Reactions: No Reported Reaction


Additional Past Anesthesia/Blood Transfusion Reaction / Comm: Pt has received 

blood without reaction.


Past Psychological History: No Psychological Hx Reported


Additional Psychological History / Comment(s):  lives with the wife.  

Has adult children.  Medically disabled.  No  experience.  No 

international travel.  No animals in the home.


Smoking Status: Former smoker


Past Alcohol Use History: None Reported


Additional Past Alcohol Use History / Comment(s): Pt states he was a light 

social smoker but has not smoked any cigarettes since 2017.  Pt states he has 

not had an alcoholic beverage in months, prior he drank more but never daily or 

abused.


Past Drug Use History: None Reported





- Past Family History


  ** Father


Family Medical History: Pneumonia


Additional Family Medical History / Comment(s): Father had anemia.  He  at 

the age of 79yrs from complications after a ingrown toenail was removed and 

then became infected and had to have amputation.





  ** Mother


Family Medical History: Dementia


Additional Family Medical History / Comment(s): Mother  of dementia at the 

age of 82 yrs.





Medications and Allergies


 Home Medications











 Medication  Instructions  Recorded  Confirmed  Type


 


Ergocalciferol [Vitamin D2 50,000 unit PO WE 01/15/18 07/25/18 History





(DRISDOL)]    


 


Insulin Lispro [humaLOG Kwikpen] See Protocol SQ ACHS 18 History


 


LORazepam [Ativan] 1 mg PO Q6H PRN 18 History


 


Omeprazole [PriLOSEC] 20 mg PO DAILY 18 History


 


Prochlorperazine [Compazine] 10 mg PO Q6H PRN 18 History


 


Sertraline [Zoloft] 100 mg PO DAILY 18 History


 


Morphine Sulfate Ir [MSIR] 15 mg PO Q4HR PRN #120 tablet 18 Rx


 


ALPRAZolam [Xanax] 0.25 mg PO DAILY PRN 18 History


 


Docusate [Colace] 100 mg PO DAILY 18 History


 


Dronabinol [Marinol] 2.5 mg PO AC-BID 18 History


 


Fluconazole [Diflucan] 100 mg PO DAILY 18 History


 


fentaNYL 100MCG/HR PATCH 1 patch TRANSDERM Q72H 18 History





[Duragesic 100MCG/HR]    











 Allergies











Allergy/AdvReac Type Severity Reaction Status Date / Time


 


medication for TB exposure Allergy  Unknown Uncoded 18 02:13














Physical Exam


Vitals: 


 Vital Signs











  Temp Pulse Resp BP Pulse Ox


 


 18 05:00  97 F L  104 H  17  127/91  100


 


 18 00:00   107 H  16  


 


 18 23:00  98.4 F  107 H  16  116/83  100


 


 18 15:39   102 H  15  


 


 18 15:01  97.4 F L  102 H  15  135/75  100








 Intake and Output











 18





 22:59 06:59 14:59


 


Intake Total 0 1510 


 


Output Total 600  350


 


Balance -600 1510 -350


 


Intake:   


 


  Intake, IV Titration  1270 





  Amount   


 


    Parenteral Electrolytes  1020 





    20 ml In Amino Acid 5%-   





    D15w 1,000 ml @ 95 mls/hr   





    IV .BY DURATION [facility] Rx#:   





    [id number]   


 


    Vancomycin 1,250 mg In  250 





    Sodium Chloride 0.9% 250   





    ml @ 125 mls/hr IVPB Q12H   





    [facility] Rx#:[id number]   


 


  Oral 0 240 


 


Output:   


 


  Drainage 250  


 


    Lower Abdomen 250  


 


  Urine 350  350


 


Other:   


 


  Voiding Method Toilet Toilet Toilet





 Urinal Urinal Urinal


 


  # Voids 200  


 


  Weight 65.678 kg  











Patient is sleeping.  He does wake easily but sleeps during most of this 

interview.  His spouse provides most of the information.








Results


CBC & Chem 7: 


 18 07:12





 18 07:12


Labs: 


 Abnormal Lab Results - Last 24 Hours (Table)











  18 Range/Units





  18:04 06:44 07:12 


 


WBC    3.2 L  (3.8-10.6)  k/uL


 


RBC    2.99 L  (4.30-5.90)  m/uL


 


Hgb    8.0 L  (13.0-17.5)  gm/dL


 


Hct    26.5 L  (39.0-53.0)  %


 


MCHC    30.2 L  (31.0-37.0)  g/dL


 


RDW    16.0 H  (11.5-15.5)  %


 


Lymphocytes #    0.3 L  (1.0-4.8)  k/uL


 


Sodium     (137-145)  mmol/L


 


BUN     (9-20)  mg/dL


 


Glucose     (74-99)  mg/dL


 


POC Glucose (mg/dL)  109 H  113 H   (75-99)  mg/dL


 


Phosphorus     (2.5-4.5)  mg/dL


 


Alkaline Phosphatase     ()  U/L


 


Total Protein     (6.3-8.2)  g/dL


 


Albumin     (3.5-5.0)  g/dL














  18 Range/Units





  07:12 11:48 


 


WBC    (3.8-10.6)  k/uL


 


RBC    (4.30-5.90)  m/uL


 


Hgb    (13.0-17.5)  gm/dL


 


Hct    (39.0-53.0)  %


 


MCHC    (31.0-37.0)  g/dL


 


RDW    (11.5-15.5)  %


 


Lymphocytes #    (1.0-4.8)  k/uL


 


Sodium  133 L   (137-145)  mmol/L


 


BUN  28 H   (9-20)  mg/dL


 


Glucose  104 H   (74-99)  mg/dL


 


POC Glucose (mg/dL)   112 H  (75-99)  mg/dL


 


Phosphorus  2.4 L   (2.5-4.5)  mg/dL


 


Alkaline Phosphatase  144 H   ()  U/L


 


Total Protein  5.5 L   (6.3-8.2)  g/dL


 


Albumin  3.2 L   (3.5-5.0)  g/dL








 Microbiology - Last 24 Hours (Table)











 18 12:45 Anaerobic Culture - Final





 Abdomen 














Assessment and Plan


Plan: 


We'll restart the patient's endo-patch at 50 migraines an hour.  He was 

previously on 75 g an hour and then 100 mg an hour.  This is a good starting 

place.  We can continue to titrate medications patient on his response.








PQRS Measure Charge Sheet


Measure #130: Documentation of Current Meds in Medical Chart: Patient's 

medications documented in chart


PQRS Narrative: 


 





Smoking Status                   Former smoker


Blood Pressure [Left Arm]        127/91


Blood Pressure                   152/81


Pain Intensity [None]            6


Pain Intensity [Abdomen]         6


Pain Intensity [Back]            0


Pain Intensity                   10


Pain Scale Used                  Numeric (1 - 10)


Scale Used                       Numeric (1 - 10)








Home Medications: 


Ambulatory Orders





Ergocalciferol [Vitamin D2 (DRISDOL)] 50,000 unit PO WE 01/15/18 


Insulin Lispro [humaLOG Kwikpen] See Protocol SQ ACHS 18 


LORazepam [Ativan] 1 mg PO Q6H PRN 18 


Omeprazole [PriLOSEC] 20 mg PO DAILY 18 


Prochlorperazine [Compazine] 10 mg PO Q6H PRN 18 


Sertraline [Zoloft] 100 mg PO DAILY 18 


Morphine Sulfate Ir [MSIR] 15 mg PO Q4HR PRN #120 tablet 18 


ALPRAZolam [Xanax] 0.25 mg PO DAILY PRN 18 


Docusate [Colace] 100 mg PO DAILY 18 


Dronabinol [Marinol] 2.5 mg PO AC-BID 18 


Fluconazole [Diflucan] 100 mg PO DAILY 18 


fentaNYL 100MCG/HR PATCH [Duragesic 100MCG/HR] 1 patch TRANSDERM Q72H 18

## 2018-08-01 VITALS — DIASTOLIC BLOOD PRESSURE: 62 MMHG | HEART RATE: 101 BPM | TEMPERATURE: 96.9 F | SYSTOLIC BLOOD PRESSURE: 97 MMHG

## 2018-08-01 VITALS — RESPIRATION RATE: 16 BRPM

## 2018-08-01 LAB
ANION GAP SERPL CALC-SCNC: 9 MMOL/L
BUN SERPL-SCNC: 33 MG/DL (ref 9–20)
CALCIUM SPEC-MCNC: 9.4 MG/DL (ref 8.4–10.2)
CHLORIDE SERPL-SCNC: 95 MMOL/L (ref 98–107)
CO2 SERPL-SCNC: 27 MMOL/L (ref 22–30)
GLUCOSE BLD-MCNC: 116 MG/DL (ref 75–99)
GLUCOSE BLD-MCNC: 121 MG/DL (ref 75–99)
GLUCOSE SERPL-MCNC: 111 MG/DL (ref 74–99)
MAGNESIUM SPEC-SCNC: 2.3 MG/DL (ref 1.6–2.3)
POTASSIUM SERPL-SCNC: 4.5 MMOL/L (ref 3.5–5.1)
SODIUM SERPL-SCNC: 131 MMOL/L (ref 137–145)

## 2018-08-01 RX ADMIN — MORPHINE SULFATE PRN MG: 4 INJECTION, SOLUTION INTRAMUSCULAR; INTRAVENOUS at 14:38

## 2018-08-01 RX ADMIN — MORPHINE SULFATE PRN MG: 4 INJECTION, SOLUTION INTRAMUSCULAR; INTRAVENOUS at 11:19

## 2018-08-01 RX ADMIN — ONDANSETRON PRN MG: 2 INJECTION INTRAMUSCULAR; INTRAVENOUS at 01:15

## 2018-08-01 RX ADMIN — MORPHINE SULFATE PRN MG: 4 INJECTION, SOLUTION INTRAMUSCULAR; INTRAVENOUS at 04:48

## 2018-08-01 RX ADMIN — SODIUM PHOSPHATE, MONOBASIC, MONOHYDRATE SCH MLS/HR: 276; 142 INJECTION, SOLUTION INTRAVENOUS at 08:37

## 2018-08-01 RX ADMIN — MORPHINE SULFATE PRN MG: 4 INJECTION, SOLUTION INTRAMUSCULAR; INTRAVENOUS at 08:09

## 2018-08-01 RX ADMIN — MORPHINE SULFATE PRN MG: 4 INJECTION, SOLUTION INTRAMUSCULAR; INTRAVENOUS at 01:44

## 2018-08-01 NOTE — PN
PROGRESS NOTE



DATE OF SERVICE:

07/31/2018



PRESENTING COMPLAINT:

Abdominal pain.



INTERVAL HISTORY:

Patient has advanced abdominal lymphoma, has a biliary drain in place. Last night  Dr. Pardo spoke to the wife and they have agreed to proceed with hospice. [facility] was involved, are working on the case.  The patient tolerating a full liquid

diet.  No bowel movement.  Also being getting TPN lipids.



REVIEW OF SYSTEMS:

Review of systems done for constitutional, cardiovascular, GI, pulmonary; relevant

findings as above.



CURRENT MEDICATIONS:

Current medications are reviewed include IV meropenem, vancomycin. Duragesic patch was

added by the pain team and also getting TPN lipids.



PHYSICAL EXAMINATION:

On examination, temperature 97.5 pulse 97, respiration 18, blood pressure 111/71, pulse

ox 100% on room air.

GENERAL APPEARANCE: Sitting up, tired-appearing.

EYES: Pupils equal. Conjunctivae pale.

HENT: External appearance of nose and ears normal.  Oral cavity normal.

NECK: JVD not raised.  Mass not palpable.

RESPIRATORY: Effort normal.

LUNGS: Decreased breath sounds.

CARDIOVASCULAR: First and second sounds normal. No edema.

ABDOMEN: Tender, soft, biliary drain in place.  No guarding or rigidity.

PSYCHIATRY: Alert and orient x3, anxious appearing.



INVESTIGATIONS:

White count 3.2, hemoglobin 8. Potassium 4.5. BUN 28, creatinine 0.71.



ASSESSMENT:

1. Non-Hodgkin's lymphoma stage IV advanced causing increasing persistent  abdominal

    pain uncontrolled.

2. Acute on chronic abdominal pain not controlled, now Duragesic patch was added by

    pain management team.

3. Normocytic anemia from underlying lymphoma.

4. Mild protein-calorie malnutrition from malignancy.

5. Metabolic encephalopathy from pain medication fluctuating.



PLAN:

I had a lengthy talk with the patient, patient's wife and some of the family members

present. [facility] is already involved looking to take the patient home per

his choice.  I did talk to the patient and wife.  There is no further role for

nutrition at this point except for what patient may like to eat for pleasure.  He

understands the same.  I did explain to him that the pain medication will be given as

per the pain management team and hospice medications. Several questions were answered.

Hospice bed is being arranged at home.



Total time spent today was about 45 minutes with over 20 to 25 minutes of discussion.



In view of above the TPN lipids and antibiotics will be discontinued.





MMODL / IJN: [id number] / Job#: [id number]

## 2018-08-01 NOTE — DS
DISCHARGE SUMMARY



DATE OF ADMISSION:

07/25/2018



DATE OF DISCHARGE:

08/01/2018



FINAL DIAGNOSES:

1. Advancing non-Hodgkin's lymphoma, stage IV, causing worsening uncontrolled

    abdominal pain.

2. Normocytic anemia from underlying lymphoma.

3. Mild protein-calorie malnutrition from malignancy.

4. Metabolic encephalopathy from pain medications.



CONSULTATIONS:

1. Dr. Louise from Infectious Disease.

2. Dr. Robison from Oncology.

3. Dr. Osborn from Cardiology.

4. Dr. Pittman from General Surgery.

5. Pain Management Service.



HOSPITAL COURSE:

This pleasant gentleman who has rather extensive non-Hodgkin's lymphoma has had

previous abdominal surgeries and got a biliary drain. He presented with worsening

abdominal pain.  The patient's course kept fluctuating. Pain remained uncontrolled.

Dr. Pardo finally talked to the patient and his wife; he felt nothing much further

could be done, and the patient is being made hospice. Today I talked to the patient and

his wife and answered several questions.  No artificial feeding. The biliary drain will

remain in place for comfort measures.  Pain medications were discussed.  The patient

will be going home.  The patient's family chose [facility].



On examination, abdominal tenderness.  No guarding or rigidity.  Biliary drain in

place.  Patient is awake, able to answer questions.



DISCHARGE MEDICATIONS:

1. Ativan 1 mg q.6 p.r.n. for anxiety.

2. Prilosec 20 mg a day.

3. Compazine 10 mg q.6 p.r.n.

4. Zoloft 100 mg a day.

5. Morphine sulfate 50 mg q.4 p.r.n.

6. Fentanyl patch 100 mcg/hour every 72 hours.

7. Ativan as before.

8. Roxanol 20 mg/mL _____ mg q.4 p.r.n.

9. Scopolamine patch q.72 hours p.r.n. for secretions.



DISPOSITION:

Home with hospice.



Discussion and discharge planning more than 35 minutes.



Follow up with Dr. Rock saul [facility].





MMODL / IJN: [id number] / Job#: [id number]

## 2018-08-05 ENCOUNTER — HOSPITAL ENCOUNTER (EMERGENCY)
Dept: HOSPITAL 47 - EC | Age: 58
LOS: 1 days | Discharge: HOME | End: 2018-08-06
Payer: COMMERCIAL

## 2018-08-05 VITALS
HEART RATE: 118 BPM | TEMPERATURE: 97.5 F | RESPIRATION RATE: 20 BRPM | SYSTOLIC BLOOD PRESSURE: 100 MMHG | DIASTOLIC BLOOD PRESSURE: 75 MMHG

## 2018-08-05 DIAGNOSIS — Z90.49: ICD-10-CM

## 2018-08-05 DIAGNOSIS — Z48.03: Primary | ICD-10-CM

## 2018-08-05 DIAGNOSIS — Z88.8: ICD-10-CM

## 2018-08-05 DIAGNOSIS — Z87.891: ICD-10-CM

## 2018-08-05 DIAGNOSIS — Z93.4: ICD-10-CM

## 2018-08-05 DIAGNOSIS — Z79.891: ICD-10-CM

## 2018-08-05 DIAGNOSIS — Z79.899: ICD-10-CM

## 2018-08-05 DIAGNOSIS — Z85.72: ICD-10-CM

## 2018-08-05 DIAGNOSIS — Z92.21: ICD-10-CM

## 2018-08-05 DIAGNOSIS — Z90.3: ICD-10-CM

## 2018-08-05 PROCEDURE — 99282 EMERGENCY DEPT VISIT SF MDM: CPT

## 2018-08-06 NOTE — ED
Recheck HPI





- General


Chief Complaint: Recheck/Abnormal Lab/Rx


Stated Complaint: tubing issue


Time Seen by Provider: 18 23:32


Source: patient, family


Mode of arrival: wheelchair


Limitations: no limitations





- History of Present Illness


Initial Comments: 





This patient is a 58-year-old man with advanced lymphoma who is on hospice care 

as of .  The patient states that tonight he was extending his left leg 

and his bile drainage tube caught on and was pulled out.  He denies any pain or 

injury at the insertion site.  No bleeding area there is a small amount of bile 

draining.  Patient furthermore states that he was told by his oncologist that 

they probably should take the tube out anyway.  He states that if it were up to 

him he would leave it out.  He is not having any symptoms related to this right 

now.


Onset/Timin


-: hour(s)


Initial Visit For: other (Tube malfunction)


Symptoms Since Prior Visit: no new symptoms


Associated Symptoms: none





- Related Data


 Home Medications











 Medication  Instructions  Recorded  Confirmed


 


LORazepam [Ativan] 1 mg PO Q6H PRN 18


 


Omeprazole [PriLOSEC] 20 mg PO DAILY 18


 


Prochlorperazine [Compazine] 10 mg PO Q6H PRN 18


 


Sertraline [Zoloft] 100 mg PO DAILY 18


 


fentaNYL 100MCG/HR PATCH 1 patch TRANSDERM Q72H 18





[Duragesic 100MCG/HR]   








 Previous Rx's











 Medication  Instructions  Recorded


 


Morphine Sulfate Ir [MSIR] 15 mg PO Q4HR PRN #120 tablet 18


 


LORazepam [Ativan] 1 mg PO Q4H PRN #30 tablet 18


 


MORPHINE ORAL SOL CONC 20mg/mL 5 mg PO Q4H PRN #30 ml 18





[Roxanol Oral Soln Conc 20Mg/ml]  


 


Scopolamine 1.5MG/72Hr Patch 1 patch TRANSDERM Q72H PRN #10 18





[TransDerm Scop] patch 











 Allergies











Allergy/AdvReac Type Severity Reaction Status Date / Time


 


medication for TB exposure Allergy  Unknown Uncoded 18 23:25














Review of Systems


ROS Statement: 


Those systems with pertinent positive or pertinent negative responses have been 

documented in the HPI.





ROS Other: All systems not noted in ROS Statement are negative.


Constitutional: Denies: fever, chills


Respiratory: Denies: cough, dyspnea


Cardiovascular: Denies: chest pain


Gastrointestinal: Denies: abdominal pain, nausea, vomiting


Skin: Denies: rash





Past Medical History


Past Medical History: Cancer


Additional Past Medical History / Comment(s): 17 abdominal pain/anemia 

with surgical intervention, 10/2017 lymphoma diagnosed/abdominal masses at [facility] 

and pt started chemo there-1 session then 3 more sessions thru [facility]/Dr. Pardo, chemo caused severe abdominal pain and pt was diagnosed with perforated 

intestines d/t abdominal masses shrinkage/pulling on intestine-[facility] stated pt 

not surgical candidate and placed on antibiotics/long complicated 

hospitalization per spouse.


History of Any Multi-Drug Resistant Organisms: None Reported


Past Surgical History: Appendectomy, Orthopedic Surgery


Additional Past Surgical History / Comment(s): 17 laparoscopic partial 

gastrectomy with lysis of adhesions, reduction R inguinal hernia, resection 

gastroenteric fistula, gastrojujunostomy with reconstruction, EGDs/colonoscopy, 

ACL bilateral repairs, several bilateral knee arthroscopies, PICC line inserted 

17.


Past Anesthesia/Blood Transfusion Reactions: No Reported Reaction


Additional Past Anesthesia/Blood Transfusion Reaction / Comment(s): Pt has 

received blood without reaction.


Past Psychological History: No Psychological Hx Reported


Smoking Status: Former smoker


Past Alcohol Use History: None Reported


Past Drug Use History: None Reported





- Past Family History


  ** Father


Family Medical History: Pneumonia


Additional Family Medical History / Comment(s): Father had anemia.  He  at 

the age of 79yrs from complications after a ingrown toenail was removed and 

then became infected and had to have amputation.





  ** Mother


Family Medical History: Dementia


Additional Family Medical History / Comment(s): Mother  of dementia at the 

age of 82 yrs.





General Exam


Limitations: no limitations


General appearance: alert, in no apparent distress


Respiratory exam: Present: normal lung sounds bilaterally


Cardiovascular Exam: Present: regular rate, normal rhythm


GI/Abdominal exam: Present: soft, other (Patient has ostomy site without 

evidence of infection.  No tenderness.).  Absent: distended, tenderness, 

guarding, rebound





Course


 Vital Signs











  18





  23:21


 


Temperature 97.5 F L


 


Pulse Rate 118 H


 


Respiratory 20





Rate 


 


Blood Pressure 100/75


 


O2 Sat by Pulse 99





Oximetry 














Medical Decision Making





- Medical Decision Making





Discussed with patient that if he would like to replace he probably should have 

this done at [facility] where the initial one was placed.  He states 

that it is his preference to have the tube out anyways.  I would discuss that 

should symptoms develop he may need to have it replaced.  Patient understands 

and will follow with his physician.  I did also discuss with Dr. Grijalva who is 

the surgeon on-call and he had also recommended [facility] should there be any 

symptoms or the tube needed to be replaced





Disposition


Clinical Impression: 


 Encounter for biliary drainage tube placement





Disposition: HOME SELF-CARE


Condition: Poor


Instructions:  Acute Wound Care (ED)


Is patient prescribed a controlled substance at d/c from ED?: No


Referrals: 


Ibrahima Wilkerson DO [Primary Care Provider] - 1-2 days 20